# Patient Record
Sex: OTHER/UNKNOWN | Race: WHITE | NOT HISPANIC OR LATINO | Employment: FULL TIME | URBAN - METROPOLITAN AREA
[De-identification: names, ages, dates, MRNs, and addresses within clinical notes are randomized per-mention and may not be internally consistent; named-entity substitution may affect disease eponyms.]

---

## 2022-05-13 RX ORDER — DEXTROAMPHETAMINE SACCHARATE, AMPHETAMINE ASPARTATE, DEXTROAMPHETAMINE SULFATE AND AMPHETAMINE SULFATE 1.25; 1.25; 1.25; 1.25 MG/1; MG/1; MG/1; MG/1
1 TABLET ORAL DAILY
COMMUNITY
Start: 2022-05-05 | End: 2022-07-12

## 2022-05-13 RX ORDER — QUETIAPINE FUMARATE 50 MG/1
50 TABLET, FILM COATED ORAL EVERY EVENING
COMMUNITY
Start: 2022-05-03 | End: 2022-07-12

## 2022-11-15 DIAGNOSIS — F41.1 GENERALIZED ANXIETY DISORDER: ICD-10-CM

## 2022-11-15 DIAGNOSIS — F31.81 BIPOLAR II DISORDER (HCC): ICD-10-CM

## 2022-11-15 RX ORDER — VENLAFAXINE HYDROCHLORIDE 37.5 MG/1
CAPSULE, EXTENDED RELEASE ORAL
Qty: 20 CAPSULE | Refills: 0 | Status: SHIPPED | OUTPATIENT
Start: 2022-11-15 | End: 2022-12-19 | Stop reason: SDUPTHER

## 2023-07-22 DIAGNOSIS — I10 ESSENTIAL HYPERTENSION: ICD-10-CM

## 2023-07-24 RX ORDER — LABETALOL 200 MG/1
200 TABLET, FILM COATED ORAL 2 TIMES DAILY
Qty: 180 TABLET | Refills: 0 | Status: SHIPPED | OUTPATIENT
Start: 2023-07-24

## 2023-07-31 ENCOUNTER — APPOINTMENT (OUTPATIENT)
Dept: LAB | Facility: CLINIC | Age: 33
End: 2023-07-31
Payer: MEDICARE

## 2023-07-31 DIAGNOSIS — E66.01 MORBID OBESITY (HCC): ICD-10-CM

## 2023-07-31 DIAGNOSIS — R79.89 ELEVATED LFTS: ICD-10-CM

## 2023-07-31 DIAGNOSIS — E78.2 MIXED HYPERLIPIDEMIA: ICD-10-CM

## 2023-07-31 LAB
ALBUMIN SERPL BCP-MCNC: 4 G/DL (ref 3.5–5)
ALP SERPL-CCNC: 34 U/L (ref 46–116)
ALT SERPL W P-5'-P-CCNC: 45 U/L (ref 12–78)
AST SERPL W P-5'-P-CCNC: 17 U/L (ref 5–45)
BILIRUB DIRECT SERPL-MCNC: 0.07 MG/DL (ref 0–0.2)
BILIRUB SERPL-MCNC: 0.26 MG/DL (ref 0.2–1)
CHOLEST SERPL-MCNC: 215 MG/DL
HDLC SERPL-MCNC: 36 MG/DL
LDLC SERPL CALC-MCNC: 116 MG/DL (ref 0–100)
NONHDLC SERPL-MCNC: 179 MG/DL
PROT SERPL-MCNC: 7.6 G/DL (ref 6.4–8.4)
TRIGL SERPL-MCNC: 317 MG/DL

## 2023-07-31 PROCEDURE — 80061 LIPID PANEL: CPT

## 2023-07-31 PROCEDURE — 80076 HEPATIC FUNCTION PANEL: CPT

## 2023-07-31 PROCEDURE — 36415 COLL VENOUS BLD VENIPUNCTURE: CPT

## 2023-08-03 ENCOUNTER — OFFICE VISIT (OUTPATIENT)
Dept: FAMILY MEDICINE CLINIC | Facility: CLINIC | Age: 33
End: 2023-08-03
Payer: MEDICARE

## 2023-08-03 VITALS
HEART RATE: 92 BPM | SYSTOLIC BLOOD PRESSURE: 120 MMHG | BODY MASS INDEX: 39.16 KG/M2 | OXYGEN SATURATION: 99 % | DIASTOLIC BLOOD PRESSURE: 80 MMHG | HEIGHT: 63 IN | WEIGHT: 221 LBS

## 2023-08-03 DIAGNOSIS — E78.1 HIGH TRIGLYCERIDES: ICD-10-CM

## 2023-08-03 DIAGNOSIS — E78.2 MIXED HYPERLIPIDEMIA: Primary | ICD-10-CM

## 2023-08-03 DIAGNOSIS — E78.1 HYPERTRIGLYCERIDEMIA: ICD-10-CM

## 2023-08-03 DIAGNOSIS — Z79.899 MEDICATION MANAGEMENT: ICD-10-CM

## 2023-08-03 DIAGNOSIS — R73.01 ELEVATED FASTING BLOOD SUGAR: ICD-10-CM

## 2023-08-03 PROCEDURE — 99214 OFFICE O/P EST MOD 30 MIN: CPT | Performed by: FAMILY MEDICINE

## 2023-08-03 RX ORDER — FENOFIBRATE 160 MG/1
160 TABLET ORAL DAILY
Qty: 90 TABLET | Refills: 1 | Status: SHIPPED | OUTPATIENT
Start: 2023-08-03

## 2023-08-03 NOTE — PROGRESS NOTES
Outpatient Note- Follow up     HPI:     Donald Reyna , 27 y.o. [facility]  presents today for follow-up of chronic conditions. The patient recently obtained labs that I personally reviewed with her today. Her liver function tests have improved after reducing her cholesterol and triglycerides. This is reassuring for her she likely had a history of fatty liver disease. Her cholesterol values have also reduced over time. Her LDL and total cholesterol have come down mildly, her triglycerides after starting Tricor and increasing to 108 mg significantly reduced to the 300s from 500s. She has been tolerating the medication well and takes it at night to prevent issues with flushing. Mental health wise the patient continues to follow with her psychiatric providers. She is currently on Effexor, Seroquel, and Adderall. She is also on Junel, this is to avoid any complications of pregnancy with the medication she is currently on. We reviewed the important symptoms to watch for including lower extremity swelling, redness, pain, or respiratory symptoms of acute chest pain and pleuritic pain. Lastly we reviewed the patient's blood pressure. Stable on presentation today at 120/80. She is remained on labetalol after we discussed possible pregnancy previously. We have not changed it since she will likely need to be on this medication after birth control is discontinued. Past Medical History:   Diagnosis Date   • Anxiety    • Asthma    • Cervical cyst    • Depression    • Hypertension    • Ovarian cyst    • Ovarian cyst     left      ROS:   Review of Systems   See HPI    OBJECTIVE  Vitals:    08/03/23 1326   BP: 120/80   Pulse: 92   SpO2: 99%        Physical Exam  Constitutional:       General: Donald Reyna is not in acute distress. Appearance: Normal appearance. Donald Reyna is obese. Donald Reyna is not ill-appearing, toxic-appearing or diaphoretic. HENT:      Head: Normocephalic and atraumatic. Right Ear: Tympanic membrane, ear canal and external ear normal. There is no impacted cerumen. Left Ear: Tympanic membrane, ear canal and external ear normal. There is no impacted cerumen. Nose: Nose normal. No congestion or rhinorrhea. Mouth/Throat:      Mouth: Mucous membranes are moist.      Pharynx: Oropharynx is clear. No oropharyngeal exudate or posterior oropharyngeal erythema. Eyes:      Pupils: Pupils are equal, round, and reactive to light. Cardiovascular:      Rate and Rhythm: Normal rate and regular rhythm. Heart sounds: Normal heart sounds. No murmur heard. No friction rub. No gallop. Pulmonary:      Effort: Pulmonary effort is normal. No respiratory distress. Breath sounds: Normal breath sounds. No stridor. No wheezing, rhonchi or rales. Abdominal:      General: Bowel sounds are normal. There is no distension. Palpations: Abdomen is soft. Tenderness: There is no abdominal tenderness. Skin:     General: Skin is warm. Capillary Refill: Capillary refill takes less than 2 seconds. Neurological:      Mental Status: Donald Reyna is alert. ASSESSMENT AND PLAN   Rica Daniels was seen today for follow-up. Diagnoses and all orders for this visit:    Mixed hyperlipidemia  Hypertriglyceridemia  Medication management  Patient following up for continued elevations and LDL, total cholesterol, and triglycerides. She has done a great job with losing about 8 pounds over the last several months. She has been changing her diet to include fish and leaner meats such as chicken and turkey. She is looking to start a pescatarian diet. I did review the Mediterranean diet and its benefits with cardiovascular health. After review of her labs, her triglycerides are improved, but not optimal.  Will increase dose of fenofibrate from 108 to 160 mg.   We will follow-up with lipid panel and hepatic function panel in about 3 months to determine if there is been a significant improvement. -     fenofibrate 160 MG tablet; Take 1 tablet (160 mg total) by mouth daily  -     Lipid panel; Future  -     Hepatic function panel; Future    Elevated fasting blood sugar  Patient has history of elevated fasting blood sugar. Will obtain a hemoglobin A1c for follow-up sugar evaluation. Her last fasting sugar was 108 in April. Since she has been dieting, the patient is interested in following up this value after she has been attempting to lose weight.  -     HEMOGLOBIN A1C W/ EAG ESTIMATION; Future  -     Hepatic function panel;  Future      Vilma Mckeon DO  [facility]  8/3/2023 1:59 PM

## 2023-08-05 ENCOUNTER — PATIENT MESSAGE (OUTPATIENT)
Dept: FAMILY MEDICINE CLINIC | Facility: CLINIC | Age: 33
End: 2023-08-05

## 2023-08-05 DIAGNOSIS — Z32.00 POSSIBLE PREGNANCY: Primary | ICD-10-CM

## 2023-08-07 ENCOUNTER — APPOINTMENT (OUTPATIENT)
Dept: LAB | Facility: CLINIC | Age: 33
End: 2023-08-07
Payer: MEDICARE

## 2023-08-07 DIAGNOSIS — Z32.00 POSSIBLE PREGNANCY: Primary | ICD-10-CM

## 2023-08-07 DIAGNOSIS — Z32.00 POSSIBLE PREGNANCY: ICD-10-CM

## 2023-08-07 LAB — B-HCG SERPL-ACNC: <1 MIU/ML (ref 0–0.6)

## 2023-08-07 PROCEDURE — 36415 COLL VENOUS BLD VENIPUNCTURE: CPT

## 2023-08-07 PROCEDURE — 84702 CHORIONIC GONADOTROPIN TEST: CPT

## 2023-08-07 NOTE — PROGRESS NOTES
Order placed to confirm negative pregnancy. I did inform the patient to review with OBGYN, but I believe this to be negative. Confirmatory testing is due to possible medication restart.      Ho Browne DO  [facility]  8/7/2023 6:15 PM

## 2023-08-09 DIAGNOSIS — F41.1 GENERALIZED ANXIETY DISORDER: ICD-10-CM

## 2023-08-09 DIAGNOSIS — F31.81 BIPOLAR II DISORDER (HCC): ICD-10-CM

## 2023-08-09 DIAGNOSIS — K21.9 GASTRO-ESOPHAGEAL REFLUX DISEASE WITHOUT ESOPHAGITIS: ICD-10-CM

## 2023-08-09 RX ORDER — OMEPRAZOLE 20 MG/1
CAPSULE, DELAYED RELEASE ORAL
Qty: 30 CAPSULE | Refills: 0 | Status: SHIPPED | OUTPATIENT
Start: 2023-08-09

## 2023-08-10 RX ORDER — VENLAFAXINE HYDROCHLORIDE 37.5 MG/1
CAPSULE, EXTENDED RELEASE ORAL
Qty: 90 CAPSULE | Refills: 0 | Status: SHIPPED | OUTPATIENT
Start: 2023-08-10 | End: 2023-08-24

## 2023-08-10 NOTE — TELEPHONE ENCOUNTER
Patient requested refill of :  Requested Prescriptions     Pending Prescriptions Disp Refills   • venlafaxine (EFFEXOR-XR) 37.5 mg 24 hr capsule [Pharmacy Med Name: Venlafaxine HCl ER 37.5 MG Oral Capsule Extended Release 24 Hour] 90 capsule 0     Sig: TAKE 3 CAPSULES BY MOUTH ONCE DAILY         Refill request approved and sent to pharmacy on file per patient request. Patient is a transfer of care to a new resident physician starting 8/24/2023.      Willie Bolton MD

## 2023-08-24 ENCOUNTER — OFFICE VISIT (OUTPATIENT)
Dept: PSYCHIATRY | Facility: CLINIC | Age: 33
End: 2023-08-24
Payer: COMMERCIAL

## 2023-08-24 DIAGNOSIS — F41.9 ANXIETY: ICD-10-CM

## 2023-08-24 DIAGNOSIS — F90.2 ATTENTION DEFICIT HYPERACTIVITY DISORDER (ADHD), COMBINED TYPE: ICD-10-CM

## 2023-08-24 DIAGNOSIS — F41.1 GENERALIZED ANXIETY DISORDER: ICD-10-CM

## 2023-08-24 DIAGNOSIS — F31.81 BIPOLAR II DISORDER (HCC): ICD-10-CM

## 2023-08-24 DIAGNOSIS — F32.1 CURRENT MODERATE EPISODE OF MAJOR DEPRESSIVE DISORDER WITHOUT PRIOR EPISODE (HCC): Primary | ICD-10-CM

## 2023-08-24 DIAGNOSIS — G47.00 INSOMNIA, UNSPECIFIED TYPE: ICD-10-CM

## 2023-08-24 PROCEDURE — 90792 PSYCH DIAG EVAL W/MED SRVCS: CPT | Performed by: STUDENT IN AN ORGANIZED HEALTH CARE EDUCATION/TRAINING PROGRAM

## 2023-08-24 RX ORDER — VENLAFAXINE HYDROCHLORIDE 150 MG/1
150 CAPSULE, EXTENDED RELEASE ORAL DAILY
Qty: 30 CAPSULE | Refills: 1 | Status: SHIPPED | OUTPATIENT
Start: 2023-08-24 | End: 2023-09-23

## 2023-08-24 RX ORDER — QUETIAPINE FUMARATE 50 MG/1
50 TABLET, FILM COATED ORAL
Qty: 30 TABLET | Refills: 2 | Status: SHIPPED | OUTPATIENT
Start: 2023-08-24 | End: 2023-09-23

## 2023-08-24 NOTE — PATIENT INSTRUCTIONS
The MIND diet is a variation and combination of a healthy Mediterranean and DASH diet with scientific evidence to support neurological and mental health. Thus it is an excellent healthy eating pattern for many people with neurological disorders such as peripheral neuropathy, nerve injuries such as Bell's Palsy,  cerebrovascular accidents (strokes), cognitive impairment, early dementia. It geovani also be helpful in mental health issues such as depression, anxiety, neurodevelopmental disorders such as ADHD, Autism spectrum. Your doctor and/or nutritionist can individualize the  MIND diet to meet your specific clinical situation. Note that if your are diabetic and you are on blood sugar lowering medications such as insulin or sulfonylureas (like glipizide, glimepiride, etc), this diet is so effective at improving and even reversing diabetes that your need for medication may go way down. To avoid very low blood sugar (hypoglycemia),  be sure to monitor your blood sugar very closely and adjust down your medications if needed under the guidance of your physician. https://imagesvc.Wiser (formerly WisePricer).io/v3/mm/image?url=https%3A%2F%2Fstatic.ones. io%2Fwp-content%2Fuploads%2Fsites%2F12%1R2122%2F02%2Flentil-arugula-avocado-salad-2000.jpg    Here are the key aspects of the MIND diet:    Eat whole, unprocessed food, mainly plants. 'Eat the rainbow' - eat different color veggies to get the full range of their nutrients. Vegetables should be about 1/2 of the plate, 1/4 of plate a (mainly plant based) protein source such as tofu), 1/4 of plate healthy starch or carb such as a whole grain or root vegetable. Eat less animal products, and choose healthy sources, such as healthy fish (e.g. sardines, mackerel, herring, flounder, high quality salmon). No more than the size of a pack of cards of animal products per day. Avoid all industrially produced (feedlot) red meat and poultry.      Eat plenty of these key foods that support brain, nerve and mental health:  Avocados  Beans  Blueberries and other berries  Broccoli, Kale and other leafy greens are key: have a minimum of 1 serving a day in addition to other veggies  Extra-virgin olive oil  Flax seed (grind just prior to eating and toss on breakfast cereal, in salads). Don't heat it because the omega-3 fatty acids are very delicate. Herbal teas: hibiscus, lemon balm, mint, etc.  Fresh herbs & spices like cilantro, dill, rosemary, thyme, oregano, basil, mint, parsley; cinnamon, oregano, marjoram, allspice, saffron and others. Turmeric is especially anti-inflammatory - adding a small amount of black pepper will help its absorption. Many herbs and spices are rich in anti-oxidants  Nuts & seeds, e.g. almonds, walnuts, melinda seeds, pumpkin, sunflower seeds. A handful a day will give you essential fatty acids and minerals like magnesium. Whole grains: oats, buckwheat, millet, teff, sorghum, amaranth. Wenda Cardinal is the only grain that is a complete protein source and therefore is a great staple grain. Whole wheat in moderation is ok. AVOID all white flour products, as they are often largely empty calories deficient in micronutrients and are high in glycemic index that induce inflammation and over-eating. Sweet potatoes (yams) are preferred over white potatoes. AVOID or at least minimize all these:  Processed foods: chips, cookies, frozen dinners, white bread, bagels, pastries, sweets and similar bakery products   Fast food, fried foods. Avoid all fast food restaurants like [employer], [employer], [employer], [employer], [facility] take-out and similar cheap unhealthy food establishments  Processed and industrially produced meats: sutton, salami, pastrami, hot dogs, luncheon meats are filled with saturated fat and sodium that induce nerve and brain inflammation and damage. Occasional free range poultry or grass fed bison or wild game meat like venison (e.g. once or twice a week, 6 oz.  - the size of a pack of cards) is probably ok. Do not eat regular chicken, which is the main source of cholesterol in the standard American diet and a major contributor to obesity and nerve/brain inflammation. Butter or margarine. These are high in saturated or dangerous trans fats  Cheese is high in saturated fat. Use not at all or minimally, such as a little sprinkle of a highly flavored cheese on food. Don't eat any food in which cheese is a main ingredient. Sugary drinks such as soda. Also avoid artificially sweetened sodas, which confuse our energy balancing mechanism and disturb our gut microbiome  Excess alcohol. Don't drink at all if there is a contraindication to drinking such as alcohol use disorder, liver disease, etc., Men should never exceed two drinks per day, women no more than 1 drink per day. A drink = one small glass of wine, one 12 oz. Beer or one shot of hard liquor. Here are some resources to learn more about the MIND diet and improving brain and nerve health: The Mind diet cookbook 2021 by Yasmin Estes. Brief intro then a year's work of nice recipes   The Alzheimer's Solution by Nichole Russo and Pati Carreon MD. Copyright 2017. Reviews healthy lifestyle as relates to brain health. Can use to help prevent/support healing of many neurological and mental health disorders, not just dementia. Has nice recipes in back of book. How not to Die by Aleksey Rosenthal MD Copyright 3945. Evidence based nutrition to help prevent and support healing of many diseases. Has a  Cookbook. Useful web site: NutritionFacts. org      Smoothie for brain & nerve health (could have one a day):  Place in :   About 1 cup or more of water, milk, almond or soy milk, adjust for proper consistency  1 cup fresh kale or spinach or other dark leafy green  1 cup berries such as blueberries, blackberries or raspberries  1 very ripe banana or other fruit like jose alberto, or other fruit of your choice  1 - 2 tablespoons of freshly ground flaxseed  1/4 cup nuts such as walnuts, almonds, hazelnuts, etc.     Can also add per your preference:  Freshly ground Manny or other seeds  Various spices: a dash of nutmeg, 1/2 tsp or so of turmeric, cinnamon. cardamom, coriander, etc.   Flavors such as vanilla, cocoa powder (with a little sugar, like 1 teaspoon for palatability if needed)    If you have diabetes, add 1/2 tsp of cinnamon and sip slowly over 1 hour to avoid a sugar rush. Have fun experimenting and adjusting to your taste! The MIND diet should be part of a comprehensive program to help support healing from your medical condition. Be sure this is under the guidance of your health care provider. Please call the office nursing staff for medication issues including refills, problems obtaining medications, side effects, etc at [phone]. Please return for a follow up appointment as discussed. If you are running late or are unable to attend your appointment, please call our [place] office at [phone]. If you are in psychological crisis including not limited to suicidal/homicidal thoughts or thoughts of self-injury, do not hesitate to call/contact [phone] which is the national mental health emergency line (like 911 but for mental health), your [facility] hotline (see below), or go to the nearest emergency department.   [facility]: [address] Crisis: [phone]  [address] Crisis: [address] Crisis: [address] Crisis: [address] Crisis: [address] [facility] Crisis: [phone]  National Suicide Prevention Hotline: 5-254.444.9137

## 2023-08-24 NOTE — BH TREATMENT PLAN
TREATMENT PLAN (Medication Management Only)        [address] [facility]    Name and Date of Birth:  Caden Wagner 27 y.o. 1990  Date of Treatment Plan: August 24, 2023  Diagnosis/Diagnoses:    1. Current moderate episode of major depressive disorder without prior episode ([address])    2. Anxiety    3. Bipolar II disorder ([address])    4. Generalized anxiety disorder    5. Attention deficit hyperactivity disorder (ADHD), combined type    6. Insomnia, unspecified type      Strengths/Personal Resources for Self-Care: supportive family, taking medications as prescribed, general fund of knowledge, stable employment. Area/Areas of need (in own words): anxiety symptoms, mood swings, attention and concentration problems  1. Long Term Goal: Feel calmer. Target Date:6 months - 2/24/2024  Person/Persons responsible for completion of goal: Korina Llamas  2. Short Term Objective (s) - How will we reach this goal?:   A. Provider new recommended medication/dosage changes and/or continue medication(s): continue current medications as prescribed. Marichuy snow healthy diet . Cholo Riggins all scheduled appointments. Target Date:6 months - 2/24/2024  Person/Persons Responsible for Completion of Goal: Korina Llamas  Progress Towards Goals: continuing treatment  Treatment Modality: medication management with psychotherapy every 1 months  Review due 180 days from date of this plan: 1 month - 9/24/2023  Expected length of service: ongoing treatment  My Physician/PA/NP and I have developed this plan together and I agree to work on the goals and objectives. I understand the treatment goals that were developed for my treatment.
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
## 2023-09-11 ENCOUNTER — TELEPHONE (OUTPATIENT)
Dept: PSYCHIATRY | Facility: CLINIC | Age: 33
End: 2023-09-11

## 2023-09-11 NOTE — TELEPHONE ENCOUNTER
Called patient and LVM to call resident line to r/s appt for 9/26 due to provider having a meeting all day.

## 2023-09-12 DIAGNOSIS — K21.9 GASTRO-ESOPHAGEAL REFLUX DISEASE WITHOUT ESOPHAGITIS: ICD-10-CM

## 2023-09-12 RX ORDER — OMEPRAZOLE 20 MG/1
CAPSULE, DELAYED RELEASE ORAL
Qty: 30 CAPSULE | Refills: 0 | Status: SHIPPED | OUTPATIENT
Start: 2023-09-12

## 2023-10-05 ENCOUNTER — OFFICE VISIT (OUTPATIENT)
Dept: PSYCHIATRY | Facility: CLINIC | Age: 33
End: 2023-10-05

## 2023-10-05 NOTE — PSYCH
No Call. No Show. No Charge    Caden Wagner no showed for Ledy Mak appointment on 10/05/23. Staff will call the patient to reschedule.     Treatment Plan not completed within required time limits due to: Caden Wagner no show appointment on 10/05/23

## 2023-10-07 DIAGNOSIS — I10 ESSENTIAL HYPERTENSION: ICD-10-CM

## 2023-10-07 DIAGNOSIS — K21.9 GASTRO-ESOPHAGEAL REFLUX DISEASE WITHOUT ESOPHAGITIS: ICD-10-CM

## 2023-10-09 RX ORDER — LABETALOL 200 MG/1
200 TABLET, FILM COATED ORAL 2 TIMES DAILY
Qty: 180 TABLET | Refills: 0 | Status: SHIPPED | OUTPATIENT
Start: 2023-10-09

## 2023-10-09 RX ORDER — OMEPRAZOLE 20 MG/1
CAPSULE, DELAYED RELEASE ORAL
Qty: 30 CAPSULE | Refills: 0 | Status: SHIPPED | OUTPATIENT
Start: 2023-10-09

## 2023-10-23 ENCOUNTER — TELEPHONE (OUTPATIENT)
Dept: PSYCHIATRY | Facility: CLINIC | Age: 33
End: 2023-10-23

## 2023-10-23 NOTE — TELEPHONE ENCOUNTER
Patient left  requesting a call back to schedule a follow up appointment with her med management provider. Please reach out at the earliest convenience to assist with scheduling. Thank you!

## 2023-11-01 ENCOUNTER — APPOINTMENT (OUTPATIENT)
Age: 33
End: 2023-11-01
Payer: MEDICARE

## 2023-11-01 ENCOUNTER — OFFICE VISIT (OUTPATIENT)
Dept: PSYCHIATRY | Facility: CLINIC | Age: 33
End: 2023-11-01
Payer: COMMERCIAL

## 2023-11-01 ENCOUNTER — OFFICE VISIT (OUTPATIENT)
Age: 33
End: 2023-11-01
Payer: MEDICARE

## 2023-11-01 VITALS
BODY MASS INDEX: 39.87 KG/M2 | WEIGHT: 225 LBS | DIASTOLIC BLOOD PRESSURE: 113 MMHG | OXYGEN SATURATION: 98 % | HEART RATE: 99 BPM | HEIGHT: 63 IN | SYSTOLIC BLOOD PRESSURE: 186 MMHG | RESPIRATION RATE: 18 BRPM | TEMPERATURE: 97.7 F

## 2023-11-01 DIAGNOSIS — F90.2 ATTENTION DEFICIT HYPERACTIVITY DISORDER (ADHD), COMBINED TYPE: ICD-10-CM

## 2023-11-01 DIAGNOSIS — F39 UNSPECIFIED MOOD (AFFECTIVE) DISORDER (HCC): ICD-10-CM

## 2023-11-01 DIAGNOSIS — F41.1 GENERALIZED ANXIETY DISORDER: ICD-10-CM

## 2023-11-01 DIAGNOSIS — S93.601A RIGHT FOOT SPRAIN, INITIAL ENCOUNTER: Primary | ICD-10-CM

## 2023-11-01 DIAGNOSIS — F32.1 CURRENT MODERATE EPISODE OF MAJOR DEPRESSIVE DISORDER WITHOUT PRIOR EPISODE (HCC): Primary | ICD-10-CM

## 2023-11-01 DIAGNOSIS — S93.601A RIGHT FOOT SPRAIN, INITIAL ENCOUNTER: ICD-10-CM

## 2023-11-01 DIAGNOSIS — G47.00 INSOMNIA, UNSPECIFIED TYPE: ICD-10-CM

## 2023-11-01 PROCEDURE — 99214 OFFICE O/P EST MOD 30 MIN: CPT

## 2023-11-01 PROCEDURE — 99213 OFFICE O/P EST LOW 20 MIN: CPT

## 2023-11-01 PROCEDURE — 73630 X-RAY EXAM OF FOOT: CPT

## 2023-11-01 RX ORDER — QUETIAPINE FUMARATE 50 MG/1
50 TABLET, FILM COATED ORAL
Qty: 30 TABLET | Refills: 0 | Status: SHIPPED | OUTPATIENT
Start: 2023-11-01 | End: 2023-12-01

## 2023-11-01 RX ORDER — VENLAFAXINE HYDROCHLORIDE 150 MG/1
150 CAPSULE, EXTENDED RELEASE ORAL DAILY
Qty: 30 CAPSULE | Refills: 0 | Status: SHIPPED | OUTPATIENT
Start: 2023-11-01 | End: 2023-12-01

## 2023-11-01 NOTE — PATIENT INSTRUCTIONS
Please call the office nursing staff for medication issues including refills, problems obtaining medications, side effects, etc at [phone]. Please return for a follow up appointment as discussed. If you are running late or are unable to attend your appointment, please call our [place] office at [phone]. If you are in psychological crisis including not limited to suicidal/homicidal thoughts or thoughts of self-injury, do not hesitate to call/contact 988 which is the national mental health emergency line (like 911 but for mental health), your [facility] hotline (see below), or go to the nearest emergency department.   [facility] Crisis: [address] Crisis: [phone]  [address] Crisis: [address] Crisis: [address] Crisis: [address] Crisis: [address] Crisis: [phone]  National Suicide Prevention Hotline: 4-451.480.8568

## 2023-11-01 NOTE — PATIENT INSTRUCTIONS
Preliminary Xray-reading of right foot- No acute fracture. Radiologist will have final reading- if that is different I will call you. 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 Use post op shoe to ambulate for support and comfort of your foot. Ice to affected area first 48 hours, heat afterwards. 15 minutes every 3 hours as needed throughout the day  Topical pain medication such as icy/hot, Biofreeze, Salon pas, etc.  Ibuprofen - 400-800 mg orally every 6-8 hours when required, maximum 2400 mg/day OR Naproxen - 250-500 mg orally twice daily when required, maximum 1250 mg/day      Follow up with orthopedics if pain not improving on its own in 5 days.   Call [phone] to make an appointment
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
## 2023-11-01 NOTE — PROGRESS NOTES
[facility]        NAME: Mag Zamarripa is a 26 y.o. adult  : 1990    MRN: [id number]  DATE: 2023  TIME: 4:27 PM    Assessment and Plan   Right foot sprain, initial encounter [S93.601A]  1. Right foot sprain, initial encounter  XR foot 3+ vw right        Discussed elevated BP - follow up with PCP  Post op shoe. Patient Instructions   Preliminary Xray-reading of right foot- No acute fracture. Radiologist will have final reading- if that is different I will call you. 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 Use post op shoe to ambulate for support and comfort of your foot. Ice to affected area first 48 hours, heat afterwards. 15 minutes every 3 hours as needed throughout the day  Topical pain medication such as icy/hot, Biofreeze, Salon pas, etc.  Ibuprofen - 400-800 mg orally every 6-8 hours when required, maximum 2400 mg/day OR Naproxen - 250-500 mg orally twice daily when required, maximum 1250 mg/day      Follow up with orthopedics if pain not improving on its own in 5 days. Call [phone] to make an appointment    Chief Complaint     Chief Complaint   Patient presents with   • Foot Pain     Started 3 days ago. Patient states that her right foot started as a muscle pain, then she noticed bruise. History of Present Illness       Right dorsal and medial foot pain starting about 3 days ago. No specific injury, no new footwear. 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 Due to the pain she has been massaging the affected area and unsure if that could have contributed to the discoloration of her foot. Review of Systems   Review of Systems   Musculoskeletal:  Positive for arthralgias and myalgias. Right foot   Skin:  Positive for color change. Neurological:  Negative for dizziness, weakness and light-headedness.          Current Medications       Current Outpatient Medications:   • albuterol (PROVENTIL HFA,VENTOLIN HFA) 90 mcg/act inhaler, Inhale 2 puffs every 6 (six) hours as needed for wheezing, Disp: , Rfl:   •  fenofibrate 160 MG tablet, Take 1 tablet (160 mg total) by mouth daily, Disp: 90 tablet, Rfl: 1  •  labetalol (NORMODYNE) 200 mg tablet, Take 1 tablet by mouth twice daily, Disp: 180 tablet, Rfl: 0  •  omeprazole (PriLOSEC) 20 mg delayed release capsule, Take 1 capsule by mouth once daily, Disp: 30 capsule, Rfl: 0  •  QUEtiapine (SEROquel) 50 mg tablet, Take 1 tablet (50 mg total) by mouth daily at bedtime, Disp: 30 tablet, Rfl: 0  •  venlafaxine (EFFEXOR-XR) 150 mg 24 hr capsule, Take 1 capsule (150 mg total) by mouth daily, Disp: 30 capsule, Rfl: 0  •  amphetamine-dextroamphetamine (ADDERALL XR, 5MG,) 5 MG 24 hr capsule, Take 1 capsule (5 mg total) by mouth every morning Max Daily Amount: 5 mg, Disp: 30 capsule, Rfl: 0  •  norethindrone-ethinyl estradiol (Junel FE 1/20) 1-20 MG-MCG per tablet, Take 1 tablet by mouth daily, Disp: 84 tablet, Rfl: 3    Current Allergies     Allergies as of 11/01/2023 - Reviewed 11/01/2023   Allergen Reaction Noted   • Tylenol [acetaminophen] Anaphylaxis 08/19/2018   • Kiwi extract - food allergy Itching 08/24/2023   • Pineapple extract - food allergy Itching 08/24/2023   • Latex Rash 08/19/2018            The following portions of the patient's history were reviewed and updated as appropriate: allergies, current medications, past family history, past medical history, past social history, past surgical history and problem list.     Past Medical History:   Diagnosis Date   • Anxiety    • Asthma    • Cervical cyst    • Depression    • Hypertension    • Ovarian cyst    • Ovarian cyst     left       Past Surgical History:   Procedure Laterality Date   • DE ESOPHAGOGASTRODUODENOSCOPY TRANSORAL DIAGNOSTIC N/A 4/16/2019    Procedure: ESOPHAGOGASTRODUODENOSCOPY (EGD); Surgeon: Kim Cali MD;  Location: [facility];   Service: Gastroenterology   • TOOTH EXTRACTION     • WISDOM TOOTH EXTRACTION         Family History   Problem Relation Age of Onset   • Hypertension Mother    • Arthritis Father    • Bipolar disorder Father    • Heart disease Father         cardiac disorder   • Hypertension Father    • Hypertension Sister    • No Known Problems Sister    • Arthritis Family    • Hypertension Family    • Hypertension Family    • Colon cancer Neg Hx    • Ovarian cancer Neg Hx    • Breast cancer Neg Hx    • Uterine cancer Neg Hx          Medications have been verified. Objective   BP (!) 186/113   Pulse 99   Temp 97.7 °F (36.5 °C)   Resp 18   Ht 5' 3" (1.6 m)   Wt 102 kg (225 lb)   SpO2 98%   BMI 39.86 kg/m²   No LMP recorded. Physical Exam     Physical Exam  Vitals and nursing note reviewed. Constitutional:       Appearance: Normal appearance. Yuko Rosa is normal weight. HENT:      Head: Normocephalic and atraumatic. Pulmonary:      Effort: Pulmonary effort is normal.   Musculoskeletal:        Feet:    Skin:     General: Skin is warm and dry. Capillary Refill: Capillary refill takes less than 2 seconds. Neurological:      General: No focal deficit present. Mental Status: Yuko Rosa is alert and oriented to person, place, and time. Mental status is at baseline. Sensory: No sensory deficit. Motor: No weakness. Psychiatric:         Mood and Affect: Mood normal.         Behavior: Behavior normal.         Thought Content:  Thought content normal.

## 2023-11-02 ENCOUNTER — OFFICE VISIT (OUTPATIENT)
Dept: OBGYN CLINIC | Facility: CLINIC | Age: 33
End: 2023-11-02

## 2023-11-02 VITALS
RESPIRATION RATE: 18 BRPM | DIASTOLIC BLOOD PRESSURE: 91 MMHG | HEART RATE: 98 BPM | HEIGHT: 63 IN | BODY MASS INDEX: 39.87 KG/M2 | WEIGHT: 225 LBS | SYSTOLIC BLOOD PRESSURE: 140 MMHG

## 2023-11-02 DIAGNOSIS — Z59.9 FINANCIAL DIFFICULTIES: Primary | ICD-10-CM

## 2023-11-02 DIAGNOSIS — Z30.09 ENCOUNTER FOR OTHER GENERAL COUNSELING OR ADVICE ON CONTRACEPTION: ICD-10-CM

## 2023-11-02 DIAGNOSIS — I10 ESSENTIAL HYPERTENSION: ICD-10-CM

## 2023-11-02 DIAGNOSIS — N96 RECURRENT PREGNANCY LOSS: ICD-10-CM

## 2023-11-02 DIAGNOSIS — N87.0 CIN I (CERVICAL INTRAEPITHELIAL NEOPLASIA I): ICD-10-CM

## 2023-11-02 PROBLEM — N83.209 OVARIAN CYST: Status: ACTIVE | Noted: 2023-11-02

## 2023-11-02 PROBLEM — E78.2 MIXED HYPERLIPIDEMIA: Status: ACTIVE | Noted: 2023-11-02

## 2023-11-02 PROBLEM — E78.1 HYPERTRIGLYCERIDEMIA: Status: ACTIVE | Noted: 2023-11-02

## 2023-11-02 PROBLEM — F32.A DEPRESSION: Status: ACTIVE | Noted: 2023-11-02

## 2023-11-02 PROBLEM — J45.909 ASTHMA: Status: ACTIVE | Noted: 2023-11-02

## 2023-11-02 RX ORDER — ACETAMINOPHEN AND CODEINE PHOSPHATE 120; 12 MG/5ML; MG/5ML
1 SOLUTION ORAL DAILY
Qty: 28 TABLET | Refills: 3 | Status: SHIPPED | OUTPATIENT
Start: 2023-11-02 | End: 2024-01-31

## 2023-11-02 SDOH — ECONOMIC STABILITY - INCOME SECURITY: PROBLEM RELATED TO HOUSING AND ECONOMIC CIRCUMSTANCES, UNSPECIFIED: Z59.9

## 2023-11-02 NOTE — PROGRESS NOTES
OB/GYN VISIT  Donald Dowd  11/2/2023  8:31 AM      Assessment/Plan:    Donald Dowd is a 26 y.o. [id number] nonbinary person with recurrent pregnancy loss    Problem List Items Addressed This Visit       Recurrent pregnancy loss     Discussed etiologies of RPL can be age, genetics, endocrine, immunologic, structural, thrombophilic, environmental/lifestyle, however, majority of RPL is unexplained. Patient desires pregnancy in the next year, but would like to start the workup for recurrent pregnancy loss as well as prevent current pregnancy while working with PCP to improve hyperlipidemia/triglyceridemia and hypertension. Patient is aware of limitations of this office in regards to reproductive technologies, and ultimately, if unexplained RPL, likely would need MINI consultation. Will place referral at this time so is available if needed. Will proceed with following workup  - Pelvic ultrasound - polyp noted on 2022 ultrasound  - Repeat TSH, A1C - last TSH >2.5  - Repeat LURDES, Lupus AC, B2 glycoprotein  - AMH  - Pending results, will also consider semen analysis for partner    As she does not have any personal or family hx of VTE or clotting disorders, thrombophilia workup deferred at this time. Relevant Medications    norethindrone (Ortho Micronor) 0.35 MG tablet    Other Relevant Orders    TSH, 3rd generation with Free T4 reflex    US pelvis complete non OB    Cardiolipin antibody    Lupus anticoagulant    B2 Glycoprotein I (IgG)Ab    Antimullerian hormone [facility])    Ambulatory Referral to Infertility    Essential hypertension    Contraception management     Patient currently taking Junel, combined OCP. Recommend discontinuation and transition to progesterone-based contraception in setting of HTN/HLD/obesity due to increased VTE risk. Will discontinue Junel and start Micronor. Counseled on perfect vs typical Micronor use.  As she would prefer to not take a daily pill and have more reliable contraception, interested in [address] IUD. Papers completed and pamphlet provided. If patient is happy with Micronor can continue, otherwise will plan to schedule IUD insertion once available in office          ARIAS I (cervical intraepithelial neoplasia I)     ARIAS 1, HPV 18 on colposcopy April 2022  Due for repeat pap, will plan to complete at next visit           Other Visit Diagnoses       Financial difficulties    -  Primary    Relevant Orders    Ambulatory referral to social work care management program          Hieu Palacios will return in one month for results review, pap and IUD insertion    Subjective:     Paty Albert is a 26 y.o. [id number] nonbinary person who presents for evaluation of recurrent pregnancy loss. Patient reports history of 12 miscarriages since 2010 and 6 within this calendar year alone. She states she has irregular periods. When she skips a period she will take a pregnancy test which typically shows a "faint line". Missed period is typically recognized around six weeks, then, she will get her period and have a repeat negative urine pregnancy test. Patient reports 10 weeks was the latest weeks gestation she has reached. Pregnancy tests have been home tests. She has never had a documented IUP. Patient is currently sexually active with male partner, but using OCPs for contraception in the setting of health optimization as she is taking fenofibrate for mixed HLD and hypertriglyceridemia. She has been using Junel for the last 6 months. Will still have irregular bleeding with the Junel. Patient desires future pregnancy, likely in the next year. She is currently following with PCP. Here at this visit with partner. He does not have any living children. Denies tobacco, alcohol use. No known personal or family history of clotting disorders. Has had lupus AC, LURDES and b2 glycoprotein testing in April which was negative. Caffeine intake is 3 cups of tea per day.      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       Current Outpatient Medications:     albuterol (PROVENTIL HFA,VENTOLIN HFA) 90 mcg/act inhaler, Inhale 2 puffs every 6 (six) hours as needed for wheezing, Disp: , Rfl:     amphetamine-dextroamphetamine (ADDERALL XR, 5MG,) 5 MG 24 hr capsule, Take 1 capsule (5 mg total) by mouth every morning Max Daily Amount: 5 mg, Disp: 30 capsule, Rfl: 0    fenofibrate 160 MG tablet, Take 1 tablet (160 mg total) by mouth daily, Disp: 90 tablet, Rfl: 1    labetalol (NORMODYNE) 200 mg tablet, Take 1 tablet by mouth twice daily, Disp: 180 tablet, Rfl: 0    norethindrone-ethinyl estradiol (Junel FE 1/20) 1-20 MG-MCG per tablet, Take 1 tablet by mouth daily, Disp: 84 tablet, Rfl: 3    omeprazole (PriLOSEC) 20 mg delayed release capsule, Take 1 capsule by mouth once daily, Disp: 30 capsule, Rfl: 0    QUEtiapine (SEROquel) 50 mg tablet, Take 1 tablet (50 mg total) by mouth daily at bedtime, Disp: 30 tablet, Rfl: 0    venlafaxine (EFFEXOR-XR) 150 mg 24 hr capsule, Take 1 capsule (150 mg total) by mouth daily, Disp: 30 capsule, Rfl: 0    Objective:    Vitals: Blood pressure 140/91, pulse 98, resp. rate 18, height 5' 3" (1.6 m), weight 102 kg (225 lb), last menstrual period 10/26/2023. Body mass index is 39.86 kg/m². Past Medical History:   Diagnosis Date    Anxiety     Asthma     Cervical cyst     Depression     Fatty liver     Hypertension     Hypertriglyceridemia     Mixed hyperlipidemia     Ovarian cyst     Ovarian cyst     left     Past Surgical History:   Procedure Laterality Date    [place] ESOPHAGOGASTRODUODENOSCOPY TRANSORAL DIAGNOSTIC N/A 4/16/2019    Procedure: ESOPHAGOGASTRODUODENOSCOPY (EGD); Surgeon: Donn Plata MD;  Location: [facility]; Service: Gastroenterology    TOOTH EXTRACTION      WISDOM TOOTH EXTRACTION         Physical Exam  Vitals reviewed. Constitutional:       General: Suha Mcleod is not in acute distress. Appearance: Normal appearance. Suha Mcleod is well-developed. Suha Mcleod is obese.  Suha Mcleod is not ill-appearing, toxic-appearing or diaphoretic. HENT:      Head: Normocephalic and atraumatic. Eyes:      General: No scleral icterus. Conjunctiva/sclera: Conjunctivae normal.   Cardiovascular:      Rate and Rhythm: Normal rate. Pulmonary:      Effort: Pulmonary effort is normal. No respiratory distress. Skin:     General: Skin is warm and dry. Neurological:      General: No focal deficit present. Mental Status: Sapna Tolentino is alert and oriented to person, place, and time.    Psychiatric:         Mood and Affect: Mood normal.         Behavior: Behavior normal.     Lab Results   Component Value Date    YEW2MNSSWBPH 2.710 04/21/2023     Lab Results   Component Value Date    HGBA1C 5.4 04/21/2023         Ronan Moreland MD  11/2/2023  8:31 AM

## 2023-11-03 PROBLEM — N83.209 OVARIAN CYST: Status: RESOLVED | Noted: 2023-11-02 | Resolved: 2023-11-03

## 2023-11-03 PROBLEM — Z30.9 CONTRACEPTION MANAGEMENT: Status: ACTIVE | Noted: 2023-11-03

## 2023-11-03 PROBLEM — K21.9 GASTROESOPHAGEAL REFLUX DISEASE: Status: RESOLVED | Noted: 2018-11-05 | Resolved: 2023-11-03

## 2023-11-03 PROBLEM — N87.0 CIN I (CERVICAL INTRAEPITHELIAL NEOPLASIA I): Status: ACTIVE | Noted: 2023-11-03

## 2023-11-03 PROBLEM — I10 HYPERTENSION: Status: RESOLVED | Noted: 2023-11-02 | Resolved: 2023-11-03

## 2023-11-03 PROBLEM — N96 RECURRENT PREGNANCY LOSS: Status: ACTIVE | Noted: 2023-11-03

## 2023-11-03 NOTE — ASSESSMENT & PLAN NOTE
Patient currently taking Junel, combined OCP. Recommend discontinuation and transition to progesterone-based contraception in setting of HTN/HLD/obesity due to increased VTE risk. Will discontinue Junel and start Micronor. Counseled on perfect vs typical Micronor use. As she would prefer to not take a daily pill and have more reliable contraception, interested in [address] IUD. Papers completed and pamphlet provided.  If patient is happy with Micronor can continue, otherwise will plan to schedule IUD insertion once available in office

## 2023-11-03 NOTE — ASSESSMENT & PLAN NOTE
Discussed etiologies of RPL can be age, genetics, endocrine, immunologic, structural, thrombophilic, environmental/lifestyle, however, majority of RPL is unexplained. Patient desires pregnancy in the next year, but would like to start the workup for recurrent pregnancy loss as well as prevent current pregnancy while working with PCP to improve hyperlipidemia/triglyceridemia and hypertension. Patient is aware of limitations of this office in regards to reproductive technologies, and ultimately, if unexplained RPL, likely would need MINI consultation. Will place referral at this time so is available if needed. Will proceed with following workup  - Pelvic ultrasound - polyp noted on 2022 ultrasound  - Repeat TSH, A1C - last TSH >2.5  - Repeat LURDES, Lupus AC, B2 glycoprotein  - AMH  - Pending results, will also consider semen analysis for partner    As she does not have any personal or family hx of VTE or clotting disorders, thrombophilia workup deferred at this time.

## 2023-11-06 ENCOUNTER — PATIENT OUTREACH (OUTPATIENT)
Dept: OBGYN CLINIC | Facility: CLINIC | Age: 33
End: 2023-11-06

## 2023-11-06 NOTE — PROGRESS NOTES
ETHAN TORIBIO attempted to contact pt to address needs. Pt did not answer. ETHAN TORIBIO left a voicemail for a return call.

## 2023-11-13 DIAGNOSIS — K21.9 GASTRO-ESOPHAGEAL REFLUX DISEASE WITHOUT ESOPHAGITIS: ICD-10-CM

## 2023-11-14 RX ORDER — OMEPRAZOLE 20 MG/1
CAPSULE, DELAYED RELEASE ORAL
Qty: 30 CAPSULE | Refills: 0 | Status: SHIPPED | OUTPATIENT
Start: 2023-11-14

## 2023-11-20 ENCOUNTER — TELEPHONE (OUTPATIENT)
Age: 33
End: 2023-11-20

## 2023-11-20 NOTE — TELEPHONE ENCOUNTER
Left message for pt to call office to schedule appt for ss consult with dr Kaykay Vazquez from referral list for insomnia

## 2023-11-27 ENCOUNTER — PATIENT OUTREACH (OUTPATIENT)
Dept: OBGYN CLINIC | Facility: CLINIC | Age: 33
End: 2023-11-27

## 2023-11-27 NOTE — PROGRESS NOTES
ETHAN TORIBIO attempted to call pt to address their needs. Pt did not answer the call. ETHAN TORIBIO left a voicemail for a return call.

## 2023-11-30 ENCOUNTER — APPOINTMENT (OUTPATIENT)
Age: 33
End: 2023-11-30
Payer: MEDICARE

## 2023-11-30 DIAGNOSIS — R73.01 ELEVATED FASTING BLOOD SUGAR: ICD-10-CM

## 2023-11-30 DIAGNOSIS — Z79.899 MEDICATION MANAGEMENT: ICD-10-CM

## 2023-11-30 DIAGNOSIS — Z32.00 POSSIBLE PREGNANCY: ICD-10-CM

## 2023-11-30 DIAGNOSIS — E78.2 MIXED HYPERLIPIDEMIA: ICD-10-CM

## 2023-11-30 DIAGNOSIS — E78.1 HIGH TRIGLYCERIDES: ICD-10-CM

## 2023-11-30 DIAGNOSIS — N96 RECURRENT PREGNANCY LOSS: ICD-10-CM

## 2023-11-30 DIAGNOSIS — E78.1 HYPERTRIGLYCERIDEMIA: ICD-10-CM

## 2023-11-30 LAB
ALBUMIN SERPL BCP-MCNC: 4.5 G/DL (ref 3.5–5)
ALP SERPL-CCNC: 32 U/L (ref 34–104)
ALT SERPL W P-5'-P-CCNC: 29 U/L (ref 7–52)
AST SERPL W P-5'-P-CCNC: 23 U/L (ref 5–45)
B-HCG SERPL-ACNC: <1 MIU/ML (ref 0–0.6)
BILIRUB DIRECT SERPL-MCNC: 0.03 MG/DL (ref 0–0.2)
BILIRUB SERPL-MCNC: 0.26 MG/DL (ref 0.2–1)
CHOLEST SERPL-MCNC: 204 MG/DL
EST. AVERAGE GLUCOSE BLD GHB EST-MCNC: 120 MG/DL
HBA1C MFR BLD: 5.8 %
HDLC SERPL-MCNC: 35 MG/DL
LDLC SERPL CALC-MCNC: 116 MG/DL (ref 0–100)
NONHDLC SERPL-MCNC: 169 MG/DL
PROT SERPL-MCNC: 7.3 G/DL (ref 6.4–8.4)
TRIGL SERPL-MCNC: 265 MG/DL
TSH SERPL DL<=0.05 MIU/L-ACNC: 2.35 UIU/ML (ref 0.45–4.5)

## 2023-11-30 PROCEDURE — 36415 COLL VENOUS BLD VENIPUNCTURE: CPT

## 2023-11-30 PROCEDURE — 84702 CHORIONIC GONADOTROPIN TEST: CPT

## 2023-11-30 PROCEDURE — 86146 BETA-2 GLYCOPROTEIN ANTIBODY: CPT

## 2023-11-30 PROCEDURE — 84443 ASSAY THYROID STIM HORMONE: CPT

## 2023-11-30 PROCEDURE — 85732 THROMBOPLASTIN TIME PARTIAL: CPT

## 2023-11-30 PROCEDURE — 82397 CHEMILUMINESCENT ASSAY: CPT

## 2023-11-30 PROCEDURE — 80061 LIPID PANEL: CPT

## 2023-11-30 PROCEDURE — 83036 HEMOGLOBIN GLYCOSYLATED A1C: CPT

## 2023-11-30 PROCEDURE — 86147 CARDIOLIPIN ANTIBODY EA IG: CPT

## 2023-11-30 PROCEDURE — 85613 RUSSELL VIPER VENOM DILUTED: CPT

## 2023-11-30 PROCEDURE — 85705 THROMBOPLASTIN INHIBITION: CPT

## 2023-11-30 PROCEDURE — 80076 HEPATIC FUNCTION PANEL: CPT

## 2023-11-30 PROCEDURE — 85670 THROMBIN TIME PLASMA: CPT

## 2023-12-01 ENCOUNTER — OFFICE VISIT (OUTPATIENT)
Dept: FAMILY MEDICINE CLINIC | Facility: CLINIC | Age: 33
End: 2023-12-01
Payer: MEDICARE

## 2023-12-01 VITALS
DIASTOLIC BLOOD PRESSURE: 100 MMHG | WEIGHT: 219 LBS | HEART RATE: 91 BPM | OXYGEN SATURATION: 99 % | SYSTOLIC BLOOD PRESSURE: 140 MMHG | RESPIRATION RATE: 16 BRPM | BODY MASS INDEX: 38.8 KG/M2 | HEIGHT: 63 IN

## 2023-12-01 DIAGNOSIS — E78.2 MIXED HYPERLIPIDEMIA: ICD-10-CM

## 2023-12-01 DIAGNOSIS — I10 ESSENTIAL HYPERTENSION: Primary | ICD-10-CM

## 2023-12-01 DIAGNOSIS — F32.1 CURRENT MODERATE EPISODE OF MAJOR DEPRESSIVE DISORDER WITHOUT PRIOR EPISODE (HCC): ICD-10-CM

## 2023-12-01 DIAGNOSIS — E78.1 HYPERTRIGLYCERIDEMIA: ICD-10-CM

## 2023-12-01 DIAGNOSIS — R73.03 PREDIABETES: ICD-10-CM

## 2023-12-01 PROCEDURE — 99214 OFFICE O/P EST MOD 30 MIN: CPT | Performed by: FAMILY MEDICINE

## 2023-12-01 RX ORDER — LOSARTAN POTASSIUM 25 MG/1
25 TABLET ORAL DAILY
Qty: 30 TABLET | Refills: 0 | Status: SHIPPED | OUTPATIENT
Start: 2023-12-01

## 2023-12-01 RX ORDER — ROSUVASTATIN CALCIUM 5 MG/1
5 TABLET, COATED ORAL DAILY
Qty: 30 TABLET | Refills: 0 | Status: SHIPPED | OUTPATIENT
Start: 2023-12-01

## 2023-12-03 NOTE — PROGRESS NOTES
Outpatient Note- Follow up     HPI:     Tod Cantu , 26 y.o. genderqueer, neither exclusively male or female  presents today for follow up of chronic conditions. The patient has seen the OBGYN/ fertility and there has been continued difficulty with becoming pregnant. We had discussed in the past medications that may have harmed baby, and therefore we avoided them. She will be soon put on new birth control and she is interested in obtaining new medication that may be more potent since there is little chance for pregnancy without IVF and she requires better control of her blood pressure and lipids. Currently she is only on fenofibrate and labetalol. The patient and her  are looking into adoption and her focus has switched from pregnancy to helping to control her chronic conditions. On presentation her blood pressure is 140/100. She has been compliant on labetalol 200mg BID. I personally reviewed her recent labs which did demonstrate continued elevated cholesterol in multiple categories including total cholesterol, triglycerides, and LDL. The fenofibrate has been helping to reduce her triglycerides down from 500 in April 2023 through to mid 200s currently. She does not have any evidence of liver function issues that she had previously most likely secondary to fatty liver disease. She also has increased A1c to 5.8. We reviewed that it is important to continue watching her diet, and focusing on reducing high calorie low nutritional foods and high carb/cholesterol foods. She denies any new fever, chills, nausea, vomiting, lightheadedness, dizziness, chest pain, shortness of breath. She has been following with her psychiatric provider to continue her Effexor, Adderall, Seroquel.         Past Medical History:   Diagnosis Date    Anxiety     Asthma     Cervical cyst     Depression     Fatty liver     Hypertension     Hypertriglyceridemia     Mixed hyperlipidemia     Ovarian cyst     Ovarian cyst     left      ROS:   Review of Systems   See HPI    OBJECTIVE  Vitals:    12/01/23 1100   BP: 140/100   Pulse: 91   Resp: 16   SpO2: 99%        Physical Exam  Constitutional:       General: Avila Esparza is not in acute distress. Appearance: Normal appearance. Avila Esparza is obese. Avila Esparza is not ill-appearing, toxic-appearing or diaphoretic. HENT:      Head: Normocephalic and atraumatic. Cardiovascular:      Rate and Rhythm: Normal rate and regular rhythm. Heart sounds: Normal heart sounds. No murmur heard. No friction rub. No gallop. Pulmonary:      Effort: Pulmonary effort is normal. No respiratory distress. Breath sounds: Normal breath sounds. No stridor. No wheezing, rhonchi or rales. Abdominal:      General: Bowel sounds are normal. There is no distension. Palpations: Abdomen is soft. Tenderness: There is no abdominal tenderness. Skin:     General: Skin is warm. Neurological:      Mental Status: Avila Esparza is alert. ASSESSMENT AND PLAN   Diagnoses and all orders for this visit:    Essential hypertension  Due to patient's elevated blood pressure on labetalol 200 mg twice daily, and with her decision to prioritize adoption will switch from labetalol over to losartan. She is to have the dose of her labetalol to 100 mg twice a day and start Cozaar. We will follow-up with her in about 1 week and see if we can continue off of the  labetalol.  -     losartan (COZAAR) 25 mg tablet; Take 1 tablet (25 mg total) by mouth daily    Current moderate episode of major depressive disorder without prior episode [facility])  Patient continues to have underlying psychiatric issues. She is currently being followed by psychiatry and is on multiple medications. She is currently stable. Appreciate recommendations from specialists    Prediabetes  Reviewed most recent A1c. Elevated.   Requested that patient decrease carbohydrate intake and high cholesterol low calorie foods.  Will follow-up with hemoglobin A1c in 3 months  -     HEMOGLOBIN A1C W/ EAG ESTIMATION; Future    Hypertriglyceridemia  Mixed hyperlipidemia  Patient has continued elevated triglycerides, total cholesterol, and LDL. Since we are able to now use a statin, since pregnancy is going to be low risk with the start of birth control and process of adoption, will start Crestor 5 mg daily. Depending on follow-up labs may require increased dosing of Crestor to 10 or 20 mg. She is to continue the fenofibrate right now, may be able to taper off medication once Crestor effectiveness has been established. -     rosuvastatin (CRESTOR) 5 mg tablet; Take 1 tablet (5 mg total) by mouth daily  -     Lipid panel;  Future         Jaqueline Randolph DO  [facility]  12/3/2023 10:37 AM

## 2023-12-04 LAB — MIS SERPL-MCNC: 2.09 NG/ML

## 2023-12-05 ENCOUNTER — TELEPHONE (OUTPATIENT)
Dept: OBGYN CLINIC | Facility: CLINIC | Age: 33
End: 2023-12-05

## 2023-12-05 LAB
APTT SCREEN TO CONFIRM RATIO: 1.1 RATIO (ref 0–1.34)
B2 GLYCOPROT1 IGA SERPL IA-ACNC: 1.2
B2 GLYCOPROT1 IGG SERPL IA-ACNC: 1.1
B2 GLYCOPROT1 IGM SERPL IA-ACNC: <2.4
CARDIOLIPIN IGA SER IA-ACNC: 3.2
CARDIOLIPIN IGG SER IA-ACNC: 1.1
CARDIOLIPIN IGM SER IA-ACNC: 1.5
CONFIRM APTT/NORMAL: 37.1 SEC (ref 0–47.6)
LA PPP-IMP: NORMAL
SCREEN APTT: 40.1 SEC (ref 0–43.5)
SCREEN DRVVT: 43.7 SEC (ref 0–47)
THROMBIN TIME: 17.9 SEC (ref 0–23)

## 2023-12-05 NOTE — TELEPHONE ENCOUNTER
Attempted to call, referred patient to my chart to review recent test results. Office number provided in message in case of any questions or concerns.

## 2023-12-05 NOTE — TELEPHONE ENCOUNTER
----- Message from Chelsi Oshea MD sent at 12/5/2023  9:52 AM EST -----  Please let patient know that her TSH and AMH were within the normal ranges.

## 2023-12-07 ENCOUNTER — TELEPHONE (OUTPATIENT)
Dept: FAMILY MEDICINE CLINIC | Facility: CLINIC | Age: 33
End: 2023-12-07

## 2023-12-07 NOTE — TELEPHONE ENCOUNTER
Called to review blood pressure management with patient. Previously stopped labetalol and started losartan. Will send a [place] message to follow-up.       Janette Fernandez DO  [facility]  12/7/2023 10:13 AM

## 2023-12-07 NOTE — TELEPHONE ENCOUNTER
----- Message from Earl Terry DO sent at 12/1/2023 11:40 AM EST -----  Hows blood pressure, any side effects from the statin medication.

## 2023-12-09 DIAGNOSIS — F32.1 CURRENT MODERATE EPISODE OF MAJOR DEPRESSIVE DISORDER WITHOUT PRIOR EPISODE (HCC): ICD-10-CM

## 2023-12-11 RX ORDER — VENLAFAXINE HYDROCHLORIDE 150 MG/1
150 CAPSULE, EXTENDED RELEASE ORAL DAILY
Qty: 30 CAPSULE | Refills: 0 | Status: SHIPPED | OUTPATIENT
Start: 2023-12-11

## 2023-12-14 ENCOUNTER — TELEPHONE (OUTPATIENT)
Dept: NEUROLOGY | Facility: CLINIC | Age: 33
End: 2023-12-14

## 2023-12-14 NOTE — TELEPHONE ENCOUNTER
Called and LVM for patient regarding confirmation for upcoming appt w/ Dr. Indira Guzman. Provided patient w/ appt time, date, and location.

## 2023-12-20 DIAGNOSIS — I10 ESSENTIAL HYPERTENSION: ICD-10-CM

## 2023-12-20 RX ORDER — LOSARTAN POTASSIUM 50 MG/1
50 TABLET ORAL DAILY
Qty: 90 TABLET | Refills: 0 | Status: SHIPPED | OUTPATIENT
Start: 2023-12-20

## 2023-12-21 ENCOUNTER — OFFICE VISIT (OUTPATIENT)
Dept: NEUROLOGY | Facility: CLINIC | Age: 33
End: 2023-12-21
Payer: MEDICARE

## 2023-12-21 ENCOUNTER — CLINICAL SUPPORT (OUTPATIENT)
Dept: NUTRITION | Facility: HOSPITAL | Age: 33
End: 2023-12-21
Payer: MEDICARE

## 2023-12-21 VITALS
HEIGHT: 63 IN | BODY MASS INDEX: 39.23 KG/M2 | WEIGHT: 221.4 LBS | DIASTOLIC BLOOD PRESSURE: 80 MMHG | HEART RATE: 115 BPM | SYSTOLIC BLOOD PRESSURE: 140 MMHG

## 2023-12-21 VITALS — BODY MASS INDEX: 39.74 KG/M2 | WEIGHT: 224.32 LBS

## 2023-12-21 DIAGNOSIS — E78.2 MIXED HYPERLIPIDEMIA: ICD-10-CM

## 2023-12-21 DIAGNOSIS — G47.19 EXCESSIVE DAYTIME SLEEPINESS: ICD-10-CM

## 2023-12-21 DIAGNOSIS — R06.83 SNORING: ICD-10-CM

## 2023-12-21 DIAGNOSIS — E66.09 CLASS 2 OBESITY DUE TO EXCESS CALORIES WITHOUT SERIOUS COMORBIDITY WITH BODY MASS INDEX (BMI) OF 39.0 TO 39.9 IN ADULT: Primary | ICD-10-CM

## 2023-12-21 DIAGNOSIS — G47.00 INSOMNIA, UNSPECIFIED TYPE: ICD-10-CM

## 2023-12-21 DIAGNOSIS — G47.33 OSA (OBSTRUCTIVE SLEEP APNEA): ICD-10-CM

## 2023-12-21 DIAGNOSIS — I10 HYPERTENSION, UNSPECIFIED TYPE: ICD-10-CM

## 2023-12-21 DIAGNOSIS — E66.01 MORBID OBESITY (HCC): ICD-10-CM

## 2023-12-21 PROCEDURE — 99204 OFFICE O/P NEW MOD 45 MIN: CPT | Performed by: INTERNAL MEDICINE

## 2023-12-21 PROCEDURE — 97802 MEDICAL NUTRITION INDIV IN: CPT

## 2023-12-21 NOTE — PROGRESS NOTES
" Nutrition Assessment Form    Patient Name: Stella Sorenson    YOB: 1990    Sex: Adult     Assessment Date: 12/21/2023  Start Time: 1:40 PM Stop Time: 2:40 PM Total Minutes: 60     Data:  Present at session: self and spouse   Parent/Patient Concerns/reason for visit: \"I would like to know how to eat better.\"   Medical Dx/Reason for Referral: E78.2 (ICD-10-CM) - Mixed hyperlipidemia  E66.01 (ICD-10-CM) - Morbid obesity (HCC)   Past Medical History:   Diagnosis Date    Anxiety     Asthma     Cervical cyst     Depression     Fatty liver     Hypertension     Hypertriglyceridemia     Mixed hyperlipidemia     Ovarian cyst     Ovarian cyst     left       Current Outpatient Medications   Medication Sig Dispense Refill    albuterol (PROVENTIL HFA,VENTOLIN HFA) 90 mcg/act inhaler Inhale 2 puffs every 6 (six) hours as needed for wheezing      amphetamine-dextroamphetamine (ADDERALL XR, 5MG,) 5 MG 24 hr capsule Take 1 capsule (5 mg total) by mouth every morning Max Daily Amount: 5 mg 30 capsule 0    losartan (COZAAR) 50 mg tablet Take 1 tablet (50 mg total) by mouth daily 90 tablet 0    omeprazole (PriLOSEC) 20 mg delayed release capsule Take 1 capsule by mouth once daily 30 capsule 0    QUEtiapine (SEROquel) 50 mg tablet Take 1 tablet (50 mg total) by mouth daily at bedtime 30 tablet 0    rosuvastatin (CRESTOR) 5 mg tablet Take 1 tablet (5 mg total) by mouth daily 30 tablet 0    venlafaxine (EFFEXOR-XR) 150 mg 24 hr capsule Take 1 capsule by mouth once daily 30 capsule 0     No current facility-administered medications for this visit.        Additional Meds/Supplements: Allergy med 10 mg    Special Learning Needs/barriers to learning/any new barriers None   Height: HC Readings from Last 5 Encounters:   No data found for HC      Weight: Wt Readings from Last 10 Encounters:   12/21/23 100 kg (221 lb 6.4 oz)   12/01/23 99.3 kg (219 lb)   11/02/23 102 kg (225 lb)   11/01/23 102 kg (225 lb)   08/03/23 100 kg (221 lb) " "  04/28/23 104 kg (229 lb)   04/11/23 103 kg (228 lb)   01/12/23 103 kg (228 lb)   10/11/22 103 kg (228 lb)   06/30/22 103 kg (226 lb)     Estimated body mass index is 39.22 kg/m² as calculated from the following:    Height as of an earlier encounter on 12/21/23: 5' 3\" (1.6 m).    Weight as of an earlier encounter on 12/21/23: 100 kg (221 lb 6.4 oz).   Recent Weight Change: [x]Yes     []No  Amount: + 3 lbs      Energy Needs: [facility] Equation:  6480-9523 kcal (1.2-1.4 stress factor), 52-62 g protein (1.0-1.2 g/kg IBW)   Allergies   Allergen Reactions    Tylenol [Acetaminophen] Anaphylaxis    Kiwi Extract - Food Allergy Itching    Pineapple Extract - Food Allergy Itching    Latex Rash    or intolerances    Social History     Substance and Sexual Activity   Alcohol Use Yes     0 x/wk or month  1 or 2 or 3 or 4 or____ drinks/session   Mixed drinks/ wine/ beer   Social History     Tobacco Use   Smoking Status Never   Smokeless Tobacco Never       Who shops? patient and spouse   Who cooks/cooking methods/Eating out/take out habits   spouse  1-2 x per week - [employer]   Exercise: None - Walking   Other: ie: Sleep habits/ stress level/ work habits household-lives with ?/ food security Stress: 5  Sleep: 5-6 hours per night, sleep study today  Work: [employer]  Lives with kids and   Lives with   Food security: Good   Prior Nutritional Counseling? []Yes     [x]No  When:      Why:         Diet Hx:  Breakfast:    Sometimes SKIP  1 big bowl of Captain crunch or Cheerios with whole milk  Tea with 5 tsp sugar SKIP at work or takeout   Without work - 12 PM   Lunch:    Snacks - Applesauce pouches, granola bars or candy   [place]  Water with powder mix or Soda - Pepsi or Diet sodas 11-1 PM   Dinner:    Chicken, sausage, beef  Potatoes, noodles, or rice  Vegetables at times - Steamables (green beans, peas, brussel sprouts, broccoli, etc.) 5-8 PM   Snacks:    Oreos - 12 cookies with whole milk  Popcorn   Kettle cooked " chips  Lunch meat - roll ups  Ice cream - Quarts  Cereal or breakfast bars  Water intake - Link tea, Crystal lite Varies   Other Notes/ Initial Assessment: Patient presents to visit with , Jhoan. Stella would like information on what to eat related to pre-DM, elevated lipid labs, and weight concerns. They both complete the food shopping with Jhoan mainly completing the cooking at home. Dining out/fast food includes McDonalds multiple times per week. Due to pt's job, eating on-the-go is typical. 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 Breakfast is also limited and is typically cereal with milk or skipped. Discussed the importance of eating 3 meals daily and not skipping, and how metabolism is affected.  Also discussed adding in small snacks or 5-6 small meals daily if desired vs 3 larger ones, and appropriate options and portions.  Appropriate timing of meals, including eating within 1 hr of waking and eating meals slowly 20mins/meals, minimizing mindless/boredom or habitual eating, etc was also mentioned. Currently the couple lives with Jhoan's mother which has been a barrier in making dietary changes. Does not currently participate in regular physical activity. We discussed purchasing ready to eat options such as shakes, low fat cheese sticks, fruit, hard-boiled eggs and other options. Reviewed high Triglyceride and elevated LDL nutrition therapy with cooking methods and food options dicussed. Reinforced the importance of hydration and consistent CHO paired with protein and fiber. Handouts given included CHO counting, DM plate method, Healthy Portions DM booklet, High and Mediterranean diet information per request. All questions answered to the bets of my ability with follow up in March.       Updated assessment (Follow up note only):           Nutrition Diagnosis:   Food and nutrition related knowledge deficit  related to Lack of prior exposure  to accurate nutrition related information as evidenced by New medical diagnosis or change in existing diagnosis or condition       Any change or new dx since previous visit:     Nutrition Diagnosis:         Medical Nutrition Therapy Intervention:  []Individualized Meal Plan []Understanding Lab Values   [x]Basic Pathophysiology of Disease: Pre-DM; Hyperlipidemia reviewed.  []Food/Medication Interactions   []Food Diary [x]Exercise: Reviewed importance of 150 minutes per week as able.    [x]Lifestyle/Behavior Modification Techniques: Meal preparation ahead of time (1-2 days per week). []Medication, Mechanism of Action   [x]Label Reading: CHO/ Na/ Fat: Consistent CHO reinforced with goals given 30-45 g per meal; Fats discussed as well.  []Self Blood Glucose Monitoring   [x]Weight/BMI Goals: gain/lose/maintain: 1-2# per week as goal. []Other -           Comprehension: []Excellent  []Very Good  [x]Good  []Fair   []Poor    Receptivity: []Excellent  []Very Good  [x]Good  []Fair   []Poor    Expected Compliance: []Excellent  []Very Good  [x]Good  []Fair   []Poor        Goals (initial)/ Progress made on previous goals/new goals:  1. Stella will consume 30-45 g carbs per meal 4-5 days per week by our next visit.    2. Stella will decrease Pepsi intake from daily to 5 days per week by our next visit.    3. Stella will choose one meal preparation day to prepare breakfast and lunch for work weekly by our next visit.        No follow-ups on file.  Labs:  CMP  Lab Results   Component Value Date    K 4.1 11/30/2022     11/30/2022    CO2 21 11/30/2022    BUN 15 11/30/2022    CREATININE 0.80 11/30/2022    GLUF 108 (H) 11/30/2022    CALCIUM 9.5 11/30/2022    AST 23 11/30/2023    ALT 29 11/30/2023    ALKPHOS 32 (L) 11/30/2023    EGFR 100 01/18/2022       BMP  Lab Results   Component Value Date    CALCIUM 9.5 11/30/2022    K 4.1 11/30/2022    CO2 21 11/30/2022     11/30/2022    BUN 15 11/30/2022    CREATININE 0.80 11/30/2022      "  Lipids  No results found for: \"CHOL\"  Lab Results   Component Value Date    HDL 35 (L) 11/30/2023    HDL 36 07/31/2023    HDL 42 (L) 04/21/2023     Lab Results   Component Value Date    LDLCALC 116 (H) 11/30/2023    LDLCALC 116 (H) 07/31/2023    LDLCALC  04/21/2023      Comment:      Calculated LDL invalid, triglycerides >400 mg/dl  This screening LDL is a calculated result.   It does not have the accuracy of the Direct Measured LDL in the monitoring of patients with hyperlipidemia and/or statin therapy.   Direct Measure LDL ([id number]) must be ordered separately in these patients.     Lab Results   Component Value Date    TRIG 265 (H) 11/30/2023    TRIG 317 (H) 07/31/2023    TRIG 511 (H) 04/21/2023     No results found for: \"CHOLHDL\"    Hemoglobin A1C  Lab Results   Component Value Date    HGBA1C 5.8 (H) 11/30/2023       Fasting Glucose  Lab Results   Component Value Date    GLUF 108 (H) 11/30/2022       Insulin     Thyroid  No results found for: \"TSH\", \"E6VBPIR\", \"Y1RUPBF\", \"THYROIDAB\"    Hepatic Function Panel  Lab Results   Component Value Date    ALT 29 11/30/2023    AST 23 11/30/2023    GGT 35 12/13/2017    ALKPHOS 32 (L) 11/30/2023       Celiac Disease Antibody Panel  No results found for: \"ENDOMYSIAL IGA\", \"GLIADIN IGA\", \"GLIADIN IGG\", \"IGA\", \"TISSUE TRANSGLUT AB\", \"TTG IGA\"   Iron  No results found for: \"IRON\", \"TIBC\", \"FERRITIN\"         Elizabeth Mitchell RD  [facility]  [address] [place] [address] [place] [id number]    "

## 2023-12-21 NOTE — PATIENT INSTRUCTIONS
Sleep hygiene tips:    Keep a consistent bedtime and wake up time.  This will lead to a more regular sleep schedule and avoid periods of sleep deprivation or periods of extended wakefulness during the night.    Avoid napping, especially naps lasting longer than 1 hour or naps late in the day, which will likely affect your ability to fall asleep that night.    Limit caffeine, avoiding caffeine after lunch to allow it to get out of your system and not affect your ability to fall asleep or the quality of your sleep    Limit alcohol, alcohol can be sedating but also activating as it metabolizes, causing you to awaken from sleep.  It can affect the quality of your sleep by not letting you get into the more refreshing stages of sleep.    Avoid nicotine, of course not smoking or vaping at all is best, but nicotine is a stimulant and should be avoided near bedtime and during the night    Exercise, Daytime physical activity is encouraged, in particular 4-6 hours before bedtime, as this may help you to fall asleep more easily and quality of sleep is improved.  Rigorous exercise within 3 hours of bedtime is discouraged.    Keep the sleep environment quiet and dark - Noise and light exposure during the night can disrupt sleep.  White noise or ear plugs are often recommended to reduce noise.  Using black out shades or an eye mask is commonly recommended to reduce light.  This also includes avoiding exposure to television or technology near bedtime, as this can have an impact on circadian rhythms by shifting sleep time later.    Bedroom clock - Avoid checking the time at night  This includes alarm clocks and other time pieces such as watches and phones.  Checking the time increases cognitive arousal and prolongs wakefulness.    Evening eating - Avoid a large meal near bedtime, but don't go to bed hungry.  Eat a healthy and filling meal in the evening without over-eating and avoid late night snacks.    There are some on-line  resources that do require a fee that can be of help.  Two credible websites are as follows:    Http://Jipio/cbt-online-insomnia-treatment.html    Http://www.Strike[employer]/    An cornell used by the [facility] is as follows:    CBT-I     Go[facility] To Sleep by the [facility]

## 2023-12-21 NOTE — PROGRESS NOTES
Sleep Consultation   Stella Sorenson 33 y.o. adult MRN: [id number]      Reason for consultation: ALEJANDRA    Requesting physician: Kevyn Duran, DO Psych    Assessment/Plan    Suspected sleep apnea  Mallampati class 4, Body mass index is 39.22 kg/m².,  .    Previously diagnosed ALEJANDRA 11/12/15 AHI 10.2, supine AHI 11.4, nonsupine 2.6, and oxygen layo 81%  S/s: Snoring, gasping, witnessed apneas, excessive daytime sleepiness  [place] score: 10  I discussed in depth the diagnostic studies and treatment options involved with obstructive sleep apnea  I also discussed in depth the risk of leaving sleep apnea untreated including hypertension, heart failure, arrhythmia, MI and stroke.  The patient is agreeable to undergo testing and treatment of obstructive sleep apnea.  He/she understands the pitfalls he/she may encounter along the way and is willing to attempt CPAP treatment.     Plan  Ordered in lab sleep study  Patient is amenable to CPAP use    2.  Snoring  As above    3.  Excessive daytime sleepiness  As above    4.  Obesity  Counseled patient on lifestyle modifications including diet and exercise  I explained that the best way to decrease severity of sleep apnea is to lose weight    5.  Chronic insomnia  Patient reports sleeping 5 to 6 hours per night  On the weekends she sleeps 12 hours  Counseled patient on sleep hygiene as she is drinking caffeine late in the day  Also she uses her phone in bed    Plan  Provided sleep hygiene instructions  I will monitor for improvement with treatment of sleep apnea  If there is continued difficulty we will reevaluate at next visit  Concern for delayed sleep-wake phase disorder as patient reports she has always been a night owl  Consider sleep diary at next visit    6.  Hypertension  Blood pressure 140/80 today  Currently on losartan 50 mg daily  I explained that treatment of obstructive sleep apnea can improve blood pressure control    History of Present Illness   HPI:  Stella MARY Sorenson  is a 33 y.o. adult with PMHx hypertension, ADHD, asthma, depression/anxiety, bipolar 2, obesity, hyperlipidemia who presents for evaluation of obstructive sleep apnea and insomnia.  She was diagnosed with mild obstructive sleep apnea in 2015.  She was told at that time that treatment was not necessary.  She is tired and sleepy throughout the day.  She has an [place] score of 10.  She is snoring loudly, and gasping in her sleep.  She has been told she stops breathing.  She presents today with her partner.    She goes to bed at 10 PM and falls asleep by midnight during the week.  She wakes up every hour for up to 30 minutes of time.  She wakes up at 6:30 AM for work.  On the weekends she will sleep for up to 12 hours.  She goes to bed at 1 AM and falls asleep by 2 AM.  She wakes up at 12 noon.  She sometimes will take naps at 2 PM for up to 2 to 3 hours.  She takes Seroquel 50 mg at 9 PM.    She reports frequent morning headaches, decreased memory concentration.  She feels her mood has worsened.    She drinks 16 ounces of soda throughout the day including at dinner.  She frequently drinks tea that has caffeine in it.  She drinks alcohol occasionally.          Review of Systems      Genitourinary none   Cardiology none   Gastrointestinal none   Neurology none   Constitutional none   Integumentary none   Psychiatry none   Musculoskeletal none   Pulmonary none   ENT none   Endocrine none   Hematological none         Historical Information   Past Medical History:   Diagnosis Date    Anxiety     Asthma     Cervical cyst     Depression     Fatty liver     Hypertension     Hypertriglyceridemia     Mixed hyperlipidemia     Ovarian cyst     Ovarian cyst     left     Past Surgical History:   Procedure Laterality Date    OH ESOPHAGOGASTRODUODENOSCOPY TRANSORAL DIAGNOSTIC N/A 4/16/2019    Procedure: ESOPHAGOGASTRODUODENOSCOPY (EGD);  Surgeon: Blake Irby MD;  Location: [facility];  Service: Gastroenterology    TOOTH  EXTRACTION      WISDOM TOOTH EXTRACTION       Family History   Problem Relation Age of Onset    Hypertension Mother     Arthritis Father     Bipolar disorder Father     Heart disease Father         cardiac disorder    Hypertension Father     Hypertension Sister     No Known Problems Sister     Arthritis Family     Hypertension Family     Hypertension Family     Colon cancer Neg Hx     Ovarian cancer Neg Hx     Breast cancer Neg Hx     Uterine cancer Neg Hx      Social History     Socioeconomic History    Marital status: Single     Spouse name: Not on file    Number of children: Not on file    Years of education: Not on file    Highest education level: Not on file   Occupational History    Not on file   Tobacco Use    Smoking status: Never    Smokeless tobacco: Never   Vaping Use    Vaping status: Never Used   Substance and Sexual Activity    Alcohol use: Yes    Drug use: No    Sexual activity: Yes     Partners: Male     Birth control/protection: None   Other Topics Concern    Not on file   Social History Narrative    Caffeine use     Social Determinants of Health     Financial Resource Strain: High Risk (11/2/2023)    Overall Financial Resource Strain (CARDIA)     Difficulty of Paying Living Expenses: Very hard   Food Insecurity: Food Insecurity Present (11/2/2023)    Hunger Vital Sign     Worried About Running Out of Food in the Last Year: Often true     Ran Out of Food in the Last Year: Often true   Transportation Needs: Unmet Transportation Needs (11/2/2023)    PRAPARE - Transportation     Lack of Transportation (Medical): Yes     Lack of Transportation (Non-Medical): Yes   Physical Activity: Not on file   Stress: Not on file   Social Connections: Not on file   Intimate Partner Violence: Not on file   Housing Stability: Low Risk  (11/2/2023)    Housing Stability Vital Sign     Unable to Pay for Housing in the Last Year: No     Number of Places Lived in the Last Year: 1     Unstable Housing in the Last Year: No  "      Occupational History: Employed full-time    Meds/Allergies   Allergies   Allergen Reactions    Tylenol [Acetaminophen] Anaphylaxis    Kiwi Extract - Food Allergy Itching    Pineapple Extract - Food Allergy Itching    Latex Rash       Home medications:  Prior to Admission medications    Medication Sig Start Date End Date Taking? Authorizing Provider   albuterol (PROVENTIL HFA,VENTOLIN HFA) 90 mcg/act inhaler Inhale 2 puffs every 6 (six) hours as needed for wheezing   Yes Historical Provider, MD   amphetamine-dextroamphetamine (ADDERALL XR, 5MG,) 5 MG 24 hr capsule Take 1 capsule (5 mg total) by mouth every morning Max Daily Amount: 5 mg 6/7/23 12/21/23 Yes Ken Zhu MD   losartan (COZAAR) 50 mg tablet Take 1 tablet (50 mg total) by mouth daily 12/20/23  Yes Semaj Najera,    omeprazole (PriLOSEC) 20 mg delayed release capsule Take 1 capsule by mouth once daily 11/14/23  Yes Semaj Najera, DO   QUEtiapine (SEROquel) 50 mg tablet Take 1 tablet (50 mg total) by mouth daily at bedtime 11/1/23 12/21/23 Yes Kevyn uDran,    rosuvastatin (CRESTOR) 5 mg tablet Take 1 tablet (5 mg total) by mouth daily 12/1/23  Yes Semaj Najera, DO   venlafaxine (EFFEXOR-XR) 150 mg 24 hr capsule Take 1 capsule by mouth once daily 12/11/23  Yes Mariya Garcia,        Vitals:   Blood pressure 140/80, pulse (!) 115, height 5' 3\" (1.6 m), weight 100 kg (221 lb 6.4 oz), last menstrual period 10/26/2023., Neck circumference 16 inches, Body mass index is 39.22 kg/m².       Physical Exam  General: Awake alert and oriented x 3, conversant without conversational dyspnea, NAD, normal affect  HEENT:  PERRL, Sclera noninjected, nonicteric OU, Nares patent,  no craniofacial abnormalities, Mucous membranes, moist, no oral lesions, normal dentition, Mallampati class 4  NECK:  Trachea midline, no accessory muscle use, no stridor, no cervical or supraclavicular adenopathy, JVP not elevated  CARDIAC: Reg, single " "s1/S2, no m/r/g  PULM: CTA bilaterally no wheezing, rhonchi or rales  EXT: No cyanosis, no clubbing, no edema, normal capillary refill  NEURO: no focal neurologic deficits, AAOx3, moving all extremities appropriately    Labs: I have personally reviewed pertinent lab results.  Lab Results   Component Value Date    WBC 7.39 04/21/2023    HGB 12.1 04/21/2023    HCT 36.6 04/21/2023    MCV 89 04/21/2023     04/21/2023      Lab Results   Component Value Date    CALCIUM 9.5 11/30/2022    K 4.1 11/30/2022    CO2 21 11/30/2022     11/30/2022    BUN 15 11/30/2022    CREATININE 0.80 11/30/2022     No results found for: \"IRON\", \"TIBC\", \"FERRITIN\"  No results found for: \"FEMQUURY82\"  No results found for: \"FOLATE\"      Arterial Blood Gas result:  NA    Sleep studies:  Ordered    Compliance Data:                                     TAYLOR Farias MD  [facility]   "

## 2023-12-25 ENCOUNTER — HOSPITAL ENCOUNTER (EMERGENCY)
Facility: HOSPITAL | Age: 33
Discharge: HOME | End: 2023-12-25
Attending: EMERGENCY MEDICINE
Payer: MEDICARE

## 2023-12-25 ENCOUNTER — APPOINTMENT (EMERGENCY)
Dept: CT IMAGING | Facility: HOSPITAL | Age: 33
End: 2023-12-25
Payer: MEDICARE

## 2023-12-25 VITALS
OXYGEN SATURATION: 97 % | DIASTOLIC BLOOD PRESSURE: 102 MMHG | TEMPERATURE: 98.9 F | RESPIRATION RATE: 18 BRPM | SYSTOLIC BLOOD PRESSURE: 178 MMHG | HEART RATE: 132 BPM

## 2023-12-25 DIAGNOSIS — K52.9 GASTROENTERITIS: Primary | ICD-10-CM

## 2023-12-25 DIAGNOSIS — R10.9 ABDOMINAL PAIN: ICD-10-CM

## 2023-12-25 LAB
ALBUMIN SERPL BCP-MCNC: 4.6 G/DL (ref 3.5–5)
ALP SERPL-CCNC: 35 U/L (ref 34–104)
ALT SERPL W P-5'-P-CCNC: 37 U/L (ref 7–52)
ANION GAP SERPL CALCULATED.3IONS-SCNC: 12 MMOL/L
APTT PPP: 30 SECONDS (ref 23–37)
AST SERPL W P-5'-P-CCNC: 17 U/L (ref 5–45)
BACTERIA UR QL AUTO: ABNORMAL /HPF
BASOPHILS # BLD AUTO: 0.04 THOUSANDS/ÂΜL (ref 0–0.1)
BASOPHILS NFR BLD AUTO: 0 % (ref 0–1)
BILIRUB SERPL-MCNC: 0.49 MG/DL (ref 0.2–1)
BILIRUB UR QL STRIP: NEGATIVE
BUN SERPL-MCNC: 18 MG/DL (ref 5–25)
CALCIUM SERPL-MCNC: 8.8 MG/DL (ref 8.4–10.2)
CHLORIDE SERPL-SCNC: 103 MMOL/L (ref 96–108)
CLARITY UR: ABNORMAL
CO2 SERPL-SCNC: 23 MMOL/L (ref 21–32)
COLOR UR: YELLOW
CREAT SERPL-MCNC: 0.82 MG/DL (ref 0.6–1.3)
EOSINOPHIL # BLD AUTO: 0.12 THOUSAND/ÂΜL (ref 0–0.61)
EOSINOPHIL NFR BLD AUTO: 1 % (ref 0–6)
ERYTHROCYTE [DISTWIDTH] IN BLOOD BY AUTOMATED COUNT: 13.3 % (ref 11.6–15.1)
EXT PREGNANCY TEST URINE: NEGATIVE
EXT. CONTROL: NORMAL
GLUCOSE SERPL-MCNC: 156 MG/DL (ref 65–140)
GLUCOSE UR STRIP-MCNC: ABNORMAL MG/DL
HCG SERPL QL: NEGATIVE
HCT VFR BLD AUTO: 37.7 % (ref 36.5–46.1)
HGB BLD-MCNC: 12.8 G/DL (ref 12–15.4)
HGB UR QL STRIP.AUTO: NEGATIVE
IMM GRANULOCYTES # BLD AUTO: 0.07 THOUSAND/UL (ref 0–0.2)
IMM GRANULOCYTES NFR BLD AUTO: 1 % (ref 0–2)
INR PPP: 1.02 (ref 0.84–1.19)
KETONES UR STRIP-MCNC: ABNORMAL MG/DL
LACTATE SERPL-SCNC: 1.8 MMOL/L (ref 0.5–2)
LEUKOCYTE ESTERASE UR QL STRIP: ABNORMAL
LIPASE SERPL-CCNC: 15 U/L (ref 11–82)
LYMPHOCYTES # BLD AUTO: 0.58 THOUSANDS/ÂΜL (ref 0.6–4.47)
LYMPHOCYTES NFR BLD AUTO: 4 % (ref 14–44)
MAGNESIUM SERPL-MCNC: 1.6 MG/DL (ref 1.9–2.7)
MCH RBC QN AUTO: 30 PG (ref 26.8–34.3)
MCHC RBC AUTO-ENTMCNC: 34 G/DL (ref 31.4–37.4)
MCV RBC AUTO: 88 FL (ref 82–98)
MONOCYTES # BLD AUTO: 0.38 THOUSAND/ÂΜL (ref 0.17–1.22)
MONOCYTES NFR BLD AUTO: 3 % (ref 4–12)
MUCOUS THREADS UR QL AUTO: ABNORMAL
NEUTROPHILS # BLD AUTO: 12.25 THOUSANDS/ÂΜL (ref 1.85–7.62)
NEUTS SEG NFR BLD AUTO: 91 % (ref 43–75)
NITRITE UR QL STRIP: NEGATIVE
NON-SQ EPI CELLS URNS QL MICRO: ABNORMAL /HPF
NRBC BLD AUTO-RTO: 0 /100 WBCS
PH UR STRIP.AUTO: 6 [PH]
PLATELET # BLD AUTO: 338 THOUSANDS/UL (ref 149–390)
PMV BLD AUTO: 10.3 FL (ref 8.9–12.7)
POTASSIUM SERPL-SCNC: 3.8 MMOL/L (ref 3.5–5.3)
PROT SERPL-MCNC: 7.5 G/DL (ref 6.4–8.4)
PROT UR STRIP-MCNC: ABNORMAL MG/DL
PROTHROMBIN TIME: 14 SECONDS (ref 11.6–14.5)
RBC # BLD AUTO: 4.27 MILLION/UL (ref 3.88–5.12)
RBC #/AREA URNS AUTO: ABNORMAL /HPF
SODIUM SERPL-SCNC: 138 MMOL/L (ref 135–147)
SP GR UR STRIP.AUTO: 1.03 (ref 1–1.03)
UROBILINOGEN UR STRIP-ACNC: <2 MG/DL
WBC # BLD AUTO: 13.44 THOUSAND/UL (ref 4.31–10.16)
WBC #/AREA URNS AUTO: ABNORMAL /HPF

## 2023-12-25 PROCEDURE — 99284 EMERGENCY DEPT VISIT MOD MDM: CPT

## 2023-12-25 PROCEDURE — 80053 COMPREHEN METABOLIC PANEL: CPT | Performed by: EMERGENCY MEDICINE

## 2023-12-25 PROCEDURE — 99285 EMERGENCY DEPT VISIT HI MDM: CPT | Performed by: EMERGENCY MEDICINE

## 2023-12-25 PROCEDURE — 81025 URINE PREGNANCY TEST: CPT | Performed by: EMERGENCY MEDICINE

## 2023-12-25 PROCEDURE — 36415 COLL VENOUS BLD VENIPUNCTURE: CPT | Performed by: EMERGENCY MEDICINE

## 2023-12-25 PROCEDURE — 87086 URINE CULTURE/COLONY COUNT: CPT | Performed by: EMERGENCY MEDICINE

## 2023-12-25 PROCEDURE — 83735 ASSAY OF MAGNESIUM: CPT | Performed by: EMERGENCY MEDICINE

## 2023-12-25 PROCEDURE — 81001 URINALYSIS AUTO W/SCOPE: CPT | Performed by: EMERGENCY MEDICINE

## 2023-12-25 PROCEDURE — 84703 CHORIONIC GONADOTROPIN ASSAY: CPT | Performed by: EMERGENCY MEDICINE

## 2023-12-25 PROCEDURE — 96374 THER/PROPH/DIAG INJ IV PUSH: CPT

## 2023-12-25 PROCEDURE — 87493 C DIFF AMPLIFIED PROBE: CPT | Performed by: EMERGENCY MEDICINE

## 2023-12-25 PROCEDURE — 83690 ASSAY OF LIPASE: CPT | Performed by: EMERGENCY MEDICINE

## 2023-12-25 PROCEDURE — 85730 THROMBOPLASTIN TIME PARTIAL: CPT | Performed by: EMERGENCY MEDICINE

## 2023-12-25 PROCEDURE — 87040 BLOOD CULTURE FOR BACTERIA: CPT | Performed by: EMERGENCY MEDICINE

## 2023-12-25 PROCEDURE — 96375 TX/PRO/DX INJ NEW DRUG ADDON: CPT

## 2023-12-25 PROCEDURE — 83605 ASSAY OF LACTIC ACID: CPT | Performed by: EMERGENCY MEDICINE

## 2023-12-25 PROCEDURE — G1004 CDSM NDSC: HCPCS

## 2023-12-25 PROCEDURE — 96361 HYDRATE IV INFUSION ADD-ON: CPT

## 2023-12-25 PROCEDURE — 87505 NFCT AGENT DETECTION GI: CPT | Performed by: EMERGENCY MEDICINE

## 2023-12-25 PROCEDURE — 74177 CT ABD & PELVIS W/CONTRAST: CPT

## 2023-12-25 PROCEDURE — 85025 COMPLETE CBC W/AUTO DIFF WBC: CPT | Performed by: EMERGENCY MEDICINE

## 2023-12-25 PROCEDURE — 85610 PROTHROMBIN TIME: CPT | Performed by: EMERGENCY MEDICINE

## 2023-12-25 RX ORDER — METRONIDAZOLE 500 MG/1
500 TABLET ORAL 3 TIMES DAILY
Qty: 21 TABLET | Refills: 0 | Status: SHIPPED | OUTPATIENT
Start: 2023-12-25 | End: 2024-01-01

## 2023-12-25 RX ORDER — CIPROFLOXACIN 500 MG/1
500 TABLET, FILM COATED ORAL ONCE
Status: COMPLETED | OUTPATIENT
Start: 2023-12-25 | End: 2023-12-25

## 2023-12-25 RX ORDER — METRONIDAZOLE 500 MG/1
500 TABLET ORAL ONCE
Status: COMPLETED | OUTPATIENT
Start: 2023-12-25 | End: 2023-12-25

## 2023-12-25 RX ORDER — FAMOTIDINE 10 MG/ML
20 INJECTION, SOLUTION INTRAVENOUS ONCE
Status: COMPLETED | OUTPATIENT
Start: 2023-12-25 | End: 2023-12-25

## 2023-12-25 RX ORDER — ONDANSETRON 4 MG/1
4 TABLET, ORALLY DISINTEGRATING ORAL EVERY 6 HOURS PRN
Qty: 20 TABLET | Refills: 0 | Status: SHIPPED | OUTPATIENT
Start: 2023-12-25

## 2023-12-25 RX ORDER — CIPROFLOXACIN 500 MG/1
500 TABLET, FILM COATED ORAL 2 TIMES DAILY
Qty: 14 TABLET | Refills: 0 | Status: SHIPPED | OUTPATIENT
Start: 2023-12-25 | End: 2024-01-01

## 2023-12-25 RX ORDER — SODIUM CHLORIDE 9 MG/ML
150 INJECTION, SOLUTION INTRAVENOUS CONTINUOUS
Status: DISCONTINUED | OUTPATIENT
Start: 2023-12-25 | End: 2023-12-25 | Stop reason: HOSPADM

## 2023-12-25 RX ORDER — ONDANSETRON 2 MG/ML
4 INJECTION INTRAMUSCULAR; INTRAVENOUS ONCE
Status: COMPLETED | OUTPATIENT
Start: 2023-12-25 | End: 2023-12-25

## 2023-12-25 RX ADMIN — IOHEXOL 100 ML: 350 INJECTION, SOLUTION INTRAVENOUS at 05:04

## 2023-12-25 RX ADMIN — METRONIDAZOLE 500 MG: 500 TABLET ORAL at 06:00

## 2023-12-25 RX ADMIN — ONDANSETRON 4 MG: 2 INJECTION INTRAMUSCULAR; INTRAVENOUS at 03:56

## 2023-12-25 RX ADMIN — SODIUM CHLORIDE 1000 ML: 0.9 INJECTION, SOLUTION INTRAVENOUS at 03:56

## 2023-12-25 RX ADMIN — FAMOTIDINE 20 MG: 10 INJECTION INTRAVENOUS at 03:56

## 2023-12-25 RX ADMIN — CIPROFLOXACIN HYDROCHLORIDE 500 MG: 500 TABLET, FILM COATED ORAL at 06:00

## 2023-12-25 NOTE — SEPSIS NOTE
Sepsis Note   Stella Sorenson 33 y.o. adult MRN: [id number]  Unit/Bed#: ED-41 Encounter: [id number]       Initial Sepsis Screening       Row Name 12/25/23 0548                Is the patient's history suggestive of a new or worsening infection? Yes (Proceed)  -AO        Suspected source of infection acute abdominal infection  -AO        Indicate SIRS criteria Leukocytosis (WBC > 36930 IJL) OR Leukopenia (WBC <4000 IJL) OR Bandemia (WBC >10% bands);Tachycardia > 90 bpm  -AO        Are two or more of the above signs & symptoms of infection both present and new to the patient? No  -AO        Assess for evidence of organ dysfunction: Are any of the below criteria present within 6 hours of suspected infection and SIRS criteria that are NOT considered to be chronic conditions? --                  User Key  (r) = Recorded By, (t) = Taken By, (c) = Cosigned By      Initials Name Provider Type    AO Jose Alfredo Lees MD Physician                        There is no height or weight on file to calculate BMI.  Wt Readings from Last 1 Encounters:   12/21/23 102 kg (224 lb 5.1 oz)        Ideal body weight: 52.4 kg (115 lb 8.3 oz)  Adjusted ideal body weight: 72.1 kg (159 lb 0.6 oz)

## 2023-12-25 NOTE — ED PROVIDER NOTES
History  Chief Complaint   Patient presents with    Vomiting     Pt states she has been having N/V/D since 2000 yesterday evening. Pt also states she is now having dizziness and fatigue. Denies other sx at this time.     Patient is a 33 year old biological female with worsening N/V/D since last night with abdominal pain. LMP - 11/3/23 and usually irregular. No urinary sx. No fever. No travel. (+) ill contact. No raw meat, eggs, fish or recent abx use. Was last seen at [facility] on 12/1/23 for essential HTN. [facility] website checked on this patient and last Rx filled was on 2/13/23 for adderall for 30 day supply. (+) dizziness. No GI bleeding. No abdominal surgery as per Epic. Declines pain medication.       History provided by:  Patient   used: No    Vomiting  Associated symptoms: abdominal pain and diarrhea    Associated symptoms: no fever        Prior to Admission Medications   Prescriptions Last Dose Informant Patient Reported? Taking?   QUEtiapine (SEROquel) 50 mg tablet  Self No No   Sig: Take 1 tablet (50 mg total) by mouth daily at bedtime   albuterol (PROVENTIL HFA,VENTOLIN HFA) 90 mcg/act inhaler  Self Yes No   Sig: Inhale 2 puffs every 6 (six) hours as needed for wheezing   amphetamine-dextroamphetamine (ADDERALL XR, 5MG,) 5 MG 24 hr capsule   No No   Sig: Take 1 capsule (5 mg total) by mouth every morning Max Daily Amount: 5 mg   losartan (COZAAR) 50 mg tablet   No No   Sig: Take 1 tablet (50 mg total) by mouth daily   omeprazole (PriLOSEC) 20 mg delayed release capsule   No No   Sig: Take 1 capsule by mouth once daily   rosuvastatin (CRESTOR) 5 mg tablet   No No   Sig: Take 1 tablet (5 mg total) by mouth daily   venlafaxine (EFFEXOR-XR) 150 mg 24 hr capsule   No No   Sig: Take 1 capsule by mouth once daily      Facility-Administered Medications: None       Past Medical History:   Diagnosis Date    Anxiety     Asthma     Cervical cyst     Depression     Fatty liver     Hypertension      Hypertriglyceridemia     Mixed hyperlipidemia     Ovarian cyst     Ovarian cyst     left       Past Surgical History:   Procedure Laterality Date    CO ESOPHAGOGASTRODUODENOSCOPY TRANSORAL DIAGNOSTIC N/A 4/16/2019    Procedure: ESOPHAGOGASTRODUODENOSCOPY (EGD);  Surgeon: Blake Irby MD;  Location: [facility];  Service: Gastroenterology    TOOTH EXTRACTION      WISDOM TOOTH EXTRACTION         Family History   Problem Relation Age of Onset    Hypertension Mother     Arthritis Father     Bipolar disorder Father     Heart disease Father         cardiac disorder    Hypertension Father     Hypertension Sister     No Known Problems Sister     Arthritis Family     Hypertension Family     Hypertension Family     Colon cancer Neg Hx     Ovarian cancer Neg Hx     Breast cancer Neg Hx     Uterine cancer Neg Hx      I have reviewed and agree with the history as documented.    E-Cigarette/Vaping    E-Cigarette Use Never User      E-Cigarette/Vaping Substances    Nicotine No     THC No     CBD No     Flavoring No     Other No     Unknown No      Social History     Tobacco Use    Smoking status: Never    Smokeless tobacco: Never   Vaping Use    Vaping status: Never Used   Substance Use Topics    Alcohol use: Yes    Drug use: No       Review of Systems   Constitutional:  Negative for fever.   Gastrointestinal:  Positive for abdominal pain, diarrhea, nausea and vomiting. Negative for blood in stool.   Genitourinary:  Negative for difficulty urinating.   Neurological:  Positive for dizziness.   All other systems reviewed and are negative.      Physical Exam  Physical Exam  Vitals and nursing note reviewed.   Constitutional:       General: Stella is in acute distress (moderate).   HENT:      Head: Normocephalic and atraumatic.      Mouth/Throat:      Comments: Mucous membranes somewhat moist .  Eyes:      General: No scleral icterus.  Cardiovascular:      Rate and Rhythm: Regular rhythm. Tachycardia present.      Heart sounds:  Normal heart sounds. No murmur heard.  Pulmonary:      Effort: Pulmonary effort is normal. No respiratory distress.      Breath sounds: Normal breath sounds. No stridor. No wheezing, rhonchi or rales.   Abdominal:      General: Bowel sounds are normal. There is no distension.      Palpations: Abdomen is soft.      Tenderness: There is abdominal tenderness (diffuse). There is no guarding or rebound.   Musculoskeletal:         General: No deformity.      Cervical back: Normal range of motion and neck supple.      Right lower leg: No edema.      Left lower leg: No edema.   Skin:     General: Skin is warm and dry.      Coloration: Skin is not jaundiced.      Findings: No erythema or rash.   Neurological:      General: No focal deficit present.      Mental Status: Stella is alert and oriented to person, place, and time.   Psychiatric:         Mood and Affect: Mood normal.         Vital Signs  ED Triage Vitals   Temperature Pulse Respirations Blood Pressure SpO2   12/25/23 0305 12/25/23 0303 12/25/23 0303 12/25/23 0303 12/25/23 0303   98.9 °F (37.2 °C) (!) 132 18 (!) 178/102 97 %      Temp Source Heart Rate Source Patient Position - Orthostatic VS BP Location FiO2 (%)   12/25/23 0305 12/25/23 0303 12/25/23 0303 12/25/23 0303 --   Oral Monitor Lying Right arm       Pain Score       --                  Vitals:    12/25/23 0303   BP: (!) 178/102   Pulse: (!) 132   Patient Position - Orthostatic VS: Lying         Visual Acuity      ED Medications  Medications   sodium chloride 0.9 % infusion (has no administration in time range)   ciprofloxacin (CIPRO) tablet 500 mg (has no administration in time range)   metroNIDAZOLE (FLAGYL) tablet 500 mg (has no administration in time range)   sodium chloride 0.9 % bolus 1,000 mL (1,000 mL Intravenous New Bag 12/25/23 0356)   ondansetron (ZOFRAN) injection 4 mg (4 mg Intravenous Given 12/25/23 0356)   Famotidine (PF) (PEPCID) injection 20 mg (20 mg Intravenous Given 12/25/23 0356)    iohexol (OMNIPAQUE) 350 MG/ML injection (MULTI-DOSE) 100 mL (100 mL Intravenous Given 12/25/23 0504)       Diagnostic Studies  Results Reviewed       Procedure Component Value Units Date/Time    Urine culture [[id number]] Collected: 12/25/23 0354    Lab Status: In process Specimen: Urine, Clean Catch Updated: 12/25/23 0456    hCG, qualitative pregnancy [[id number]]  (Normal) Collected: 12/25/23 0346    Lab Status: Final result Specimen: Blood from Arm, Right Updated: 12/25/23 0430     Preg, Serum Negative    Urine Microscopic [[id number]]  (Abnormal) Collected: 12/25/23 0354    Lab Status: Final result Specimen: Urine, Clean Catch Updated: 12/25/23 0426     RBC, UA 4-10 /hpf      WBC, UA 4-10 /hpf      Epithelial Cells Occasional /hpf      Bacteria, UA None Seen /hpf      MUCUS THREADS Moderate    Lactic acid, plasma (w/reflex if result > 2.0) [[id number]]  (Normal) Collected: 12/25/23 0346    Lab Status: Final result Specimen: Blood from Arm, Right Updated: 12/25/23 0425     LACTIC ACID 1.8 mmol/L     Narrative:      Result may be elevated if tourniquet was used during collection.    Comprehensive metabolic panel [[id number]]  (Abnormal) Collected: 12/25/23 0346    Lab Status: Final result Specimen: Blood from Arm, Right Updated: 12/25/23 0424     Sodium 138 mmol/L      Potassium 3.8 mmol/L      Chloride 103 mmol/L      CO2 23 mmol/L      ANION GAP 12 mmol/L      BUN 18 mg/dL      Creatinine 0.82 mg/dL      Glucose 156 mg/dL      Calcium 8.8 mg/dL      AST 17 U/L      ALT 37 U/L      Alkaline Phosphatase 35 U/L      Total Protein 7.5 g/dL      Albumin 4.6 g/dL      Total Bilirubin 0.49 mg/dL      eGFR --    Narrative:      Notes:     1. eGFR calculation is only valid for adults 18 years and older.  2. EGFR calculation cannot be performed for patients who are transgender, non-binary, or whose legal sex, sex at birth, and gender identity differ.    Lipase [[id number]]  (Normal) Collected: 12/25/23 0346    Lab Status:  Final result Specimen: Blood from Arm, Right Updated: 12/25/23 0424     Lipase 15 u/L     Magnesium [[id number]]  (Abnormal) Collected: 12/25/23 0346    Lab Status: Final result Specimen: Blood from Arm, Right Updated: 12/25/23 0424     Magnesium 1.6 mg/dL     UA w Reflex to Microscopic w Reflex to Culture [[id number]]  (Abnormal) Collected: 12/25/23 0354    Lab Status: Final result Specimen: Urine, Clean Catch Updated: 12/25/23 0420     Color, UA Yellow     Clarity, UA Turbid     Specific Gravity, UA 1.035     pH, UA 6.0     Leukocytes, UA Small     Nitrite, UA Negative     Protein,  (2+) mg/dl      Glucose, UA Trace mg/dl      Ketones, UA Trace mg/dl      Urobilinogen, UA <2.0 mg/dl      Bilirubin, UA Negative     Occult Blood, UA Negative    Protime-INR [[id number]]  (Normal) Collected: 12/25/23 0346    Lab Status: Final result Specimen: Blood from Arm, Right Updated: 12/25/23 0417     Protime 14.0 seconds      INR 1.02    APTT [[id number]]  (Normal) Collected: 12/25/23 0346    Lab Status: Final result Specimen: Blood from Arm, Right Updated: 12/25/23 0417     PTT 30 seconds     Stool Enteric Bacterial Panel by PCR [[id number]] Collected: 12/25/23 0354    Lab Status: In process Specimen: Stool from Rectum Updated: 12/25/23 0407    Clostridium difficile toxin by PCR with EIA [[id number]] Collected: 12/25/23 0354    Lab Status: In process Specimen: Stool from Per Rectum Updated: 12/25/23 0407    CBC and differential [[id number]]  (Abnormal) Collected: 12/25/23 0346    Lab Status: Final result Specimen: Blood from Arm, Right Updated: 12/25/23 0402     WBC 13.44 Thousand/uL      RBC 4.27 Million/uL      Hemoglobin 12.8 g/dL      Hematocrit 37.7 %      MCV 88 fL      MCH 30.0 pg      MCHC 34.0 g/dL      RDW 13.3 %      MPV 10.3 fL      Platelets 338 Thousands/uL      nRBC 0 /100 WBCs      Neutrophils Relative 91 %      Immat GRANS % 1 %      Lymphocytes Relative 4 %      Monocytes Relative 3 %      Eosinophils Relative  1 %      Basophils Relative 0 %      Neutrophils Absolute 12.25 Thousands/µL      Immature Grans Absolute 0.07 Thousand/uL      Lymphocytes Absolute 0.58 Thousands/µL      Monocytes Absolute 0.38 Thousand/µL      Eosinophils Absolute 0.12 Thousand/µL      Basophils Absolute 0.04 Thousands/µL     Narrative:      This is an appended report.  These results have been appended to a previously verified report.    Blood culture #1 [[id number]] Collected: 12/25/23 0346    Lab Status: In process Specimen: Blood from Arm, Right Updated: 12/25/23 0354    Blood culture #2 [[id number]] Collected: 12/25/23 0346    Lab Status: In process Specimen: Blood from Arm, Right Updated: 12/25/23 0354    POCT pregnancy, urine [[id number]]  (Normal) Resulted: 12/25/23 0352    Lab Status: Final result Updated: 12/25/23 0352     EXT Preg Test, Ur Negative     Control Valid                   CT abdomen pelvis with contrast   ED Interpretation by Jose Alfredo Lees MD (12/25 0543)   FINDINGS:     ABDOMEN     LOWER CHEST:  No clinically significant abnormality identified in the visualized lower chest.     LIVER/BILIARY TREE: Diffuse hepatic steatosis.     GALLBLADDER:  No calcified gallstones. No pericholecystic inflammatory change.     SPLEEN:  Unremarkable.     PANCREAS:  Unremarkable.     ADRENAL GLANDS:  Unremarkable.     KIDNEYS/URETERS:  Unremarkable. No hydronephrosis.     STOMACH AND BOWEL:  Unremarkable.     APPENDIX: Normal appendix.     ABDOMINOPELVIC CAVITY:  No ascites.  No pneumoperitoneum.  No lymphadenopathy.     VESSELS:  Unremarkable for patient's age.     PELVIS     REPRODUCTIVE ORGANS: Mildly enlarged right ovary with a physiologic corpus luteal cyst seen. Stable exophytic fundal subserosal fibroid again seen.     URINARY BLADDER:  Unremarkable.     ABDOMINAL WALL/INGUINAL REGIONS:  Unremarkable.     OSSEOUS STRUCTURES:  No acute fracture or destructive osseous lesion.     IMPRESSION:     Mild asymmetric enlargement of the right  ovary which con   tains a physiologic corpus luteum. Pelvic ultrasound should be obtained to exclude torsion.     Workstation performed: [id number]        Final Result by Flakito Diaz DO (12/25 0541)      Mild asymmetric enlargement of the right ovary which contains a physiologic corpus luteum. Pelvic ultrasound should be obtained to exclude torsion.      Workstation performed: [id number]                    Procedures  Procedures         ED Course  ED Course as of 12/25/23 0552   Mon Dec 25, 2023   0543 Labs and CT d/w patient. Patient feels better. Abdomen nontender on re-exam. Although patient meets sepsis criteria I doubt patient is septic and patient does not want to be admitted. Will tx with oral abx .                            Initial Sepsis Screening       Row Name 12/25/23 0548                Is the patient's history suggestive of a new or worsening infection? Yes (Proceed)  -AO        Suspected source of infection acute abdominal infection  -AO        Indicate SIRS criteria Leukocytosis (WBC > 33227 IJL) OR Leukopenia (WBC <4000 IJL) OR Bandemia (WBC >10% bands);Tachycardia > 90 bpm  -AO        Are two or more of the above signs & symptoms of infection both present and new to the patient? Yes (Proceed)  -AO        Assess for evidence of organ dysfunction: Are any of the below criteria present within 6 hours of suspected infection and SIRS criteria that are NOT considered to be chronic conditions? --  No  -AO                  User Key  (r) = Recorded By, (t) = Taken By, (c) = Cosigned By      Initials Name Provider Type    AO Jose Alfredo Lees MD Physician                                  Medical Decision Making  DDx including but not limited to: gastroenteritis, food poisoning, metabolic abnormality, dehydration, EHSAN, mesenteric adenitis, appendicitis, IBD, IBS, ileus, bowel obstruction, toxic megacolon, colitis, enteritis, gastritis, PUD, GERD, hepatitis, pancreatitis, cholecystitis, biliary colic,  choledocholithiasis, doubt splenic etiology; renal colic, pyelonephritis, UTI. Doubt cardiac etiology.       Amount and/or Complexity of Data Reviewed  Labs: ordered. Decision-making details documented in ED Course.  Radiology: ordered. Decision-making details documented in ED Course.  ECG/medicine tests: ordered and independent interpretation performed. Decision-making details documented in ED Course.    Risk  Prescription drug management.             Disposition  Final diagnoses:   Gastroenteritis   Abdominal pain     Time reflects when diagnosis was documented in both MDM as applicable and the Disposition within this note       Time User Action Codes Description Comment    12/25/2023  5:50 AM Jose Alfredo Lees Add [K52.9] Gastroenteritis     12/25/2023  5:50 AM Jose Alfredo Lees Add [R10.9] Abdominal pain           ED Disposition       ED Disposition   Discharge    Condition   Stable    Date/Time   Mon Dec 25, 2023  5:50 AM    Comment   Stella Sorenson discharge to home/self care.                   Follow-up Information       Follow up With Specialties Details Why Contact Info    Semaj Najera, DO Family Medicine Call in 2 days Return sooner if increased pain, fever, worsening vomiting and/or diarrhea, bleeding, difficulty urinating, rash. Can get outpatient pelvic ultrasound for asymmetric ovaries. [address] [facility]  [address]  [facility] [address]  [phone]              Patient's Medications   Discharge Prescriptions    CIPROFLOXACIN (CIPRO) 500 MG TABLET    Take 1 tablet (500 mg total) by mouth 2 (two) times a day for 7 days       Start Date: 12/25/2023End Date: 1/1/2024       Order Dose: 500 mg       Quantity: 14 tablet    Refills: 0    METRONIDAZOLE (FLAGYL) 500 MG TABLET    Take 1 tablet (500 mg total) by mouth 3 (three) times a day for 7 days       Start Date: 12/25/2023End Date: 1/1/2024       Order Dose: 500 mg       Quantity: 21 tablet    Refills: 0    ONDANSETRON (ZOFRAN-ODT) 4 MG  DISINTEGRATING TABLET    Take 1 tablet (4 mg total) by mouth every 6 (six) hours as needed for nausea or vomiting       Start Date: 12/25/2023End Date: --       Order Dose: 4 mg       Quantity: 20 tablet    Refills: 0       No discharge procedures on file.    PDMP Review         Value Time User    PDMP Reviewed  Yes 12/25/2023  2:50 AM Jose Alfredo Lees MD            ED Provider  Electronically Signed by             Jose Alfredo Lees MD  12/25/23 0556

## 2023-12-26 ENCOUNTER — VBI (OUTPATIENT)
Dept: FAMILY MEDICINE CLINIC | Facility: CLINIC | Age: 33
End: 2023-12-26

## 2023-12-26 LAB
BACTERIA UR CULT: NORMAL
C DIFF TOX GENS STL QL NAA+PROBE: NEGATIVE
CAMPYLOBACTER DNA SPEC NAA+PROBE: NORMAL
SALMONELLA DNA SPEC QL NAA+PROBE: NORMAL
SHIGA TOXIN STX GENE SPEC NAA+PROBE: NORMAL
SHIGELLA DNA SPEC QL NAA+PROBE: NORMAL

## 2023-12-26 NOTE — TELEPHONE ENCOUNTER
12/26/23 10:21 AM    Patient contacted post ED visit, first outreach attempt made. Message was left for patient to return a call to the [facility] Department at [facility]: Phone [phone].    Thank you.  [place] Luciano MA  [facility]

## 2023-12-26 NOTE — TELEPHONE ENCOUNTER
12/26/23 10:25 AM    Patient contacted post ED visit, [facility] department spoke with patient/caregiver and outreach was successful.  Patient states she is doing better today. She believes it may have been something that she ate. She is aware to reach out to PCP if any further concerns or questions.  Thank you.  [place] Luciano MA  [facility]

## 2023-12-27 ENCOUNTER — OFFICE VISIT (OUTPATIENT)
Dept: PSYCHIATRY | Facility: CLINIC | Age: 33
End: 2023-12-27
Payer: COMMERCIAL

## 2023-12-27 DIAGNOSIS — G47.01 INSOMNIA DUE TO MEDICAL CONDITION: ICD-10-CM

## 2023-12-27 DIAGNOSIS — F90.2 ATTENTION DEFICIT HYPERACTIVITY DISORDER (ADHD), COMBINED TYPE: ICD-10-CM

## 2023-12-27 DIAGNOSIS — F39 UNSPECIFIED MOOD (AFFECTIVE) DISORDER (HCC): Primary | ICD-10-CM

## 2023-12-27 DIAGNOSIS — E66.01 MORBIDLY OBESE (HCC): ICD-10-CM

## 2023-12-27 DIAGNOSIS — F41.1 GENERALIZED ANXIETY DISORDER: ICD-10-CM

## 2023-12-27 PROBLEM — F32.1 CURRENT MODERATE EPISODE OF MAJOR DEPRESSIVE DISORDER WITHOUT PRIOR EPISODE (HCC): Status: RESOLVED | Noted: 2017-10-27 | Resolved: 2023-12-27

## 2023-12-27 PROCEDURE — 99214 OFFICE O/P EST MOD 30 MIN: CPT

## 2023-12-27 RX ORDER — QUETIAPINE FUMARATE 50 MG/1
50 TABLET, FILM COATED ORAL
Qty: 30 TABLET | Refills: 1 | Status: SHIPPED | OUTPATIENT
Start: 2023-12-27 | End: 2024-02-25

## 2023-12-27 RX ORDER — VENLAFAXINE HYDROCHLORIDE 37.5 MG/1
37.5 CAPSULE, EXTENDED RELEASE ORAL DAILY
Qty: 30 CAPSULE | Refills: 1 | Status: SHIPPED | OUTPATIENT
Start: 2023-12-27 | End: 2024-02-25

## 2023-12-27 RX ORDER — VENLAFAXINE HYDROCHLORIDE 150 MG/1
150 CAPSULE, EXTENDED RELEASE ORAL DAILY
Qty: 30 CAPSULE | Refills: 1 | Status: SHIPPED | OUTPATIENT
Start: 2023-12-27 | End: 2024-02-25

## 2023-12-27 NOTE — PSYCH
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 they need to call/contact the outpatient office including this writer, call/contact crisis and/orattend to the nearest Emergency Department for immediate evaluation.    Risk of Harm to Self:   Based on today's assessment, Stella presents the following risk of harm to self: minimal    Risk of Harm to Others:  Based on today's assessment, Stella presents the following risk of harm to others: minimal    Psychotherapy Provided:     Individual psychotherapy provided: Counseling was provided during the session today for 10 minutes.    Treatment Plan:    Completed and signed during the session: Not applicable - Treatment Plan not due at this session    This note was not shared with the patient due to reasonable likelihood of causing patient harm    Kevyn Duran DO    Visit time: 9:30-10:20am    This note has been constructed using a voice recognition system. There may be translation, syntax, or grammatical errors. If you have any questions, please contact the dictating provider.

## 2023-12-30 LAB
BACTERIA BLD CULT: NORMAL
BACTERIA BLD CULT: NORMAL

## 2024-01-01 NOTE — PATIENT INSTRUCTIONS
Please call the office nursing staff for medication issues including refills, problems obtaining medications, side effects, etc at [phone].     Please return for a follow up appointment as discussed. If you are running late or are unable to attend your appointment, please call our [place] office at [phone].    If you are in psychological crisis including not limited to suicidal/homicidal thoughts or thoughts of self-injury, do not hesitate to call/contact 988 which is the national mental health emergency line (like 911 but for mental health), your County Crisis hotline (see below), or go to the nearest emergency department.  [facility] Crisis: [phone]  [facility] Crisis: [phone]  [facility] Crisis: [phone]  [facility] Crisis: [phone]  [facility] Crisis: [phone]  [facility] Crisis: [phone]  [facility] Crisis: [phone]  National Suicide Prevention Hotline: 1-273.390.5172     
-Increased irritability, crying for long periods of time

## 2024-01-09 ENCOUNTER — HOSPITAL ENCOUNTER (EMERGENCY)
Facility: HOSPITAL | Age: 34
Discharge: HOME | End: 2024-01-09
Attending: EMERGENCY MEDICINE | Admitting: EMERGENCY MEDICINE
Payer: MEDICARE

## 2024-01-09 ENCOUNTER — APPOINTMENT (EMERGENCY)
Dept: RADIOLOGY | Facility: HOSPITAL | Age: 34
End: 2024-01-09
Payer: MEDICARE

## 2024-01-09 VITALS
OXYGEN SATURATION: 98 % | RESPIRATION RATE: 18 BRPM | SYSTOLIC BLOOD PRESSURE: 140 MMHG | BODY MASS INDEX: 39.68 KG/M2 | WEIGHT: 224 LBS | TEMPERATURE: 98 F | HEART RATE: 94 BPM | DIASTOLIC BLOOD PRESSURE: 89 MMHG

## 2024-01-09 DIAGNOSIS — S52.002A CLOSED FRACTURE OF PROXIMAL END OF LEFT ULNA, UNSPECIFIED FRACTURE MORPHOLOGY, INITIAL ENCOUNTER: Primary | ICD-10-CM

## 2024-01-09 PROCEDURE — 99284 EMERGENCY DEPT VISIT MOD MDM: CPT | Performed by: EMERGENCY MEDICINE

## 2024-01-09 PROCEDURE — 99283 EMERGENCY DEPT VISIT LOW MDM: CPT

## 2024-01-09 PROCEDURE — 73080 X-RAY EXAM OF ELBOW: CPT

## 2024-01-09 RX ORDER — IBUPROFEN 600 MG/1
600 TABLET ORAL ONCE
Status: COMPLETED | OUTPATIENT
Start: 2024-01-09 | End: 2024-01-09

## 2024-01-09 RX ADMIN — IBUPROFEN 600 MG: 600 TABLET, FILM COATED ORAL at 10:13

## 2024-01-09 NOTE — ED PROVIDER NOTES
"History  Chief Complaint   Patient presents with    Elbow Injury     PT \"fell on ice yesterday onto left elbow, and now it hurts to move it, also have limited movement\"     Stella is a 33-year-old gender clear individual who presents to the emergency department after falling onto her left elbow during the afternoon of 1/8/2024.  Patient stated they did not lose consciousness or have any prodromal symptoms prior to falling, describes mechanical fall in which they had lost her balance and landed directly on their left elbow.  Patient states they have been utilizing over-the-counter medications as well as ice without sustained improvement in symptomology.  No nausea or vomiting.  No numbness or paresthesias.  Range of motion is limited with regards to the swelling of the left elbow.    Patient is here for evaluation of potential osseous pathology given the persistence of swelling of the left elbow.      History provided by:  Patient   used: No        Prior to Admission Medications   Prescriptions Last Dose Informant Patient Reported? Taking?   QUEtiapine (SEROquel) 50 mg tablet   No No   Sig: Take 1 tablet (50 mg total) by mouth daily at bedtime   albuterol (PROVENTIL HFA,VENTOLIN HFA) 90 mcg/act inhaler  Self Yes No   Sig: Inhale 2 puffs every 6 (six) hours as needed for wheezing   amphetamine-dextroamphetamine (ADDERALL XR, 5MG,) 5 MG 24 hr capsule   No No   Sig: Take 1 capsule (5 mg total) by mouth every morning Max Daily Amount: 5 mg   losartan (COZAAR) 50 mg tablet   No No   Sig: Take 1 tablet (50 mg total) by mouth daily   omeprazole (PriLOSEC) 20 mg delayed release capsule   No No   Sig: Take 1 capsule by mouth once daily   ondansetron (ZOFRAN-ODT) 4 mg disintegrating tablet   No No   Sig: Take 1 tablet (4 mg total) by mouth every 6 (six) hours as needed for nausea or vomiting   rosuvastatin (CRESTOR) 5 mg tablet   No No   Sig: Take 1 tablet (5 mg total) by mouth daily   venlafaxine " (EFFEXOR-XR) 150 mg 24 hr capsule   No No   Sig: Take 1 capsule (150 mg total) by mouth daily   venlafaxine (EFFEXOR-XR) 37.5 mg 24 hr capsule   No No   Sig: Take 1 capsule (37.5 mg total) by mouth daily      Facility-Administered Medications: None       Past Medical History:   Diagnosis Date    Anxiety     Asthma     Cervical cyst     Current moderate episode of major depressive disorder without prior episode (HCC)     Depression     Fatty liver     Hypertension     Hypertriglyceridemia     Mixed hyperlipidemia     Ovarian cyst     Ovarian cyst     left       Past Surgical History:   Procedure Laterality Date    VA ESOPHAGOGASTRODUODENOSCOPY TRANSORAL DIAGNOSTIC N/A 4/16/2019    Procedure: ESOPHAGOGASTRODUODENOSCOPY (EGD);  Surgeon: Blaek Irby MD;  Location: [facility];  Service: Gastroenterology    TOOTH EXTRACTION      WISDOM TOOTH EXTRACTION         Family History   Problem Relation Age of Onset    Hypertension Mother     Arthritis Father     Bipolar disorder Father     Heart disease Father         cardiac disorder    Hypertension Father     Hypertension Sister     No Known Problems Sister     Arthritis Family     Hypertension Family     Hypertension Family     Colon cancer Neg Hx     Ovarian cancer Neg Hx     Breast cancer Neg Hx     Uterine cancer Neg Hx      I have reviewed and agree with the history as documented.    E-Cigarette/Vaping    E-Cigarette Use Never User      E-Cigarette/Vaping Substances    Nicotine No     THC No     CBD No     Flavoring No     Other No     Unknown No      Social History     Tobacco Use    Smoking status: Never    Smokeless tobacco: Never   Vaping Use    Vaping status: Never Used   Substance Use Topics    Alcohol use: Yes    Drug use: No        Review of Systems   Constitutional:  Negative for chills and fever.   HENT:  Negative for ear pain and sore throat.    Eyes:  Negative for pain and visual disturbance.   Respiratory:  Negative for cough and shortness of breath.     Cardiovascular:  Negative for chest pain and palpitations.   Gastrointestinal:  Negative for abdominal pain and vomiting.   Genitourinary:  Negative for dysuria and hematuria.   Musculoskeletal:  Negative for arthralgias and back pain.   Skin:  Negative for color change and rash.   Neurological:  Negative for seizures and syncope.   All other systems reviewed and are negative.      Physical Exam  ED Triage Vitals [01/09/24 0939]   Temperature Pulse Respirations Blood Pressure SpO2   98 °F (36.7 °C) 94 18 140/89 98 %      Temp Source Heart Rate Source Patient Position - Orthostatic VS BP Location FiO2 (%)   Oral Monitor Lying Right arm --      Pain Score       (S) 8             Orthostatic Vital Signs  Vitals:    01/09/24 0939   BP: 140/89   Pulse: 94   Patient Position - Orthostatic VS: Lying       Physical Exam  Vitals and nursing note reviewed.   Constitutional:       Appearance: Normal appearance. Stella is well-developed. Stella is not ill-appearing or diaphoretic.   HENT:      Head: Normocephalic and atraumatic.      Right Ear: External ear normal.      Left Ear: External ear normal.      Nose: Nose normal. No congestion or rhinorrhea.      Mouth/Throat:      Mouth: Mucous membranes are moist.      Pharynx: No oropharyngeal exudate or posterior oropharyngeal erythema.   Eyes:      General:         Right eye: No discharge.         Left eye: No discharge.      Extraocular Movements: Extraocular movements intact.      Conjunctiva/sclera: Conjunctivae normal.      Pupils: Pupils are equal, round, and reactive to light.   Cardiovascular:      Rate and Rhythm: Normal rate and regular rhythm.      Heart sounds: No murmur heard.  Pulmonary:      Effort: Pulmonary effort is normal. No respiratory distress.      Breath sounds: Normal breath sounds.   Abdominal:      Palpations: Abdomen is soft.      Tenderness: There is no abdominal tenderness.   Musculoskeletal:         General: Swelling and tenderness present. No  deformity or signs of injury.      Cervical back: Normal range of motion and neck supple. No rigidity.      Right lower leg: No edema.      Left lower leg: No edema.      Comments: Tenderness and swelling of the elbow joint on the left side.  Compartments soft.  Range of motion limited secondary to swelling and pain.  Neurovascularly intact.  Preserved sensation of the radial, medial, and ulnar distributions.  Preserved range of motion in both active and passive at both the left shoulder joint as well as the left wrist.   Skin:     General: Skin is warm and dry.      Capillary Refill: Capillary refill takes less than 2 seconds.   Neurological:      General: No focal deficit present.      Mental Status: Stella is alert.   Psychiatric:         Mood and Affect: Mood normal.         ED Medications  Medications   ibuprofen (MOTRIN) tablet 600 mg (600 mg Oral Given 1/9/24 1013)       Diagnostic Studies  Results Reviewed       None                   XR elbow 3+ views LEFT   ED Interpretation by Boo Jiang MD (01/09 0517)   Findings consistent with an intra-articular proximal ulnar fracture.  Patient will be placed in a sling and recommended follow-up with orthopedics for continued evaluation of symptoms.  Independently read and assessed by Boo Jiang.  Abnormal elbow x-ray.      Final Result by Shan Ellington MD (01/09 0926)      Nondisplaced fracture of the medial aspect of the ulnar coronoid process.      Resident: MCKAY PICKENS I, the attending radiologist, have reviewed the images and agree with the final report above.      Workstation performed: [id number]               Procedures  Procedures      ED Course                                       Medical Decision Making  Stella is a 33-year-old patient who presents to the emergency department after falling onto the left elbow.  Concern for potential fracture versus contusion.    DDX including but not limited to: arthritis, bursitis,  tendinitis, sprain, strain, fracture,  cellulitis, osteomyelitis, gout, Lyme disease, rheumatologic process; doubt septic arthritis or DVT or arterial occlusion.     Based on initial presenting complaints, x-ray imaging was conducted of the affected joint.  Appeared consistent with a closed fracture of the left ulna.  Secondary to the tenderness of the area, patient was placed in a sling, provided with orthopedics follow-up, and instructed to continue with anti-inflammatory pathology and symptomatic management at home.  Based off of x-ray imaging, I do not see anything to reduce at this time and patient was immobilized with a sling.    Amount and/or Complexity of Data Reviewed  Radiology: ordered and independent interpretation performed.     Details: Radiology consistent with a ulnar fracture.    Risk  Prescription drug management.          Disposition  Final diagnoses:   Closed fracture of proximal end of left ulna, unspecified fracture morphology, initial encounter     Time reflects when diagnosis was documented in both MDM as applicable and the Disposition within this note       Time User Action Codes Description Comment    1/9/2024 10:47 AM Boo Jiang Add [S52.002A] Closed fracture of proximal end of left ulna, unspecified fracture morphology, initial encounter           ED Disposition       ED Disposition   Discharge    Condition   Stable    Date/Time   Tue Jan 9, 2024 10:45 AM    Comment   Stella Sorenson discharge to home/self care.                   Follow-up Information       Follow up With Specialties Details Why Contact Info Additional Information    Semaj Najera,  Family Medicine Schedule an appointment as soon as possible for a visit   [address]  [address]  [facility] [id number] [phone]       [facility] Emergency Department Emergency Medicine  As needed, If symptoms worsen [address] [id number] [phone] [facility]  [facility], [address], [place], [place], [address]    [facility] Schedule an appointment as soon as possible for a visit  As soon as possible. [address]  [address]  [facility] [address] [phone] [facility] [place], [address], [address], [place], [address] [phone]            Discharge Medication List as of 1/9/2024 11:01 AM        CONTINUE these medications which have NOT CHANGED    Details   albuterol (PROVENTIL HFA,VENTOLIN HFA) 90 mcg/act inhaler Inhale 2 puffs every 6 (six) hours as needed for wheezing, Historical Med      amphetamine-dextroamphetamine (ADDERALL XR, 5MG,) 5 MG 24 hr capsule Take 1 capsule (5 mg total) by mouth every morning Max Daily Amount: 5 mg, Starting Wed 6/7/2023, Until Thu 12/21/2023, No Print      losartan (COZAAR) 50 mg tablet Take 1 tablet (50 mg total) by mouth daily, Starting Wed 12/20/2023, Normal      omeprazole (PriLOSEC) 20 mg delayed release capsule Take 1 capsule by mouth once daily, Normal      ondansetron (ZOFRAN-ODT) 4 mg disintegrating tablet Take 1 tablet (4 mg total) by mouth every 6 (six) hours as needed for nausea or vomiting, Starting Mon 12/25/2023, Print      QUEtiapine (SEROquel) 50 mg tablet Take 1 tablet (50 mg total) by mouth daily at bedtime, Starting Wed 12/27/2023, Until Sun 2/25/2024, Normal      rosuvastatin (CRESTOR) 5 mg tablet Take 1 tablet (5 mg total) by mouth daily, Starting Fri 12/1/2023, Normal      !! venlafaxine (EFFEXOR-XR) 150 mg 24 hr capsule Take 1 capsule (150 mg total) by mouth daily, Starting Wed 12/27/2023, Until Sun 2/25/2024, Normal      !! venlafaxine (EFFEXOR-XR) 37.5 mg 24 hr capsule Take 1 capsule (37.5 mg total) by mouth daily, Starting Wed 12/27/2023, Until Sun 2/25/2024, Normal       !! - Potential duplicate medications found. Please discuss with provider.            PDMP  Review         Value Time User    PDMP Reviewed  Yes 12/25/2023  2:50 AM Jose Alfredo Lees MD             ED Provider  Attending physically available and evaluated Stella Sorenson. I managed the patient along with the ED Attending.    Electronically Signed by           Boo Jiang MD  01/09/24 4913

## 2024-01-09 NOTE — ED ATTENDING ATTESTATION
1/9/2024  I, Dean Love DO, saw and evaluated the patient. I have discussed the patient with the resident/non-physician practitioner and agree with the resident's/non-physician practitioner's findings, Plan of Care, and MDM as documented in the resident's/non-physician practitioner's note, except where noted. All available labs and Radiology studies were reviewed.  I was present for key portions of any procedure(s) performed by the resident/non-physician practitioner and I was immediately available to provide assistance.       At this point I agree with the current assessment done in the Emergency Department.  I have conducted an independent evaluation of this patient a history and physical is as follows:          33-year-old fall yesterday slipped on ice, complaining of left elbow pain, small contusion over left elbow.,  Full range of motion although with some discomfort worse with supination pronation.     MDM  Number of Diagnoses or Management Options  Diagnosis management comments:       Initial ED assessment:   33-year-old fall yesterday after slipping on ice landed on left elbow, complaining of left elbow pain tender over radial head with pain with range of motion on exam    Initial DDx includes but is not limited to:   Elbow fracture versus contusion    Initial ED plan:   X-ray elbow        Final ED summary/disposition:   After evaluation and workup in the emergency department, x-ray suggestive of proximal ulnar fracture, placed in the sling will follow with orthopedics                         ED Course         Critical Care Time  Procedures

## 2024-01-09 NOTE — DISCHARGE INSTRUCTIONS
Return sooner to the Emergency Department if increased pain, swelling, numbness, weakness, fever, redness, vomiting.    Please maintain your arm in a sling as well as continuation of ice or anti-inflammatory medications for pain control.    Please utilize the provided contact information to follow-up with orthopedic providers.

## 2024-01-10 ENCOUNTER — VBI (OUTPATIENT)
Dept: ADMINISTRATIVE | Facility: OTHER | Age: 34
End: 2024-01-10

## 2024-01-10 ENCOUNTER — OFFICE VISIT (OUTPATIENT)
Dept: OBGYN CLINIC | Facility: CLINIC | Age: 34
End: 2024-01-10
Payer: MEDICARE

## 2024-01-10 VITALS
BODY MASS INDEX: 38.48 KG/M2 | DIASTOLIC BLOOD PRESSURE: 111 MMHG | WEIGHT: 217.2 LBS | SYSTOLIC BLOOD PRESSURE: 146 MMHG | HEIGHT: 63 IN | HEART RATE: 96 BPM

## 2024-01-10 DIAGNOSIS — S52.002A CLOSED FRACTURE OF PROXIMAL END OF LEFT ULNA, UNSPECIFIED FRACTURE MORPHOLOGY, INITIAL ENCOUNTER: ICD-10-CM

## 2024-01-10 PROCEDURE — 24670 CLTX ULNAR FX PROX W/O MNPJ: CPT | Performed by: ORTHOPAEDIC SURGERY

## 2024-01-10 PROCEDURE — 99243 OFF/OP CNSLTJ NEW/EST LOW 30: CPT | Performed by: ORTHOPAEDIC SURGERY

## 2024-01-10 NOTE — TELEPHONE ENCOUNTER
01/10/24 2:09 PM    Patient contacted post ED visit, [facility] phone outreaches documented. Patient called practice and scheduled a follow-up ED visit.     Thank you.  Carol Linares  [facility]

## 2024-01-10 NOTE — PROGRESS NOTES
[facility] [address] [id number] [phone]       Stella Sorenson  [id number]  1990    ORTHOPAEDIC SURGERY OUTPATIENT NOTE  1/10/2024      HISTORY:  33 y.o. adult presents for initial evaluation of left elbow injury.  They report a left elbow injury 2 days ago falling backwards onto a straight arm.  Stella was seen at the emergency room with an x-ray and placed into a sling.  Right hand dominant.     Past Medical History:   Diagnosis Date    Anxiety     Asthma     Cervical cyst     Current moderate episode of major depressive disorder without prior episode (HCC)     Depression     Fatty liver     Hypertension     Hypertriglyceridemia     Mixed hyperlipidemia     Ovarian cyst     Ovarian cyst     left       Past Surgical History:   Procedure Laterality Date    [place] ESOPHAGOGASTRODUODENOSCOPY TRANSORAL DIAGNOSTIC N/A 4/16/2019    Procedure: ESOPHAGOGASTRODUODENOSCOPY (EGD);  Surgeon: Blake Irby MD;  Location: [facility];  Service: Gastroenterology    TOOTH EXTRACTION      WISDOM TOOTH EXTRACTION         Social History     Socioeconomic History    Marital status: Single     Spouse name: Not on file    Number of children: Not on file    Years of education: Not on file    Highest education level: Not on file   Occupational History    Not on file   Tobacco Use    Smoking status: Never    Smokeless tobacco: Never   Vaping Use    Vaping status: Never Used   Substance and Sexual Activity    Alcohol use: Yes    Drug use: No    Sexual activity: Yes     Partners: Male     Birth control/protection: None   Other Topics Concern    Not on file   Social History Narrative    Caffeine use     Social Determinants of Health     Financial Resource Strain: High Risk (11/2/2023)    Overall Financial Resource Strain (CARDIA)     Difficulty of Paying Living Expenses: Very hard   Food Insecurity: Food Insecurity Present (11/2/2023)    Hunger  Vital Sign     Worried About Running Out of Food in the Last Year: Often true     Ran Out of Food in the Last Year: Often true   Transportation Needs: Unmet Transportation Needs (11/2/2023)    PRAPARE - Transportation     Lack of Transportation (Medical): Yes     Lack of Transportation (Non-Medical): Yes   Physical Activity: Not on file   Stress: Not on file   Social Connections: Not on file   Intimate Partner Violence: Not on file   Housing Stability: Low Risk  (11/2/2023)    Housing Stability Vital Sign     Unable to Pay for Housing in the Last Year: No     Number of Places Lived in the Last Year: 1     Unstable Housing in the Last Year: No       Family History   Problem Relation Age of Onset    Hypertension Mother     Arthritis Father     Bipolar disorder Father     Heart disease Father         cardiac disorder    Hypertension Father     Hypertension Sister     No Known Problems Sister     Arthritis Family     Hypertension Family     Hypertension Family     Colon cancer Neg Hx     Ovarian cancer Neg Hx     Breast cancer Neg Hx     Uterine cancer Neg Hx         Patient's Medications   New Prescriptions    No medications on file   Previous Medications    ALBUTEROL (PROVENTIL HFA,VENTOLIN HFA) 90 MCG/ACT INHALER    Inhale 2 puffs every 6 (six) hours as needed for wheezing    AMPHETAMINE-DEXTROAMPHETAMINE (ADDERALL XR, 5MG,) 5 MG 24 HR CAPSULE    Take 1 capsule (5 mg total) by mouth every morning Max Daily Amount: 5 mg    LOSARTAN (COZAAR) 50 MG TABLET    Take 1 tablet (50 mg total) by mouth daily    OMEPRAZOLE (PRILOSEC) 20 MG DELAYED RELEASE CAPSULE    Take 1 capsule by mouth once daily    ONDANSETRON (ZOFRAN-ODT) 4 MG DISINTEGRATING TABLET    Take 1 tablet (4 mg total) by mouth every 6 (six) hours as needed for nausea or vomiting    QUETIAPINE (SEROQUEL) 50 MG TABLET    Take 1 tablet (50 mg total) by mouth daily at bedtime    ROSUVASTATIN (CRESTOR) 5 MG TABLET    Take 1 tablet (5 mg total) by mouth daily     "VENLAFAXINE (EFFEXOR-XR) 150 MG 24 HR CAPSULE    Take 1 capsule (150 mg total) by mouth daily    VENLAFAXINE (EFFEXOR-XR) 37.5 MG 24 HR CAPSULE    Take 1 capsule (37.5 mg total) by mouth daily   Modified Medications    No medications on file   Discontinued Medications    No medications on file       Allergies   Allergen Reactions    Tylenol [Acetaminophen] Anaphylaxis    Kiwi Extract - Food Allergy Itching    Pineapple Extract - Food Allergy Itching    Latex Rash        BP (!) 146/111 (BP Location: Right arm, Patient Position: Sitting, Cuff Size: Standard)   Pulse 96   Ht 5' 3\" (1.6 m)   Wt 98.5 kg (217 lb 3.2 oz)   LMP 01/01/2024   BMI 38.48 kg/m²      REVIEW OF SYSTEMS:  Constitutional: Negative.    HEENT: Negative.    Respiratory: Negative.    Skin: Negative.    Neurological: Negative.    Psychiatric/Behavioral: Negative.  Musculoskeletal: Negative except for that mentioned in the HPI.    BP (!) 146/111 (BP Location: Right arm, Patient Position: Sitting, Cuff Size: Standard)   Pulse 96   Ht 5' 3\" (1.6 m)   Wt 98.5 kg (217 lb 3.2 oz)   LMP 01/01/2024   BMI 38.48 kg/m²   Gen: No acute distress, resting comfortably in bed  HEENT: Eyes clear, moist mucus membranes, hearing intact  Respiratory: No audible wheezing or stridor  Cardiovascular: Well Perfused peripherally, 2+ distal pulse  Abdomen: nondistended, no peritoneal signs     PHYSICAL EXAM:    LEFT ELBOW:    Appearance: no wound or ecchymosis    Flexion: 130 degrees  Extension: 10 degrees  Pronation: 80 degrees  Supination: 80 degrees    TTP Lateral Epicondyle: negative  TTP Medial Epicondyle: negative  TTP Olecranon: negative  TTP Radial Head: negative  TTP Biceps Tendon: negative    Strength:  Flexion: 5/5  Extension: 5/5  Pronation: 5/5  Supination: 5/5    Pain with resisted wrist extension: positive  Pain with resisted 3rd finger extension: negative  Pain with resisted wrist flexion: positive    Varus laxity: negative  Valgus laxity: " negative  Milking maneuver: negative  Moving valgus stress test: negative    Cubital tunnel Tinel's: negative    Radial/median/ulnar nerve intact    <2 sec cap refill        IMAGING: X-ray of the left elbow from the emergency room was available for review.  This is independently interpreted and demonstrates minimally displaced anterior medial facet coronoid fracture. there is calcification adjacent to the medial epicondyle    ASSESSMENT AND PLAN:  33 y.o. adult with left anteromedial facet coronoid fracture minimally displaced.  Discussed with the patient conservative management in the form of early range of motion.  They will limit pushing and lifting with the arm.  Patient may take Tylenol for pain and ice the elbow over-the-counter.  Will see Stella back in 4 weeks for reevaluation of motion.  Work note was given.    Fracture / Dislocation Treatment    Date/Time: 1/10/2024 8:45 AM    Performed by: Юлия Antonio DO  Authorized by: Юлия Antonio DO    Patient Location:  [facility] Protocol:  Consent given by: patient    Injury location:  Elbow  Location details:  Left elbow  Injury type:  Fracture  Fracture type: coronoid process    Neurovascular status: Neurovascularly intact    Neurovascular status: Neurovascularly intact        Scribe Attestation      I,:  Roland Lau PA-C am acting as a scribe while in the presence of the attending physician.:       I,:  Юлия Antonio personally performed the services described in this documentation    as scribed in my presence.:

## 2024-01-10 NOTE — LETTER
January 10, 2024     Patient: Stella Sorenson  YOB: 1990  Date of Visit: 1/10/2024      To Whom it May Concern:    Stella Sorenson is under my professional care. Stella was seen in my office on 1/10/2024. Stella may return to work with restrictions, no use of the left arm, no lifting left arm.     If you have any questions or concerns, please don't hesitate to call.         Sincerely,          Юлия Antonio DO        CC: No Recipients

## 2024-01-10 NOTE — LETTER
January 10, 2024     Semaj Najera DO  [address]  [address]  [place] [address]    Patient: Stella Sorenson   YOB: 1990   Date of Visit: 1/10/2024       Dear Dr. Najera:    Thank you for referring Stella Sorenson to me for evaluation. Below are my notes for this consultation.    If you have questions, please do not hesitate to call me. I look forward to following your patient along with you.         Sincerely,        Юлия Antonio DO        CC: No Recipients    Юлия Antonio DO  1/10/2024 11:34 AM  Signed  [facility] [address] [id number] [phone]       Stella Sorenson  [id number]  1990    ORTHOPAEDIC SURGERY OUTPATIENT NOTE  1/10/2024      HISTORY:  33 y.o. adult presents for initial evaluation of left elbow injury.  They report a left elbow injury 2 days ago falling backwards onto a straight arm.  Stella was seen at the emergency room with an x-ray and placed into a sling.  Right hand dominant.     Past Medical History:   Diagnosis Date   • Anxiety    • Asthma    • Cervical cyst    • Current moderate episode of major depressive disorder without prior episode (HCC)    • Depression    • Fatty liver    • Hypertension    • Hypertriglyceridemia    • Mixed hyperlipidemia    • Ovarian cyst    • Ovarian cyst     left       Past Surgical History:   Procedure Laterality Date   • [place] ESOPHAGOGASTRODUODENOSCOPY TRANSORAL DIAGNOSTIC N/A 4/16/2019    Procedure: ESOPHAGOGASTRODUODENOSCOPY (EGD);  Surgeon: Blake Irby MD;  Location: [facility];  Service: Gastroenterology   • TOOTH EXTRACTION     • WISDOM TOOTH EXTRACTION         Social History     Socioeconomic History   • Marital status: Single     Spouse name: Not on file   • Number of children: Not on file   • Years of education: Not on file   • Highest education level: Not on file   Occupational History   • Not on file   Tobacco Use   • Smoking  status: Never   • Smokeless tobacco: Never   Vaping Use   • Vaping status: Never Used   Substance and Sexual Activity   • Alcohol use: Yes   • Drug use: No   • Sexual activity: Yes     Partners: Male     Birth control/protection: None   Other Topics Concern   • Not on file   Social History Narrative    Caffeine use     Social Determinants of Health     Financial Resource Strain: High Risk (11/2/2023)    Overall Financial Resource Strain (CARDIA)    • Difficulty of Paying Living Expenses: Very hard   Food Insecurity: Food Insecurity Present (11/2/2023)    Hunger Vital Sign    • Worried About Running Out of Food in the Last Year: Often true    • Ran Out of Food in the Last Year: Often true   Transportation Needs: Unmet Transportation Needs (11/2/2023)    PRAPARE - Transportation    • Lack of Transportation (Medical): Yes    • Lack of Transportation (Non-Medical): Yes   Physical Activity: Not on file   Stress: Not on file   Social Connections: Not on file   Intimate Partner Violence: Not on file   Housing Stability: Low Risk  (11/2/2023)    Housing Stability Vital Sign    • Unable to Pay for Housing in the Last Year: No    • Number of Places Lived in the Last Year: 1    • Unstable Housing in the Last Year: No       Family History   Problem Relation Age of Onset   • Hypertension Mother    • Arthritis Father    • Bipolar disorder Father    • Heart disease Father         cardiac disorder   • Hypertension Father    • Hypertension Sister    • No Known Problems Sister    • Arthritis Family    • Hypertension Family    • Hypertension Family    • Colon cancer Neg Hx    • Ovarian cancer Neg Hx    • Breast cancer Neg Hx    • Uterine cancer Neg Hx         Patient's Medications   New Prescriptions    No medications on file   Previous Medications    ALBUTEROL (PROVENTIL HFA,VENTOLIN HFA) 90 MCG/ACT INHALER    Inhale 2 puffs every 6 (six) hours as needed for wheezing    AMPHETAMINE-DEXTROAMPHETAMINE (ADDERALL XR, 5MG,) 5 MG 24 HR  "CAPSULE    Take 1 capsule (5 mg total) by mouth every morning Max Daily Amount: 5 mg    LOSARTAN (COZAAR) 50 MG TABLET    Take 1 tablet (50 mg total) by mouth daily    OMEPRAZOLE (PRILOSEC) 20 MG DELAYED RELEASE CAPSULE    Take 1 capsule by mouth once daily    ONDANSETRON (ZOFRAN-ODT) 4 MG DISINTEGRATING TABLET    Take 1 tablet (4 mg total) by mouth every 6 (six) hours as needed for nausea or vomiting    QUETIAPINE (SEROQUEL) 50 MG TABLET    Take 1 tablet (50 mg total) by mouth daily at bedtime    ROSUVASTATIN (CRESTOR) 5 MG TABLET    Take 1 tablet (5 mg total) by mouth daily    VENLAFAXINE (EFFEXOR-XR) 150 MG 24 HR CAPSULE    Take 1 capsule (150 mg total) by mouth daily    VENLAFAXINE (EFFEXOR-XR) 37.5 MG 24 HR CAPSULE    Take 1 capsule (37.5 mg total) by mouth daily   Modified Medications    No medications on file   Discontinued Medications    No medications on file       Allergies   Allergen Reactions   • Tylenol [Acetaminophen] Anaphylaxis   • Kiwi Extract - Food Allergy Itching   • Pineapple Extract - Food Allergy Itching   • Latex Rash        BP (!) 146/111 (BP Location: Right arm, Patient Position: Sitting, Cuff Size: Standard)   Pulse 96   Ht 5' 3\" (1.6 m)   Wt 98.5 kg (217 lb 3.2 oz)   LMP 01/01/2024   BMI 38.48 kg/m²      REVIEW OF SYSTEMS:  Constitutional: Negative.    HEENT: Negative.    Respiratory: Negative.    Skin: Negative.    Neurological: Negative.    Psychiatric/Behavioral: Negative.  Musculoskeletal: Negative except for that mentioned in the HPI.    BP (!) 146/111 (BP Location: Right arm, Patient Position: Sitting, Cuff Size: Standard)   Pulse 96   Ht 5' 3\" (1.6 m)   Wt 98.5 kg (217 lb 3.2 oz)   LMP 01/01/2024   BMI 38.48 kg/m²   Gen: No acute distress, resting comfortably in bed  HEENT: Eyes clear, moist mucus membranes, hearing intact  Respiratory: No audible wheezing or stridor  Cardiovascular: Well Perfused peripherally, 2+ distal pulse  Abdomen: nondistended, no peritoneal signs "     PHYSICAL EXAM:    LEFT ELBOW:    Appearance: no wound or ecchymosis    Flexion: 130 degrees  Extension: 10 degrees  Pronation: 80 degrees  Supination: 80 degrees    TTP Lateral Epicondyle: negative  TTP Medial Epicondyle: negative  TTP Olecranon: negative  TTP Radial Head: negative  TTP Biceps Tendon: negative    Strength:  Flexion: 5/5  Extension: 5/5  Pronation: 5/5  Supination: 5/5    Pain with resisted wrist extension: positive  Pain with resisted 3rd finger extension: negative  Pain with resisted wrist flexion: positive    Varus laxity: negative  Valgus laxity: negative  Milking maneuver: negative  Moving valgus stress test: negative    Cubital tunnel Tinel's: negative    Radial/median/ulnar nerve intact    <2 sec cap refill        IMAGING: X-ray of the left elbow from the emergency room was available for review.  This is independently interpreted and demonstrates minimally displaced anterior medial facet coronoid fracture. there is calcification adjacent to the medial epicondyle    ASSESSMENT AND PLAN:  33 y.o. adult with left anteromedial facet coronoid fracture minimally displaced.  Discussed with the patient conservative management in the form of early range of motion.  They will limit pushing and lifting with the arm.  Patient may take Tylenol for pain and ice the elbow over-the-counter.  Will see Stella back in 4 weeks for reevaluation of motion.  Work note was given.    Fracture / Dislocation Treatment    Date/Time: 1/10/2024 8:45 AM    Performed by: Юлия Antonio DO  Authorized by: Юлия Antonio DO    Patient Location:  [facility] Protocol:  Consent given by: patient    Injury location:  Elbow  Location details:  Left elbow  Injury type:  Fracture  Fracture type: coronoid process    Neurovascular status: Neurovascularly intact    Neurovascular status: Neurovascularly intact        Scribe Attestation      I,:  Roland Lau PA-C am acting as a scribe while in the presence of the attending  physician.:       I,:  Юлия Antonio personally performed the services described in this documentation    as scribed in my presence.:

## 2024-01-12 DIAGNOSIS — E78.2 MIXED HYPERLIPIDEMIA: ICD-10-CM

## 2024-01-12 DIAGNOSIS — K21.9 GASTRO-ESOPHAGEAL REFLUX DISEASE WITHOUT ESOPHAGITIS: ICD-10-CM

## 2024-01-12 DIAGNOSIS — E78.1 HYPERTRIGLYCERIDEMIA: ICD-10-CM

## 2024-01-12 RX ORDER — ROSUVASTATIN CALCIUM 5 MG/1
5 TABLET, COATED ORAL DAILY
Qty: 30 TABLET | Refills: 5 | Status: SHIPPED | OUTPATIENT
Start: 2024-01-12

## 2024-01-12 RX ORDER — OMEPRAZOLE 20 MG/1
CAPSULE, DELAYED RELEASE ORAL
Qty: 30 CAPSULE | Refills: 5 | Status: SHIPPED | OUTPATIENT
Start: 2024-01-12

## 2024-01-19 ENCOUNTER — TELEPHONE (OUTPATIENT)
Dept: OBGYN CLINIC | Facility: CLINIC | Age: 34
End: 2024-01-19

## 2024-01-19 NOTE — TELEPHONE ENCOUNTER
Called and spoke to patient, informed patient [facility] is requiring verbal consent to ship the device to the office. Provided patient with their phone number. Patient verbalized understanding, no questions or concerns.    HYDROcodone-acetaminophen (NORCO)  MG per tablet 40 tablet 0 3/19/2018     Sig - Route: Take 1 tablet by mouth every 6 hours as needed for Pain. Do not exceed 4,000mg of acetaminophen in a 24 hr period. - Oral    Class: Joanne      VERA [facility]ENN    Needs to  today as he is going to his sister in laws to be taken care of She will be there at 2pm Please call ASAP

## 2024-01-26 NOTE — TELEPHONE ENCOUNTER
Attempted to call, left a message requesting patient to call the office, office number provided in the office. Office has received the mirena, please schedule an insertion appointment.

## 2024-01-29 ENCOUNTER — OFFICE VISIT (OUTPATIENT)
Dept: FAMILY MEDICINE CLINIC | Facility: CLINIC | Age: 34
End: 2024-01-29
Payer: MEDICARE

## 2024-01-29 VITALS
OXYGEN SATURATION: 99 % | HEART RATE: 102 BPM | WEIGHT: 220 LBS | SYSTOLIC BLOOD PRESSURE: 140 MMHG | HEIGHT: 63 IN | BODY MASS INDEX: 38.98 KG/M2 | DIASTOLIC BLOOD PRESSURE: 80 MMHG

## 2024-01-29 DIAGNOSIS — R22.31 MASS OF RIGHT HAND: ICD-10-CM

## 2024-01-29 DIAGNOSIS — L72.9 SUBCUTANEOUS CYST: Primary | ICD-10-CM

## 2024-01-29 DIAGNOSIS — M67.40 GANGLION CYST: ICD-10-CM

## 2024-01-29 PROCEDURE — 99213 OFFICE O/P EST LOW 20 MIN: CPT | Performed by: FAMILY MEDICINE

## 2024-01-29 RX ORDER — LEVONORGESTREL 52 MG/1
INTRAUTERINE DEVICE INTRAUTERINE
COMMUNITY
Start: 2024-01-19

## 2024-01-29 NOTE — PROGRESS NOTES
Outpatient Note- Follow up     HPI:     Stella Sorenson , 33 y.o. genderqueer, neither exclusively male or female  presents today for charley horse and nodule of the right thumb.  The patient noticed a charley horse followed by an area of swelling on her right thumb.  This occurred several days ago.  She was concerned for the nodule was right next to a vessel and did not want to wait too long and have the area either invade or complicate anything with her thumb.  She is able to move her thumb normally at this time, and has no perfusion issues.  Her muscle strength has been maintained.  She denies any other systemic symptoms of fever, chills, nausea, vomiting, lightheadedness, dizziness, chest pain, shortness of breath.    Past Medical History:   Diagnosis Date    Anxiety     Asthma     Cervical cyst     Current moderate episode of major depressive disorder without prior episode (HCC)     Depression     Fatty liver     Hypertension     Hypertriglyceridemia     Mixed hyperlipidemia     Ovarian cyst     Ovarian cyst     left      ROS:   Review of Systems   See HPI    OBJECTIVE  Vitals:    01/29/24 1113   BP: 140/80   Pulse: 102   SpO2: 99%        Physical Exam  Constitutional:       General: Stella is not in acute distress.     Appearance: Normal appearance. Stella is not ill-appearing, toxic-appearing or diaphoretic.   Musculoskeletal:         General: Swelling present. No tenderness or signs of injury.      Comments: Nitin's test negative for abnormalities    Skin:     General: Skin is warm.      Capillary Refill: Capillary refill takes less than 2 seconds.   Neurological:      Mental Status: Stella is alert.            ASSESSMENT AND PLAN   Stella was seen today for lump on right hand .  Diagnoses and all orders for this visit:    Subcutaneous cyst  Ganglion cyst  Mass of right hand  Small nodular finding under subcutaneous tissue.  No pain, decreased ROM, vascular, or neurological findings.  Possibly from prior trauma  but know recalled.  Will have paitent continue to monitor.  If symptoms change in size, shape, or character consider ultrasound for further evaluation.           DO [facility]  1/29/2024 9:24 PM

## 2024-02-01 ENCOUNTER — OFFICE VISIT (OUTPATIENT)
Dept: OBGYN CLINIC | Facility: CLINIC | Age: 34
End: 2024-02-01

## 2024-02-01 ENCOUNTER — PROCEDURE VISIT (OUTPATIENT)
Dept: OBGYN CLINIC | Facility: CLINIC | Age: 34
End: 2024-02-01

## 2024-02-01 VITALS
DIASTOLIC BLOOD PRESSURE: 85 MMHG | HEART RATE: 108 BPM | RESPIRATION RATE: 18 BRPM | SYSTOLIC BLOOD PRESSURE: 128 MMHG | BODY MASS INDEX: 38.62 KG/M2 | HEIGHT: 63 IN | WEIGHT: 218 LBS

## 2024-02-01 VITALS
DIASTOLIC BLOOD PRESSURE: 107 MMHG | HEART RATE: 93 BPM | WEIGHT: 218.6 LBS | SYSTOLIC BLOOD PRESSURE: 146 MMHG | HEIGHT: 63 IN | BODY MASS INDEX: 38.73 KG/M2

## 2024-02-01 DIAGNOSIS — Z01.419 ENCOUNTER FOR ROUTINE GYNECOLOGICAL EXAMINATION WITH PAPANICOLAOU SMEAR OF CERVIX: ICD-10-CM

## 2024-02-01 DIAGNOSIS — S52.002A CLOSED FRACTURE OF PROXIMAL END OF LEFT ULNA, UNSPECIFIED FRACTURE MORPHOLOGY, INITIAL ENCOUNTER: Primary | ICD-10-CM

## 2024-02-01 DIAGNOSIS — Z30.430 ENCOUNTER FOR INSERTION OF INTRAUTERINE CONTRACEPTIVE DEVICE (IUD): Primary | ICD-10-CM

## 2024-02-01 DIAGNOSIS — N87.0 CIN I (CERVICAL INTRAEPITHELIAL NEOPLASIA I): ICD-10-CM

## 2024-02-01 LAB — SL AMB POCT URINE HCG: NEGATIVE

## 2024-02-01 PROCEDURE — 81025 URINE PREGNANCY TEST: CPT | Performed by: OBSTETRICS & GYNECOLOGY

## 2024-02-01 PROCEDURE — 58300 INSERT INTRAUTERINE DEVICE: CPT | Performed by: OBSTETRICS & GYNECOLOGY

## 2024-02-01 PROCEDURE — G0145 SCR C/V CYTO,THINLAYER,RESCR: HCPCS | Performed by: OBSTETRICS & GYNECOLOGY

## 2024-02-01 PROCEDURE — G0476 HPV COMBO ASSAY CA SCREEN: HCPCS | Performed by: OBSTETRICS & GYNECOLOGY

## 2024-02-01 PROCEDURE — 99024 POSTOP FOLLOW-UP VISIT: CPT | Performed by: ORTHOPAEDIC SURGERY

## 2024-02-01 NOTE — PROGRESS NOTES
[facility]TH[facility] [address] [id number] [phone]       Stella Sorenson  [id number]  1990    ORTHOPAEDIC SURGERY OUTPATIENT NOTE  2/1/2024      HISTORY:  33 y.o. adult  presents today follow up for eft anteromedial facet coronoid fracture minimally displaced. Patient states they are doing better. Denies any pain or discomfort. They have returned back to work and has no issues.     Past Medical History:   Diagnosis Date    Anxiety     Asthma     Cervical cyst     Current moderate episode of major depressive disorder without prior episode (HCC)     Depression     Fatty liver     Hypertension     Hypertriglyceridemia     Mixed hyperlipidemia     Ovarian cyst     Ovarian cyst     left       Past Surgical History:   Procedure Laterality Date    HI ESOPHAGOGASTRODUODENOSCOPY TRANSORAL DIAGNOSTIC N/A 4/16/2019    Procedure: ESOPHAGOGASTRODUODENOSCOPY (EGD);  Surgeon: Blake Irby MD;  Location: [facility];  Service: Gastroenterology    TOOTH EXTRACTION      WISDOM TOOTH EXTRACTION         Social History     Socioeconomic History    Marital status: Single     Spouse name: Not on file    Number of children: Not on file    Years of education: Not on file    Highest education level: Not on file   Occupational History    Not on file   Tobacco Use    Smoking status: Never    Smokeless tobacco: Never   Vaping Use    Vaping status: Never Used   Substance and Sexual Activity    Alcohol use: Yes    Drug use: No    Sexual activity: Yes     Partners: Male     Birth control/protection: None   Other Topics Concern    Not on file   Social History Narrative    Caffeine use     Social Determinants of Health     Financial Resource Strain: High Risk (11/2/2023)    Overall Financial Resource Strain (CARDIA)     Difficulty of Paying Living Expenses: Very hard   Food Insecurity: Food Insecurity Present (11/2/2023)    Hunger Vital Sign     Worried  About Running Out of Food in the Last Year: Often true     Ran Out of Food in the Last Year: Often true   Transportation Needs: Unmet Transportation Needs (11/2/2023)    PRAPARE - Transportation     Lack of Transportation (Medical): Yes     Lack of Transportation (Non-Medical): Yes   Physical Activity: Not on file   Stress: Not on file   Social Connections: Not on file   Intimate Partner Violence: Not on file   Housing Stability: Low Risk  (11/2/2023)    Housing Stability Vital Sign     Unable to Pay for Housing in the Last Year: No     Number of Places Lived in the Last Year: 1     Unstable Housing in the Last Year: No       Family History   Problem Relation Age of Onset    Hypertension Mother     Arthritis Father     Bipolar disorder Father     Heart disease Father         cardiac disorder    Hypertension Father     Hypertension Sister     No Known Problems Sister     Arthritis Family     Hypertension Family     Hypertension Family     Colon cancer Neg Hx     Ovarian cancer Neg Hx     Breast cancer Neg Hx     Uterine cancer Neg Hx         Patient's Medications   New Prescriptions    No medications on file   Previous Medications    ALBUTEROL (PROVENTIL HFA,VENTOLIN HFA) 90 MCG/ACT INHALER    Inhale 2 puffs every 6 (six) hours as needed for wheezing    AMPHETAMINE-DEXTROAMPHETAMINE (ADDERALL XR, 5MG,) 5 MG 24 HR CAPSULE    Take 1 capsule (5 mg total) by mouth every morning Max Daily Amount: 5 mg    LOSARTAN (COZAAR) 50 MG TABLET    Take 1 tablet (50 mg total) by mouth daily    MIRENA, 52 MG, 20 MCG/DAY IUD        OMEPRAZOLE (PRILOSEC) 20 MG DELAYED RELEASE CAPSULE    Take 1 capsule by mouth once daily    ONDANSETRON (ZOFRAN-ODT) 4 MG DISINTEGRATING TABLET    Take 1 tablet (4 mg total) by mouth every 6 (six) hours as needed for nausea or vomiting    QUETIAPINE (SEROQUEL) 50 MG TABLET    Take 1 tablet (50 mg total) by mouth daily at bedtime    ROSUVASTATIN (CRESTOR) 5 MG TABLET    Take 1 tablet by mouth once daily     "VENLAFAXINE (EFFEXOR-XR) 150 MG 24 HR CAPSULE    Take 1 capsule (150 mg total) by mouth daily    VENLAFAXINE (EFFEXOR-XR) 37.5 MG 24 HR CAPSULE    Take 1 capsule (37.5 mg total) by mouth daily   Modified Medications    No medications on file   Discontinued Medications    No medications on file       Allergies   Allergen Reactions    Tylenol [Acetaminophen] Anaphylaxis    Kiwi Extract - Food Allergy Itching    Pineapple Extract - Food Allergy Itching    Latex Rash        BP (!) 146/107 (BP Location: Right arm, Patient Position: Sitting, Cuff Size: Standard)   Pulse 93   Ht 5' 3\" (1.6 m)   Wt 99.2 kg (218 lb 9.6 oz)   LMP 01/05/2024 (Exact Date)   BMI 38.72 kg/m²      REVIEW OF SYSTEMS:  Constitutional: Negative.    HEENT: Negative.    Respiratory: Negative.    Skin: Negative.    Neurological: Negative.    Psychiatric/Behavioral: Negative.  Musculoskeletal: Negative except for that mentioned in the HPI.    PHYSICAL EXAM:   L elbow:  Flexion: 140 degrees  Extension: 0 degrees  Pronation: 80 degrees  Supination: 80 degrees    TTP Lateral Epicondyle: negative  TTP Medial Epicondyle: negative  TTP Olecranon: negative  TTP Radial Head: negative  TTP Biceps Tendon: negative    Strength:  Flexion: 5/5  Extension: 5/5  Pronation: 5/5  Supination: 5/5    Pain with resisted wrist extension: negative  Pain with resisted 3rd finger extension: negative  Pain with resisted wrist flexion: negative    Varus laxity: negative  Valgus laxity: negative  Milking maneuver: negative  Moving valgus stress test: negative    Cubital tunnel Tinel's: negative    Radial/median/ulnar nerve intact    <2 sec cap refill      IMAGING:  not reviewed today     ASSESSMENT AND PLAN:  33 y.o. adult  with left anteromedial facet coronoid fracture minimally displaced        Patient is doing better. OT order was given out today for stretching and range motion exercises to help get the last few degrees of extension. She is to follow up PRN.     Scribe " Attestation      I,:  Yoanna Jaffe am acting as a scribe while in the presence of the attending physician.:       I,:  Юлия Antonio, DO personally performed the services described in this documentation    as scribed in my presence.:

## 2024-02-01 NOTE — PROGRESS NOTES
Iud insertions    Date/Time: 2/1/2024 9:24 AM    Performed by: Silva Combs MD  Authorized by: Silva Combs MD    Other Assisting Provider: Yes (comment) (Dr. Huffman)    Verbal consent obtained?: Yes    Written consent obtained?: Yes    Risks and benefits: Risks, benefits and alternatives were discussed    Consent given by:  Patient  Patient states understanding of procedure being performed: Yes    Patient's understanding of procedure matches consent: Yes    Procedure consent matches procedure scheduled: Yes    Relevant documents present and verified: Yes    Required items: Required blood products, implants, devices and special equipment available    Patient identity confirmed:  Verbally with patient  Procedure:     Pelvic exam performed: yes      Negative urine pregnancy test: yes      Cervix cleaned and prepped: yes      Speculum placed in vagina: yes      Tenaculum applied to cervix: yes      Uterus sounded: yes      Uterus sound depth (cm):  9    IUD inserted with no complications: yes      IUD type:  Mirena    Strings trimmed: yes    Post-procedure:     Patient tolerated procedure well: yes      Patient will follow up after next period: yes    Comments:      Lot#[id number]  Exp 1/2026  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

## 2024-02-03 LAB
HPV HR 12 DNA CVX QL NAA+PROBE: NEGATIVE
HPV16 DNA CVX QL NAA+PROBE: NEGATIVE
HPV18 DNA CVX QL NAA+PROBE: POSITIVE

## 2024-02-06 ENCOUNTER — HOSPITAL ENCOUNTER (OUTPATIENT)
Dept: SLEEP CENTER | Facility: CLINIC | Age: 34
Discharge: HOME | End: 2024-02-06
Payer: MEDICARE

## 2024-02-06 DIAGNOSIS — G47.33 OSA (OBSTRUCTIVE SLEEP APNEA): ICD-10-CM

## 2024-02-06 PROCEDURE — 95810 POLYSOM 6/> YRS 4/> PARAM: CPT | Performed by: INTERNAL MEDICINE

## 2024-02-06 PROCEDURE — 95810 POLYSOM 6/> YRS 4/> PARAM: CPT

## 2024-02-07 PROBLEM — G47.33 OSA (OBSTRUCTIVE SLEEP APNEA): Status: ACTIVE | Noted: 2024-02-07

## 2024-02-07 LAB
LAB AP GYN PRIMARY INTERPRETATION: NORMAL
Lab: NORMAL

## 2024-02-07 NOTE — PROGRESS NOTES
Sleep Study Documentation    Pre-Sleep Study       Sleep testing procedure explained to patient:YES    Patient napped prior to study:NO    Caffeine:Dayshift worker after 12PM.  Caffeine use:YES- tea  6 to 18 ounces    Alcohol:Dayshift workers after 5PM: Alcohol use:NO    Typical day for patient:YES       Study Documentation    Sleep Study Indications: snoring, witnessed apnea, and excessive daytime sleepiness    Sleep Study: Diagnostic   Snore: soft to loud  Supplemental O2: no    Minimum SaO2 85  Baseline SaO2 94    EKG abnormalities: no     EEG abnormalities: no    Were abnormal behaviors in sleep observed:NO    Is Total Sleep Study Recording Time < 2 hours: N/A    Is Total Sleep Study Recording Time > 2 hours but study is incomplete: N/A    Is Total Sleep Study Recording Time 6 hours or more but sleep was not obtained: NO        Post-Sleep Study    Medication used at bedtime or during sleep study:YES prescription sleep aid    Patient reports time it took to fall asleep:20 to 30 minutes    Patient reports waking up during study:3 or more times.  Patient reports returning to sleep without difficulty.    Patient reports sleeping 4 to 6 hours with dreaming.    Does the Patient feel this is a typical night of sleep:typical    Patient rated sleepiness: Somewhat sleepy or tired    PAP treatment:no.

## 2024-02-11 DIAGNOSIS — G47.33 OSA (OBSTRUCTIVE SLEEP APNEA): Primary | ICD-10-CM

## 2024-02-14 ENCOUNTER — TELEPHONE (OUTPATIENT)
Dept: OBGYN CLINIC | Facility: CLINIC | Age: 34
End: 2024-02-14

## 2024-02-14 NOTE — TELEPHONE ENCOUNTER
----- Message from Silva Combs MD sent at 2/14/2024 10:37 AM EST -----  Please call patient to schedule colposcopy

## 2024-02-14 NOTE — TELEPHONE ENCOUNTER
Left message asking patient to call office to review results and schedule appointment. Office number provided in message.

## 2024-02-15 ENCOUNTER — TELEPHONE (OUTPATIENT)
Dept: OBGYN CLINIC | Facility: CLINIC | Age: 34
End: 2024-02-15

## 2024-02-15 NOTE — TELEPHONE ENCOUNTER
Attempt tor reach patient to review hpv results and schedule colposcopy.  Message left for patient to call office.

## 2024-02-19 ENCOUNTER — TELEPHONE (OUTPATIENT)
Dept: PSYCHIATRY | Facility: CLINIC | Age: 34
End: 2024-02-19

## 2024-02-19 NOTE — TELEPHONE ENCOUNTER
Called and spoke to patient, informed her of the test results and recommendations. Patient verbalized understanding, no questions or concerns. Colpo appointment has been scheduled.

## 2024-02-19 NOTE — TELEPHONE ENCOUNTER
Message    Appointment canceled for Stella Sorenson ([id number])   Visit Type: MEDICATION MANAGEMENT [facility]   Date        Time      Length    Provider                  Department   2/21/2024    3:00 PM  60 mins.  Kevyn Duran DO [facility]      Reason for Cancellation: Canceled via [email or web address]     Pt Schedule Request  (Newest Message First)  View All Conversations on this Encounter  Cee, Generic  [facility] Clerical3 days ago     GM  Appointment canceled for Stella Sorenson ([id number])  Visit Type: MEDICATION MANAGEMENT [facility]  Date        Time      Length    Provider                  Department  2/21/2024    3:00 PM  60 mins.  Kevyn Duran DO [facility]    Reason for Cancellation: Canceled via [email or web address]

## 2024-02-22 ENCOUNTER — PROCEDURE VISIT (OUTPATIENT)
Dept: OBGYN CLINIC | Facility: CLINIC | Age: 34
End: 2024-02-22

## 2024-02-22 VITALS
SYSTOLIC BLOOD PRESSURE: 139 MMHG | DIASTOLIC BLOOD PRESSURE: 80 MMHG | HEART RATE: 110 BPM | HEIGHT: 63 IN | BODY MASS INDEX: 38.09 KG/M2 | RESPIRATION RATE: 18 BRPM | WEIGHT: 215 LBS

## 2024-02-22 DIAGNOSIS — Z32.02 PREGNANCY TEST NEGATIVE: ICD-10-CM

## 2024-02-22 DIAGNOSIS — B97.7 HIGH RISK HPV INFECTION: Primary | ICD-10-CM

## 2024-02-22 LAB — SL AMB POCT URINE HCG: NORMAL

## 2024-02-22 PROCEDURE — 88305 TISSUE EXAM BY PATHOLOGIST: CPT | Performed by: PATHOLOGY

## 2024-02-22 PROCEDURE — 81025 URINE PREGNANCY TEST: CPT | Performed by: OBSTETRICS & GYNECOLOGY

## 2024-02-22 PROCEDURE — 88344 IMHCHEM/IMCYTCHM EA MLT ANTB: CPT | Performed by: PATHOLOGY

## 2024-02-22 PROCEDURE — 57456 ENDOCERV CURETTAGE W/SCOPE: CPT | Performed by: OBSTETRICS & GYNECOLOGY

## 2024-02-22 NOTE — PROGRESS NOTES
Colposcopy     Date/Time  2024 2:00 PM     [place] Protocol   Procedure performed by:  Consent: Verbal consent obtained. Written consent obtained.  Risks and benefits: risks, benefits and alternatives were discussed  Consent given by: patient  Patient understanding: patient states understanding of the procedure being performed  Patient consent: the patient's understanding of the procedure matches consent given  Procedure consent: procedure consent matches procedure scheduled  Relevant documents: relevant documents present and verified  Test results: test results available and properly labeled  Required items: required blood products, implants, devices, and special equipment available  Patient identity confirmed: verbally with patient     Performed by  Carine Love MD   Authorized by  Carine Love MD     Pre-procedure details      Prepped with: acetic acid     Indication    Indications: HPV 18.   Procedure Details   Procedure: Colposcopy w/ endocervical curettage      Under satisfactory analgesia the patient was prepped and draped in the dorsal lithotomy position: yes      [place] speculum was placed in the vagina: yes      Under colposcopic examination the transition zone was seen in entirety: yes      Endocervix was curetted using a Kevorkian curette: yes      Monsel's solution was applied: yes      Biopsy(s): yes      Location:  6, 10 and ECC    Specimen to pathology: yes     Post-procedure      Findings: White epithelium      Impression: Low grade cervical dysplasia      Patient tolerance of procedure:  Tolerated well, no immediate complications   Comments       COLPOSCOPY PROCEDURE NOTE    PROCEDURE PERFORMED BY: Carine Love MD     ATTENDING: Veda Huffman MD     INDICATION FOR PROCEDURE: Patient is a 34yo , using IUD for contraception. She was found to have pap with NILM, HPV 18 positive indicating further evaluation. Prior cervical treatment includes colposcopy- ARIAS 1.    UPT   negative     PROCEDURE DETAILS: colposcopy, biopsies, ECC    CONDITION: Stable. Patient tolerated procedure well.    COMPLICATIONS: None.    ADEQUATE EXAM: yes    BIOPSY SPECIMENS: 6, 10 and ECC    CYTOLOGY: NILM  HRV HPV: 18    FINDINGS: acetowhite changes at 6 and 10, IUD strings visualized     COLPOSCOPIC IMPRESSION: low grade     PLAN: f/u results    Carine Romo MD  OBGYN PGY-4  1:58 PM  2/22/2024

## 2024-02-24 DIAGNOSIS — F41.1 GENERALIZED ANXIETY DISORDER: ICD-10-CM

## 2024-02-24 DIAGNOSIS — F39 UNSPECIFIED MOOD (AFFECTIVE) DISORDER (HCC): ICD-10-CM

## 2024-02-24 DIAGNOSIS — I10 ESSENTIAL HYPERTENSION: ICD-10-CM

## 2024-02-26 RX ORDER — QUETIAPINE FUMARATE 50 MG/1
50 TABLET, FILM COATED ORAL
Qty: 30 TABLET | Refills: 0 | Status: SHIPPED | OUTPATIENT
Start: 2024-02-26

## 2024-02-26 RX ORDER — VENLAFAXINE HYDROCHLORIDE 37.5 MG/1
37.5 CAPSULE, EXTENDED RELEASE ORAL DAILY
Qty: 30 CAPSULE | Refills: 0 | Status: SHIPPED | OUTPATIENT
Start: 2024-02-26

## 2024-02-26 RX ORDER — VENLAFAXINE HYDROCHLORIDE 150 MG/1
150 CAPSULE, EXTENDED RELEASE ORAL DAILY
Qty: 30 CAPSULE | Refills: 0 | Status: SHIPPED | OUTPATIENT
Start: 2024-02-26

## 2024-02-26 RX ORDER — LOSARTAN POTASSIUM 50 MG/1
50 TABLET ORAL DAILY
Qty: 90 TABLET | Refills: 0 | Status: SHIPPED | OUTPATIENT
Start: 2024-02-26

## 2024-02-26 NOTE — TELEPHONE ENCOUNTER
Reviewed prior blood pressure.  50mg may be effective.  Order placed for 50mg again.  Nursing visit should be performed to confirm proper dosing. Nursing note sent.     DO Louisa Escalona [facility]  2/26/2024 10:14 AM

## 2024-02-27 DIAGNOSIS — Z00.6 ENCOUNTER FOR EXAMINATION FOR NORMAL COMPARISON OR CONTROL IN CLINICAL RESEARCH PROGRAM: ICD-10-CM

## 2024-02-28 PROCEDURE — 88344 IMHCHEM/IMCYTCHM EA MLT ANTB: CPT | Performed by: PATHOLOGY

## 2024-02-28 PROCEDURE — 88305 TISSUE EXAM BY PATHOLOGIST: CPT | Performed by: PATHOLOGY

## 2024-02-29 ENCOUNTER — OFFICE VISIT (OUTPATIENT)
Dept: OBGYN CLINIC | Facility: CLINIC | Age: 34
End: 2024-02-29

## 2024-02-29 ENCOUNTER — OFFICE VISIT (OUTPATIENT)
Dept: PSYCHIATRY | Facility: CLINIC | Age: 34
End: 2024-02-29

## 2024-02-29 VITALS
HEIGHT: 63 IN | DIASTOLIC BLOOD PRESSURE: 88 MMHG | RESPIRATION RATE: 18 BRPM | WEIGHT: 213 LBS | BODY MASS INDEX: 37.74 KG/M2 | SYSTOLIC BLOOD PRESSURE: 161 MMHG | HEART RATE: 108 BPM

## 2024-02-29 DIAGNOSIS — Z30.432 ENCOUNTER FOR IUD REMOVAL: Primary | ICD-10-CM

## 2024-02-29 PROCEDURE — 58301 REMOVE INTRAUTERINE DEVICE: CPT | Performed by: OBSTETRICS & GYNECOLOGY

## 2024-02-29 NOTE — PROGRESS NOTES
"OB/GYN VISIT  Stella Sorenson  2024  11:25 AM      Assessment/Plan:    Stella Sorenson is a 33 y.o.  adult presenting for IUD string check, found to have half of IUD out of cervical os into the vagina. IUD was removed today and patient wishes to have another one placed. We discussed the process to do so today and pregnancy precautions in the mean time. We also discussed their elevated BP at this visit. Pt has cHTN and did not take their medication this morning, pt encouraged to take medications daily and follow up with PCP to review any need to adjust medication.     Subjective:     Stella Sorenson is a 33 y.o.  adult who presents for evaluation of IUD strings s/p IUD placement. Reports they've been doing well with the IUD in place. Minimal bleeding. No cramping after the first week.     Objective:    Vitals: Blood pressure 161/88, pulse (!) 108, resp. rate 18, height 5' 3\" (1.6 m), weight 96.6 kg (213 lb), last menstrual period 2024.Body mass index is 37.73 kg/m².    Past Medical History:   Diagnosis Date    Anxiety     Asthma     Cervical cyst     Current moderate episode of major depressive disorder without prior episode (HCC)     Depression     Fatty liver     Hypertension     Hypertriglyceridemia     Mixed hyperlipidemia     Ovarian cyst     Ovarian cyst     left     Past Surgical History:   Procedure Laterality Date    [place] ESOPHAGOGASTRODUODENOSCOPY TRANSORAL DIAGNOSTIC N/A 2019    Procedure: ESOPHAGOGASTRODUODENOSCOPY (EGD);  Surgeon: Blake Irby MD;  Location: [facility];  Service: Gastroenterology    TOOTH EXTRACTION      WISDOM TOOTH EXTRACTION         Physical Exam  Constitutional:       Appearance: Normal appearance.   HENT:      Head: Normocephalic and atraumatic.   Eyes:      Extraocular Movements: Extraocular movements intact.   Pulmonary:      Effort: Pulmonary effort is normal.   Genitourinary:     Comments: Lower portion of IUD protruding through the cervical os into " the vagina, removed with ring forceps  Musculoskeletal:         General: Normal range of motion.   Skin:     General: Skin is warm and dry.   Neurological:      Mental Status: Stella is alert. Mental status is at baseline.   Psychiatric:         Mood and Affect: Mood normal.         Behavior: Behavior normal.           Lisa Costa MD  2/29/2024  11:25 AM

## 2024-02-29 NOTE — PSYCH
No Call. No Show. No Charge    Stella Sorenson no showed for Stella's appointment on 02/29/24. Staff will call the patient to reschedule.    Treatment Plan not completed within required time limits due to: Stella Sorenson no show appointment on 02/29/24

## 2024-03-04 ENCOUNTER — TELEPHONE (OUTPATIENT)
Dept: OBGYN CLINIC | Facility: CLINIC | Age: 34
End: 2024-03-04

## 2024-03-04 NOTE — TELEPHONE ENCOUNTER
----- Message from Carine Love MD sent at 3/4/2024  7:40 AM EST -----  Please let Stella know that her colpo came back negative.

## 2024-03-04 NOTE — TELEPHONE ENCOUNTER
LM for pt to access Mychart re: colpo results and call back [facility] with any questions/concerns.

## 2024-03-07 ENCOUNTER — TELEPHONE (OUTPATIENT)
Dept: SLEEP CENTER | Facility: CLINIC | Age: 34
End: 2024-03-07

## 2024-03-07 ENCOUNTER — APPOINTMENT (OUTPATIENT)
Age: 34
End: 2024-03-07
Payer: MEDICARE

## 2024-03-07 ENCOUNTER — HOSPITAL ENCOUNTER (EMERGENCY)
Facility: HOSPITAL | Age: 34
Discharge: HOME | End: 2024-03-07
Attending: EMERGENCY MEDICINE
Payer: MEDICARE

## 2024-03-07 ENCOUNTER — TELEPHONE (OUTPATIENT)
Dept: OBGYN CLINIC | Facility: CLINIC | Age: 34
End: 2024-03-07

## 2024-03-07 VITALS
OXYGEN SATURATION: 98 % | SYSTOLIC BLOOD PRESSURE: 168 MMHG | HEART RATE: 89 BPM | RESPIRATION RATE: 20 BRPM | TEMPERATURE: 97.5 F | DIASTOLIC BLOOD PRESSURE: 70 MMHG

## 2024-03-07 DIAGNOSIS — E78.2 MIXED HYPERLIPIDEMIA: ICD-10-CM

## 2024-03-07 DIAGNOSIS — E78.1 HYPERTRIGLYCERIDEMIA: ICD-10-CM

## 2024-03-07 DIAGNOSIS — Z00.6 ENCOUNTER FOR EXAMINATION FOR NORMAL COMPARISON OR CONTROL IN CLINICAL RESEARCH PROGRAM: ICD-10-CM

## 2024-03-07 DIAGNOSIS — R73.03 PREDIABETES: ICD-10-CM

## 2024-03-07 DIAGNOSIS — N93.8 DYSFUNCTIONAL UTERINE BLEEDING: Primary | ICD-10-CM

## 2024-03-07 LAB
ALBUMIN SERPL BCP-MCNC: 4.3 G/DL (ref 3.5–5)
ALP SERPL-CCNC: 39 U/L (ref 34–104)
ALT SERPL W P-5'-P-CCNC: 18 U/L (ref 7–52)
ANION GAP SERPL CALCULATED.3IONS-SCNC: 7 MMOL/L
APTT PPP: 35 SECONDS (ref 23–37)
AST SERPL W P-5'-P-CCNC: 13 U/L (ref 5–45)
BASOPHILS # BLD AUTO: 0.02 THOUSANDS/ÂΜL (ref 0–0.1)
BASOPHILS NFR BLD AUTO: 0 % (ref 0–1)
BILIRUB SERPL-MCNC: 0.31 MG/DL (ref 0.2–1)
BUN SERPL-MCNC: 13 MG/DL (ref 5–25)
CALCIUM SERPL-MCNC: 8.8 MG/DL (ref 8.4–10.2)
CHLORIDE SERPL-SCNC: 106 MMOL/L (ref 96–108)
CHOLEST SERPL-MCNC: 119 MG/DL
CO2 SERPL-SCNC: 26 MMOL/L (ref 21–32)
CREAT SERPL-MCNC: 0.71 MG/DL (ref 0.6–1.3)
EOSINOPHIL # BLD AUTO: 0.21 THOUSAND/ÂΜL (ref 0–0.61)
EOSINOPHIL NFR BLD AUTO: 3 % (ref 0–6)
ERYTHROCYTE [DISTWIDTH] IN BLOOD BY AUTOMATED COUNT: 12.9 % (ref 11.6–15.1)
EST. AVERAGE GLUCOSE BLD GHB EST-MCNC: 114 MG/DL
GLUCOSE SERPL-MCNC: 103 MG/DL (ref 65–140)
HBA1C MFR BLD: 5.6 %
HCG SERPL QL: NEGATIVE
HCT VFR BLD AUTO: 34.4 % (ref 36.5–46.1)
HDLC SERPL-MCNC: 39 MG/DL
HGB BLD-MCNC: 11.4 G/DL (ref 12–15.4)
IMM GRANULOCYTES # BLD AUTO: 0.01 THOUSAND/UL (ref 0–0.2)
IMM GRANULOCYTES NFR BLD AUTO: 0 % (ref 0–2)
INR PPP: 1.09 (ref 0.84–1.19)
LDLC SERPL CALC-MCNC: 53 MG/DL (ref 0–100)
LYMPHOCYTES # BLD AUTO: 2.18 THOUSANDS/ÂΜL (ref 0.6–4.47)
LYMPHOCYTES NFR BLD AUTO: 34 % (ref 14–44)
MCH RBC QN AUTO: 29.4 PG (ref 26.8–34.3)
MCHC RBC AUTO-ENTMCNC: 33.1 G/DL (ref 31.4–37.4)
MCV RBC AUTO: 89 FL (ref 82–98)
MONOCYTES # BLD AUTO: 0.31 THOUSAND/ÂΜL (ref 0.17–1.22)
MONOCYTES NFR BLD AUTO: 5 % (ref 4–12)
NEUTROPHILS # BLD AUTO: 3.76 THOUSANDS/ÂΜL (ref 1.85–7.62)
NEUTS SEG NFR BLD AUTO: 58 % (ref 43–75)
NONHDLC SERPL-MCNC: 80 MG/DL
NRBC BLD AUTO-RTO: 0 /100 WBCS
PLATELET # BLD AUTO: 341 THOUSANDS/UL (ref 149–390)
PMV BLD AUTO: 10.4 FL (ref 8.9–12.7)
POTASSIUM SERPL-SCNC: 3.6 MMOL/L (ref 3.5–5.3)
PROT SERPL-MCNC: 7.1 G/DL (ref 6.4–8.4)
PROTHROMBIN TIME: 14.7 SECONDS (ref 11.6–14.5)
RBC # BLD AUTO: 3.88 MILLION/UL (ref 3.88–5.12)
SODIUM SERPL-SCNC: 139 MMOL/L (ref 135–147)
TRIGL SERPL-MCNC: 134 MG/DL
TSH SERPL DL<=0.05 MIU/L-ACNC: 2.64 UIU/ML (ref 0.45–4.5)
WBC # BLD AUTO: 6.49 THOUSAND/UL (ref 4.31–10.16)

## 2024-03-07 PROCEDURE — 84443 ASSAY THYROID STIM HORMONE: CPT | Performed by: EMERGENCY MEDICINE

## 2024-03-07 PROCEDURE — 99284 EMERGENCY DEPT VISIT MOD MDM: CPT | Performed by: EMERGENCY MEDICINE

## 2024-03-07 PROCEDURE — 83036 HEMOGLOBIN GLYCOSYLATED A1C: CPT

## 2024-03-07 PROCEDURE — 85610 PROTHROMBIN TIME: CPT | Performed by: EMERGENCY MEDICINE

## 2024-03-07 PROCEDURE — 80053 COMPREHEN METABOLIC PANEL: CPT | Performed by: EMERGENCY MEDICINE

## 2024-03-07 PROCEDURE — 85025 COMPLETE CBC W/AUTO DIFF WBC: CPT | Performed by: EMERGENCY MEDICINE

## 2024-03-07 PROCEDURE — 99284 EMERGENCY DEPT VISIT MOD MDM: CPT

## 2024-03-07 PROCEDURE — 85730 THROMBOPLASTIN TIME PARTIAL: CPT | Performed by: EMERGENCY MEDICINE

## 2024-03-07 PROCEDURE — 84703 CHORIONIC GONADOTROPIN ASSAY: CPT | Performed by: EMERGENCY MEDICINE

## 2024-03-07 PROCEDURE — 80061 LIPID PANEL: CPT

## 2024-03-07 PROCEDURE — 36415 COLL VENOUS BLD VENIPUNCTURE: CPT

## 2024-03-07 PROCEDURE — 96374 THER/PROPH/DIAG INJ IV PUSH: CPT

## 2024-03-07 PROCEDURE — 96361 HYDRATE IV INFUSION ADD-ON: CPT

## 2024-03-07 RX ORDER — NAPROXEN 500 MG/1
500 TABLET ORAL EVERY 12 HOURS PRN
Qty: 20 TABLET | Refills: 0 | Status: SHIPPED | OUTPATIENT
Start: 2024-03-07

## 2024-03-07 RX ORDER — KETOROLAC TROMETHAMINE 30 MG/ML
15 INJECTION, SOLUTION INTRAMUSCULAR; INTRAVENOUS ONCE
Status: COMPLETED | OUTPATIENT
Start: 2024-03-07 | End: 2024-03-07

## 2024-03-07 RX ADMIN — SODIUM CHLORIDE 1000 ML: 0.9 INJECTION, SOLUTION INTRAVENOUS at 16:03

## 2024-03-07 RX ADMIN — KETOROLAC TROMETHAMINE 15 MG: 30 INJECTION, SOLUTION INTRAMUSCULAR; INTRAVENOUS at 16:53

## 2024-03-07 NOTE — TELEPHONE ENCOUNTER
Pt called office stating she is experiencing heavy bleeding with large clots. She is sometimes bleeding through a pad in an hour or less. Advised pt to visit ED for further evaluation.

## 2024-03-07 NOTE — TELEPHONE ENCOUNTER
Diagnostic sleep study resulted, shows mild ALEJANDRA with desaturations with respiratory events.  APAP ordered.    Call placed to patient, left call back message.    Patient needs DME set up.    Follow up appointment with Dr. Farias 9/5/2024 needs to be re-scheduled to meet compliance.

## 2024-03-07 NOTE — ED PROVIDER NOTES
History  Chief Complaint   Patient presents with    Vaginal Bleeding     Patient reports vaginal bleeding with heavy bleeding and large clots.  Patient had a mirena- was removed last week.     Patient is a 33-year-old female with past medical history of hypertension, hyperlipidemia, GERD, anxiety, depression, presents to the emergency department for heavy vaginal bleeding that started yesterday.  Patient states about 1 week ago, she had her Mirena IUD removed because it was partially coming out of her cervix so her OB/GYN removed it.  It had been in for about 1 month prior to removal.  She states she just started her menstrual cycle yesterday however it is much heavier than normal and she is passing large blood clots.  She states one of the clots was very very large which caused her alarm and prompted her to come to the ED.  She reports feeling lightheaded.  She states she does not normally get menstrual cramps but does feel slight crampiness in her lower abdomen today.  She denies any fevers or chills, headache, dizziness, syncope, chest pain, shortness of breath, palpitations, abdominal pain or distention, nausea, vomiting, change in bowel habits, dysuria, change in frequency, gross hematuria, flank pain, vaginal foul odor, skin rash or color change, leg pain or swelling, extremity weakness or paresthesia or other focal neurologic deficits.  Patient states she does plan to go back on birth control and is going to follow with her OB/GYN for this.        History provided by:  Patient   used: No    Vaginal Bleeding  Associated symptoms: no abdominal pain, no back pain, no dizziness, no dysuria, no fever, no nausea and no vaginal discharge        Prior to Admission Medications   Prescriptions Last Dose Informant Patient Reported? Taking?   Mirena, 52 MG, 20 MCG/DAY IUD  Self Yes No   QUEtiapine (SEROquel) 50 mg tablet  Self No No   Sig: TAKE 1 TABLET BY MOUTH ONCE DAILY AT BEDTIME   albuterol  (PROVENTIL HFA,VENTOLIN HFA) 90 mcg/act inhaler  Self Yes No   Sig: Inhale 2 puffs every 6 (six) hours as needed for wheezing   amphetamine-dextroamphetamine (ADDERALL XR, 5MG,) 5 MG 24 hr capsule   No No   Sig: Take 1 capsule (5 mg total) by mouth every morning Max Daily Amount: 5 mg   losartan (COZAAR) 50 mg tablet  Self No No   Sig: Take 1 tablet by mouth once daily   omeprazole (PriLOSEC) 20 mg delayed release capsule  Self No No   Sig: Take 1 capsule by mouth once daily   ondansetron (ZOFRAN-ODT) 4 mg disintegrating tablet  Self No No   Sig: Take 1 tablet (4 mg total) by mouth every 6 (six) hours as needed for nausea or vomiting   rosuvastatin (CRESTOR) 5 mg tablet  Self No No   Sig: Take 1 tablet by mouth once daily   venlafaxine (EFFEXOR-XR) 150 mg 24 hr capsule  Self No No   Sig: Take 1 capsule by mouth once daily   venlafaxine (EFFEXOR-XR) 37.5 mg 24 hr capsule  Self No No   Sig: Take 1 capsule by mouth once daily      Facility-Administered Medications: None       Past Medical History:   Diagnosis Date    Anxiety     Asthma     Cervical cyst     Current moderate episode of major depressive disorder without prior episode (HCC)     Depression     Fatty liver     Hypertension     Hypertriglyceridemia     Mixed hyperlipidemia     Ovarian cyst     Ovarian cyst     left       Past Surgical History:   Procedure Laterality Date    CO ESOPHAGOGASTRODUODENOSCOPY TRANSORAL DIAGNOSTIC N/A 4/16/2019    Procedure: ESOPHAGOGASTRODUODENOSCOPY (EGD);  Surgeon: Blake Irby MD;  Location: [facility];  Service: Gastroenterology    TOOTH EXTRACTION      WISDOM TOOTH EXTRACTION         Family History   Problem Relation Age of Onset    Hypertension Mother     Arthritis Father     Bipolar disorder Father     Heart disease Father         cardiac disorder    Hypertension Father     Hypertension Sister     No Known Problems Sister     Arthritis Family     Hypertension Family     Hypertension Family     Colon cancer Neg Hx      Ovarian cancer Neg Hx     Breast cancer Neg Hx     Uterine cancer Neg Hx      I have reviewed and agree with the history as documented.    E-Cigarette/Vaping    E-Cigarette Use Never User      E-Cigarette/Vaping Substances    Nicotine No     THC No     CBD No     Flavoring No     Other No     Unknown No      Social History     Tobacco Use    Smoking status: Never    Smokeless tobacco: Never   Vaping Use    Vaping status: Never Used   Substance Use Topics    Alcohol use: Yes    Drug use: No       Review of Systems   Constitutional:  Negative for chills and fever.   HENT:  Negative for congestion, ear pain, rhinorrhea and sore throat.    Respiratory:  Negative for cough and shortness of breath.    Cardiovascular:  Negative for chest pain and palpitations.   Gastrointestinal:  Negative for abdominal pain, constipation, diarrhea, nausea and vomiting.   Genitourinary:  Positive for pelvic pain and vaginal bleeding. Negative for dysuria, flank pain, frequency, hematuria and vaginal discharge.   Musculoskeletal:  Negative for back pain and neck pain.   Skin:  Negative for color change, pallor, rash and wound.   Allergic/Immunologic: Negative for immunocompromised state.   Neurological:  Negative for dizziness, syncope, weakness, light-headedness, numbness and headaches.   Hematological:  Negative for adenopathy. Does not bruise/bleed easily.   Psychiatric/Behavioral:  Negative for confusion and decreased concentration.    All other systems reviewed and are negative.      Physical Exam  Physical Exam  Vitals and nursing note reviewed.   Constitutional:       General: Stella is not in acute distress.     Appearance: Normal appearance. Stella is well-developed. Stella is obese. Stella is not ill-appearing, toxic-appearing or diaphoretic.   HENT:      Head: Normocephalic and atraumatic.      Right Ear: External ear normal.      Left Ear: External ear normal.      Nose: Nose normal.      Mouth/Throat:      Mouth: Mucous membranes  are moist.      Pharynx: Oropharynx is clear.   Eyes:      Extraocular Movements: Extraocular movements intact.      Conjunctiva/sclera: Conjunctivae normal.   Neck:      Vascular: No JVD.   Cardiovascular:      Rate and Rhythm: Normal rate and regular rhythm.      Pulses: Normal pulses.      Heart sounds: Normal heart sounds. No murmur heard.     No friction rub. No gallop.   Pulmonary:      Effort: Pulmonary effort is normal. No respiratory distress.      Breath sounds: Normal breath sounds. No wheezing, rhonchi or rales.   Abdominal:      General: There is no distension.      Palpations: Abdomen is soft.      Tenderness: There is no abdominal tenderness. There is no guarding or rebound.   Genitourinary:     Comments: Patient declined  exam.  Musculoskeletal:         General: No swelling or tenderness. Normal range of motion.      Cervical back: Normal range of motion and neck supple. No rigidity.   Skin:     General: Skin is warm and dry.      Coloration: Skin is not pale.      Findings: No erythema or rash.   Neurological:      General: No focal deficit present.      Mental Status: Stella is alert and oriented to person, place, and time.      Sensory: No sensory deficit.      Motor: No weakness.   Psychiatric:         Mood and Affect: Mood normal.         Behavior: Behavior normal.         Vital Signs  ED Triage Vitals [03/07/24 1421]   Temperature Pulse Respirations Blood Pressure SpO2   97.5 °F (36.4 °C) 99 18 170/93 98 %      Temp Source Heart Rate Source Patient Position - Orthostatic VS BP Location FiO2 (%)   Temporal Monitor Sitting Left arm --      Pain Score       --         Vitals:    03/07/24 1421 03/07/24 1657   BP: 170/93 168/70   BP Location: Left arm    Pulse: 99 89   Resp: 18 20   Temp: 97.5 °F (36.4 °C)    TempSrc: Temporal    SpO2: 98% 98%          Visual Acuity      ED Medications  Medications   sodium chloride 0.9 % bolus 1,000 mL (1,000 mL Intravenous New Bag 3/7/24 1603)   ketorolac  (TORADOL) injection 15 mg (15 mg Intravenous Given 3/7/24 1653)       Diagnostic Studies  Results Reviewed       Procedure Component Value Units Date/Time    TSH, 3rd generation with Free T4 reflex [[id number]]  (Normal) Collected: 03/07/24 1549    Lab Status: Final result Specimen: Blood from Arm, Right Updated: 03/07/24 1630     TSH 3RD GENERATON 2.644 uIU/mL     hCG, qualitative pregnancy [[id number]]  (Normal) Collected: 03/07/24 1549    Lab Status: Final result Specimen: Blood from Arm, Right Updated: 03/07/24 1630     Preg, Serum Negative    Comprehensive metabolic panel [[id number]] Collected: 03/07/24 1549    Lab Status: Final result Specimen: Blood from Arm, Right Updated: 03/07/24 1614     Sodium 139 mmol/L      Potassium 3.6 mmol/L      Chloride 106 mmol/L      CO2 26 mmol/L      ANION GAP 7 mmol/L      BUN 13 mg/dL      Creatinine 0.71 mg/dL      Glucose 103 mg/dL      Calcium 8.8 mg/dL      AST 13 U/L      ALT 18 U/L      Alkaline Phosphatase 39 U/L      Total Protein 7.1 g/dL      Albumin 4.3 g/dL      Total Bilirubin 0.31 mg/dL      eGFR --    Narrative:      Notes:     1. eGFR calculation is only valid for adults 18 years and older.  2. EGFR calculation cannot be performed for patients who are transgender, non-binary, or whose legal sex, sex at birth, and gender identity differ.    Protime-INR [[id number]]  (Abnormal) Collected: 03/07/24 1549    Lab Status: Final result Specimen: Blood from Arm, Right Updated: 03/07/24 1609     Protime 14.7 seconds      INR 1.09    APTT [[id number]]  (Normal) Collected: 03/07/24 1549    Lab Status: Final result Specimen: Blood from Arm, Right Updated: 03/07/24 1609     PTT 35 seconds     CBC and differential [[id number]]  (Abnormal) Collected: 03/07/24 1549    Lab Status: Final result Specimen: Blood from Arm, Right Updated: 03/07/24 1557     WBC 6.49 Thousand/uL      RBC 3.88 Million/uL      Hemoglobin 11.4 g/dL      Hematocrit 34.4 %      MCV 89 fL      MCH 29.4 pg       MCHC 33.1 g/dL      RDW 12.9 %      MPV 10.4 fL      Platelets 341 Thousands/uL      nRBC 0 /100 WBCs      Neutrophils Relative 58 %      Immat GRANS % 0 %      Lymphocytes Relative 34 %      Monocytes Relative 5 %      Eosinophils Relative 3 %      Basophils Relative 0 %      Neutrophils Absolute 3.76 Thousands/µL      Immature Grans Absolute 0.01 Thousand/uL      Lymphocytes Absolute 2.18 Thousands/µL      Monocytes Absolute 0.31 Thousand/µL      Eosinophils Absolute 0.21 Thousand/µL      Basophils Absolute 0.02 Thousands/µL                    No orders to display              Procedures  Procedures         ED Course  ED Course as of 03/07/24 1657   Thu Mar 07, 2024   1604 Hemoglobin(!): 11.4   1604 Hemoglobin 2 months ago was 12.8.  Hemoglobin today not significantly low so I feel course of NSAIDs and close outpatient OB/GYN follow-up is appropriate.    1608 Tiger texted on-call ob/gyn, Dr. Reid,    1626 Dr. Reid deferred to patient's resident clinic ob/gyn, Bonnie Costa, who recommended discharge with NSAIDs.  They are waiting for patient's new IUD to arrive.  Will follow-up patient as outpatient.   1641 PREGNANCY, SERUM: Negative   1656 Updated patient about work up and recommendations from ob/gyn. Patient stable for discharge at this time. ED return parameters including signs/symptoms of worsening anemia, discussed prior to discharge.                                              Medical Decision Making  33-year-old female presents to the ED for heavy vaginal bleeding and passage of large blood clots that started yesterday.  Patient did have her Mirena IUD removed 1 week ago and I suspect the abnormal vaginal bleeding is likely related to hormone imbalance.  Will check basic labs including pregnancy test, CBC for hemoglobin, TSH, coags, CMP.  Will provide IV fluid bolus, Toradol for menstrual cramping.  Will have patient follow-up closely with her OB/GYN.  Discussed signs and symptoms of worsening  anemia and advised return immediately if the symptoms develop.    Amount and/or Complexity of Data Reviewed  Labs: ordered. Decision-making details documented in ED Course.    Risk  Prescription drug management.             Disposition  Final diagnoses:   Dysfunctional uterine bleeding     Time reflects when diagnosis was documented in both MDM as applicable and the Disposition within this note       Time User Action Codes Description Comment    3/7/2024  4:55 PM Celine Bradley Add [N93.8] DUB (dysfunctional uterine bleeding)     3/7/2024  4:55 PM Celine Bradley Remove [N93.8] DUB (dysfunctional uterine bleeding)     3/7/2024  4:55 PM Celine Bradley Add [N93.8] Dysfunctional uterine bleeding           ED Disposition       ED Disposition   Discharge    Condition   Stable    Date/Time   Thu Mar 7, 2024  4:55 PM    Comment   Stella Sorenson discharge to home/self care.                   Follow-up Information       Follow up With Specialties Details Why Contact Info Additional Information    Ob/Gyn  Schedule an appointment as soon as possible for a visit        [facility] Emergency Department Emergency Medicine Go to  If symptoms worsen [address] [place] [id number] [phone] [facility] Emergency Department, [address] [place] [address], [phone]            Patient's Medications   Discharge Prescriptions    NAPROXEN (NAPROSYN) 500 MG TABLET    Take 1 tablet (500 mg total) by mouth every 12 (twelve) hours as needed for mild pain or moderate pain       Start Date: 3/7/2024  End Date: --       Order Dose: 500 mg       Quantity: 20 tablet    Refills: 0       No discharge procedures on file.    PDMP Review         Value Time User    PDMP Reviewed  Yes 12/25/2023  2:50 AM Jose Alfredo Lees MD            ED Provider  Electronically Signed by             Celine Bradley DO  03/07/24 8105

## 2024-03-08 ENCOUNTER — OFFICE VISIT (OUTPATIENT)
Dept: FAMILY MEDICINE CLINIC | Facility: CLINIC | Age: 34
End: 2024-03-08
Payer: MEDICARE

## 2024-03-08 VITALS
BODY MASS INDEX: 38.98 KG/M2 | HEIGHT: 63 IN | SYSTOLIC BLOOD PRESSURE: 122 MMHG | OXYGEN SATURATION: 99 % | WEIGHT: 220 LBS | HEART RATE: 92 BPM | DIASTOLIC BLOOD PRESSURE: 88 MMHG

## 2024-03-08 DIAGNOSIS — M67.40 GANGLION CYST: ICD-10-CM

## 2024-03-08 DIAGNOSIS — L72.9 SUBCUTANEOUS CYST: ICD-10-CM

## 2024-03-08 DIAGNOSIS — E78.1 HYPERTRIGLYCERIDEMIA: ICD-10-CM

## 2024-03-08 DIAGNOSIS — N93.9 ABNORMAL VAGINAL BLEEDING: ICD-10-CM

## 2024-03-08 DIAGNOSIS — R22.31 MASS OF RIGHT HAND: ICD-10-CM

## 2024-03-08 DIAGNOSIS — I10 ESSENTIAL HYPERTENSION: Primary | ICD-10-CM

## 2024-03-08 DIAGNOSIS — E78.2 MIXED HYPERLIPIDEMIA: ICD-10-CM

## 2024-03-08 DIAGNOSIS — R73.03 PREDIABETES: ICD-10-CM

## 2024-03-08 PROCEDURE — 99214 OFFICE O/P EST MOD 30 MIN: CPT | Performed by: FAMILY MEDICINE

## 2024-03-08 NOTE — PROGRESS NOTES
Outpatient Note- Follow up     HPI:     Stella Sorenson , 33 y.o. genderqueer, neither exclusively male or female  presents today for follow-up of chronic conditions.  She was present in the emergency room yesterday after having an IUD removed about a week prior and having significant bleeding.  They did work her up and she was found to have a slightly decreased hemoglobin.  This is to be expected with the amount of blood that she was having.  They are waiting to replace her IUD currently through OB/GYN.  She is feeling better today and we reviewed that NSAIDs can increase the risk of bleeding.    Prior to going to the emergency room she obtained labs that followed up for her elevated fasting sugar and prediabetic status with A1c at 5.8.  On repeat it is at 5.6.  She has made changes to her diet and exercise.  She also followed up with a nutritionist and is using my fitness pal [employer]ell.  Additionally she has been taking the Crestor 5 mg daily.  The cholesterol values for total cholesterol, triglyceride, LDL, non-HDL have all significantly improved.  She has not had any significant symptoms on the medication.  Additionally she had elevated blood pressures into the 160s over 80s.  After starting Cozaar 50 mg daily and taking her off of labetalol this has significantly improved her symptoms.  The only reason we have been able to do this is she has been using birth control previously and is not actively trying to become pregnant.  This was the reason for labetalol in the past.  She has been stable on omeprazole for her reflux.  The rest of her medications are covered by her psychiatrist.    Fever, chills, nausea, vomiting, chest pain, shortness of breath, diarrhea, constipation.=      Past Medical History:   Diagnosis Date    Anxiety     Asthma     Cervical cyst     Current moderate episode of major depressive disorder without prior episode (HCC)     Depression     Fatty liver     Hypertension     Hypertriglyceridemia      Mixed hyperlipidemia     Ovarian cyst     Ovarian cyst     left      ROS:   Review of Systems   See HPI    OBJECTIVE  Vitals:    03/08/24 1059   BP: 122/88   Pulse: 92   SpO2: 99%        Physical Exam  Constitutional:       General: Stella is not in acute distress.     Appearance: Normal appearance. Stella is not ill-appearing, toxic-appearing or diaphoretic.   HENT:      Head: Normocephalic and atraumatic.      Right Ear: Tympanic membrane, ear canal and external ear normal. There is no impacted cerumen.      Left Ear: Tympanic membrane, ear canal and external ear normal. There is no impacted cerumen.      Nose: Nose normal. No congestion or rhinorrhea.      Mouth/Throat:      Mouth: Mucous membranes are moist.      Pharynx: Oropharynx is clear. No oropharyngeal exudate or posterior oropharyngeal erythema.   Eyes:      General:         Right eye: No discharge.         Left eye: No discharge.      Pupils: Pupils are equal, round, and reactive to light.   Cardiovascular:      Rate and Rhythm: Normal rate and regular rhythm.      Heart sounds: Normal heart sounds. No murmur heard.     No friction rub. No gallop.   Pulmonary:      Effort: Pulmonary effort is normal. No respiratory distress.      Breath sounds: Normal breath sounds. No stridor. No wheezing, rhonchi or rales.   Abdominal:      General: Bowel sounds are normal. There is no distension.      Palpations: Abdomen is soft.      Tenderness: There is no abdominal tenderness.   Musculoskeletal:      Cervical back: Neck supple. No tenderness.   Lymphadenopathy:      Cervical: No cervical adenopathy.   Skin:     General: Skin is warm.      Capillary Refill: Capillary refill takes less than 2 seconds.   Neurological:      Mental Status: Stella is alert.        ASSESSMENT AND PLAN   Stella was seen today for follow-up.    Diagnoses and all orders for this visit:    Essential hypertension  Stable.  Patient was switched from labetalol to losartan, after titrating to 50 mg  of losartan the patient's blood pressure is significantly better controlled.  Previously on labetalol secondary to possible pregnancy, but patient is looking to adopt and is no longer actively attempting to become pregnant.  Up until recently she was using birth control.  Will continue with current dosing of losartan since her most recent BP was 122/88 on presentation.  She is to monitor her symptoms and inform me of any increased lightheadedness and dizziness.    Ganglion cyst  Mass of right hand  Subcutaneous cyst  Patient continues to have what is likely a ganglion cyst on her wrist.  Previously evaluated, she was not interested in surgical intervention.  On reexamination today it is about the same size and is intermittently causing symptoms.  Offered surgical evaluation through hand surgery, patient deferred and will continue to monitor.  If it starts to interfere with daily life she will call the office to request referral.    Mixed hyperlipidemia  Hypertriglyceridemia  Significant change in cholesterol values.  Patient was started on Crestor 5 mg daily.  She has been doing exceptionally well and all of her cholesterol values are now within normal limits except for HDL which is slightly low.  We reviewed consuming more nuts, legumes, or plant-based oils to help with this value.  Otherwise no further intervention required, will continue on Crestor 5 mg daily.    Prediabetes  Resolved.  Patient has been monitoring oral intake and carbohydrates.  In November her diet increased to 5.8 and placed her in the prediabetic range.  Follow-up her A1c is 5.6.  Advocated that she continue to monitor her carbohydrate intake and reduce her weight.  She has been doing exceptionally well over the course of the last several months and may consider follow-up in 3 to 6 months.    Abnormal vaginal bleeding  Reviewed ED visit notes and labs.  Bleeding has been slowing.  Patient did have low hemoglobin due to excessive bleeding.  She  will continue to monitor symptoms and return to OB/GYN if this reoccurs.           DO [facility]  3/10/2024 6:22 AM

## 2024-03-16 ENCOUNTER — TELEPHONE (OUTPATIENT)
Dept: OTHER | Facility: OTHER | Age: 34
End: 2024-03-16

## 2024-03-16 DIAGNOSIS — F41.1 GENERALIZED ANXIETY DISORDER: ICD-10-CM

## 2024-03-16 DIAGNOSIS — F39 UNSPECIFIED MOOD (AFFECTIVE) DISORDER (HCC): ICD-10-CM

## 2024-03-16 NOTE — TELEPHONE ENCOUNTER
Pt would like to schedule and states she is almost out of Venlafaxine 150mg. Please call to schedule, will send the refill req as well.

## 2024-03-16 NOTE — TELEPHONE ENCOUNTER
Medication Refill Request     Name Effexor 150mg   Dose/Frequency 1x daily  Quantity 30  Verified pharmacy   [x]  Verified ordering Provider   [x]  Does patient have enough for the next 3 days? Yes [] No [x]

## 2024-03-18 RX ORDER — VENLAFAXINE HYDROCHLORIDE 150 MG/1
150 CAPSULE, EXTENDED RELEASE ORAL DAILY
Qty: 45 CAPSULE | Refills: 0 | Status: SHIPPED | OUTPATIENT
Start: 2024-03-18 | End: 2024-05-02

## 2024-03-18 RX ORDER — VENLAFAXINE HYDROCHLORIDE 37.5 MG/1
37.5 CAPSULE, EXTENDED RELEASE ORAL DAILY
Qty: 45 CAPSULE | Refills: 0 | Status: SHIPPED | OUTPATIENT
Start: 2024-03-18 | End: 2024-05-02

## 2024-03-18 NOTE — TELEPHONE ENCOUNTER
Due to policy and letters sent out on 10/6 and 3/6 with no contact with our office on the part of this patient, patient will be discharged.     45 day supply of medications given, Patient PCP will be notified.

## 2024-03-20 ENCOUNTER — DOCUMENTATION (OUTPATIENT)
Dept: PSYCHIATRY | Facility: CLINIC | Age: 34
End: 2024-03-20

## 2024-03-20 ENCOUNTER — TELEPHONE (OUTPATIENT)
Dept: PSYCHIATRY | Facility: CLINIC | Age: 34
End: 2024-03-20

## 2024-03-20 DIAGNOSIS — F90.2 ATTENTION DEFICIT HYPERACTIVITY DISORDER (ADHD), COMBINED TYPE: ICD-10-CM

## 2024-03-20 DIAGNOSIS — F41.1 GENERALIZED ANXIETY DISORDER: ICD-10-CM

## 2024-03-20 DIAGNOSIS — G47.00 INSOMNIA, UNSPECIFIED TYPE: ICD-10-CM

## 2024-03-20 DIAGNOSIS — F31.81 BIPOLAR II DISORDER (HCC): Primary | ICD-10-CM

## 2024-03-20 NOTE — TELEPHONE ENCOUNTER
DISCHARGE LETTER for Roger (certified and regular) placed in outgoing mail on 03/20/24.    Article #:  [id number]    Address:  [address] [place] [id number]
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
## 2024-03-20 NOTE — TELEPHONE ENCOUNTER
Called patient and LVM stating that meds were sent in to pharmacy for a 45 day supply and that provider is discharging patient due to multiple no shows.

## 2024-03-28 LAB
APOB+LDLR+PCSK9 GENE MUT ANL BLD/T: NOT DETECTED
BRCA1+BRCA2 DEL+DUP + FULL MUT ANL BLD/T: NOT DETECTED
MLH1+MSH2+MSH6+PMS2 GN DEL+DUP+FUL M: NOT DETECTED

## 2024-04-07 DIAGNOSIS — F39 UNSPECIFIED MOOD (AFFECTIVE) DISORDER (HCC): ICD-10-CM

## 2024-04-08 RX ORDER — QUETIAPINE FUMARATE 50 MG/1
50 TABLET, FILM COATED ORAL
Qty: 30 TABLET | Refills: 0 | OUTPATIENT
Start: 2024-04-08

## 2024-04-08 NOTE — TELEPHONE ENCOUNTER
Typically ordered by psychiatrist.  If she is no longer seeing them then I will do I otherwise sent to wrong provider.     Sincerely,     Dr. Najera DO

## 2024-04-09 ENCOUNTER — TELEPHONE (OUTPATIENT)
Dept: PSYCHIATRY | Facility: CLINIC | Age: 34
End: 2024-04-09

## 2024-04-09 LAB

## 2024-04-09 NOTE — TELEPHONE ENCOUNTER
Patient has been added to the Medication Management wait list without a referral.    Insurance: [employer]  Insurance Type:    Commercial []   Medicaid [x]   [facility] (if applicable)   Medicare []  Location Preference: [place]  Provider Preference: no prov pref  Virtual: Yes [] No [x]  Were outside resources sent: Yes [] No [x]

## 2024-04-14 DIAGNOSIS — I10 ESSENTIAL HYPERTENSION: ICD-10-CM

## 2024-04-15 RX ORDER — LOSARTAN POTASSIUM 50 MG/1
50 TABLET ORAL DAILY
Qty: 90 TABLET | Refills: 1 | Status: SHIPPED | OUTPATIENT
Start: 2024-04-15

## 2024-04-16 NOTE — TELEPHONE ENCOUNTER
EGF cannot be calculated due to know specific gender placed on chart.  Therefore cannot be properly calculated.

## 2024-05-01 LAB

## 2024-05-10 LAB

## 2024-05-15 LAB
DME PARACHUTE DELIVERY DATE ACTUAL: NORMAL
DME PARACHUTE DELIVERY DATE EXPECTED: NORMAL
DME PARACHUTE DELIVERY DATE REQUESTED: NORMAL
DME PARACHUTE ITEM DESCRIPTION: NORMAL
DME PARACHUTE ORDER STATUS: NORMAL
DME PARACHUTE SUPPLIER NAME: NORMAL
DME PARACHUTE SUPPLIER PHONE: NORMAL

## 2024-05-21 ENCOUNTER — OFFICE VISIT (OUTPATIENT)
Dept: FAMILY MEDICINE CLINIC | Facility: CLINIC | Age: 34
End: 2024-05-21

## 2024-05-21 VITALS
OXYGEN SATURATION: 98 % | HEIGHT: 63 IN | SYSTOLIC BLOOD PRESSURE: 130 MMHG | HEART RATE: 90 BPM | DIASTOLIC BLOOD PRESSURE: 90 MMHG | BODY MASS INDEX: 39.51 KG/M2 | WEIGHT: 223 LBS

## 2024-05-21 DIAGNOSIS — F31.81 BIPOLAR II DISORDER (HCC): Primary | ICD-10-CM

## 2024-05-21 DIAGNOSIS — L72.9 SUBCUTANEOUS CYST: ICD-10-CM

## 2024-05-21 DIAGNOSIS — E78.2 MIXED HYPERLIPIDEMIA: ICD-10-CM

## 2024-05-21 DIAGNOSIS — I10 ESSENTIAL HYPERTENSION: ICD-10-CM

## 2024-05-21 DIAGNOSIS — E78.1 HYPERTRIGLYCERIDEMIA: ICD-10-CM

## 2024-05-21 DIAGNOSIS — M67.40 GANGLION CYST: ICD-10-CM

## 2024-05-21 DIAGNOSIS — R22.31 MASS OF RIGHT HAND: ICD-10-CM

## 2024-05-21 DIAGNOSIS — F39 UNSPECIFIED MOOD (AFFECTIVE) DISORDER (HCC): ICD-10-CM

## 2024-05-21 DIAGNOSIS — F41.1 GENERALIZED ANXIETY DISORDER: ICD-10-CM

## 2024-05-21 DIAGNOSIS — G47.33 OSA (OBSTRUCTIVE SLEEP APNEA): ICD-10-CM

## 2024-05-21 DIAGNOSIS — K21.9 GASTRO-ESOPHAGEAL REFLUX DISEASE WITHOUT ESOPHAGITIS: ICD-10-CM

## 2024-05-21 PROBLEM — J45.909 ASTHMA: Status: RESOLVED | Noted: 2023-11-02 | Resolved: 2024-05-21

## 2024-05-21 RX ORDER — QUETIAPINE FUMARATE 50 MG/1
TABLET, FILM COATED ORAL
Qty: 90 TABLET | Refills: 0 | Status: SHIPPED | OUTPATIENT
Start: 2024-05-21 | End: 2024-08-20

## 2024-05-21 RX ORDER — VENLAFAXINE HYDROCHLORIDE 150 MG/1
150 CAPSULE, EXTENDED RELEASE ORAL DAILY
Qty: 90 CAPSULE | Refills: 0 | Status: SHIPPED | OUTPATIENT
Start: 2024-05-21 | End: 2024-08-19

## 2024-05-21 RX ORDER — VENLAFAXINE HYDROCHLORIDE 37.5 MG/1
37.5 CAPSULE, EXTENDED RELEASE ORAL DAILY
Qty: 90 CAPSULE | Refills: 0 | Status: SHIPPED | OUTPATIENT
Start: 2024-05-21 | End: 2024-08-19

## 2024-05-21 NOTE — PROGRESS NOTES
Adult Annual Physical  Name: Stella Sorenson      : 1990      MRN: [id number]  Encounter Provider: Semaj Najera DO  Encounter Date: 2024   Encounter department: [facility]    Assessment & Plan   1. Ganglion cyst  4. Mass of right hand  Patient has ganglion cyst right hand over the dorsum of the thumb joint.  It has become more irritating and I have referred her to orthopedic surgery for further intervention.   -     Ambulatory Referral to Orthopedic Surgery; Future    2. Unspecified mood (affective) disorder (HCC)  3. Generalized anxiety disorder  Patient was discharged from her psychiatrist.  She is back on wait list.  She is hopeful to get back on medications.  I reviewed medications and recommended tapering up to prior levels.  Orders placed for both Seroquel and Effexor. See AVS directions for taper.   -     venlafaxine (EFFEXOR-XR) 150 mg 24 hr capsule; Take 1 capsule (150 mg total) by mouth daily  -     venlafaxine (EFFEXOR-XR) 37.5 mg 24 hr capsule; Take 1 capsule (37.5 mg total) by mouth daily    Hypertriglyceridemia  Mixed hyperlipidemia  Stable/ improved.  Last lipid levels were within normal limits with 5mg of crestor.      Essential hypertension  Mildly elevated today.  She has losartan 50mg daily.  She feels that the blood pressures are better outside the office.   Will continue to monitor and inform me if the values continue to creep up into the higher 130's or 140's or higher.     Gastro-esophageal reflux disease without esophagitis  Stable.  Continue with current dosing of prilosec 20mg daily.  Patient symptoms are adequately treated with low dose.     ALEJANDRA (obstructive sleep apnea)  Stable.  Unable to use nasal pillow.  Recommended hybrid fit mask. She will reach out to supplier.        Immunizations and preventive care screenings were discussed with patient today. Appropriate education was printed on patient's after visit  summary.    Counseling:  Alcohol/drug use: discussed moderation in alcohol intake, the recommendations for healthy alcohol use, and avoidance of illicit drug use.  Dental Health: discussed importance of regular tooth brushing, flossing, and dental visits.  Injury prevention: discussed safety/seat belts, safety helmets, smoke detectors, carbon dioxide detectors, and smoking near bedding or upholstery.  Sexual health: discussed sexually transmitted diseases, partner selection, use of condoms, avoidance of unintended pregnancy, and contraceptive alternatives.  Exercise: the importance of regular exercise/physical activity was discussed. Recommend exercise 3-5 times per week for at least 30 minutes.          History of Present Illness     Adult Annual Physical:  Patient presents for annual physical.     Diet and Physical Activity:  - Diet/Nutrition: poor diet and portion control.  - Exercise: no formal exercise.    General Health:  - Sleep: sleeps poorly. needs to get back on medications  - Hearing: normal hearing bilateral ears.  - Vision: goes for regular eye exams and wears glasses.  - Dental: regular dental visits.    /GYN Health:  - Follows with GYN: yes.   - Menopause: premenopausal.   - History of STDs: no  - Contraception:. prior IUD, fell out, loss of pregnancy recently at 3 weeks.        Review of Systems   Constitutional:  Negative for chills and fever.   HENT:  Positive for congestion, postnasal drip and rhinorrhea. Negative for ear discharge, ear pain, hearing loss, sinus pressure, sinus pain and sore throat.    Eyes:  Positive for visual disturbance (wears glasses).   Respiratory:  Positive for shortness of breath (increased activity.). Negative for cough and chest tightness.    Cardiovascular:  Negative for chest pain and palpitations.   Gastrointestinal:  Positive for abdominal pain (pelvic pain, possible adhesion or tightness in pelvic region). Negative for constipation, diarrhea, nausea and vomiting.    Genitourinary:  Negative for dysuria and frequency.   Skin:  Negative for rash and wound.   Allergic/Immunologic: Positive for environmental allergies.   Neurological:  Negative for dizziness, light-headedness and headaches.   Psychiatric/Behavioral:  Positive for decreased concentration, dysphoric mood and suicidal ideas. Negative for self-injury. The patient is nervous/anxious.        Pertinent Medical History   Medical History Reviewed by provider this encounter:  Tobacco  Allergies  Meds  Med Hx  Surg Hx  Soc Hx      Past Medical History   Past Medical History:   Diagnosis Date    Anxiety     Asthma     Cervical cyst     Current moderate episode of major depressive disorder without prior episode (HCC)     Depression     Fatty liver     Hypertension     Hypertriglyceridemia     Mixed hyperlipidemia     Ovarian cyst     Ovarian cyst     left     Past Surgical History:   Procedure Laterality Date    [place] ESOPHAGOGASTRODUODENOSCOPY TRANSORAL DIAGNOSTIC N/A 4/16/2019    Procedure: ESOPHAGOGASTRODUODENOSCOPY (EGD);  Surgeon: Blake Irby MD;  Location: [facility];  Service: Gastroenterology    TOOTH EXTRACTION      WISDOM TOOTH EXTRACTION       Family History   Problem Relation Age of Onset    Hypertension Mother     Arthritis Father     Bipolar disorder Father     Heart disease Father         cardiac disorder    Hypertension Father     Alcohol abuse Father         He has recovered and no longer drinks.    ADD / ADHD Father     Hypertension Sister     No Known Problems Sister     Arthritis Family     Hypertension Family     Hypertension Family     Hypertension Sister     ADD / ADHD Sister     Bipolar disorder Sister     Colon cancer Neg Hx     Ovarian cancer Neg Hx     Breast cancer Neg Hx     Uterine cancer Neg Hx      Current Outpatient Medications on File Prior to Visit   Medication Sig Dispense Refill    albuterol (PROVENTIL HFA,VENTOLIN HFA) 90 mcg/act inhaler Inhale 2 puffs every 6 (six) hours as  needed for wheezing      losartan (COZAAR) 50 mg tablet Take 1 tablet (50 mg total) by mouth daily 90 tablet 1    naproxen (NAPROSYN) 500 mg tablet Take 1 tablet (500 mg total) by mouth every 12 (twelve) hours as needed for mild pain or moderate pain 20 tablet 0    omeprazole (PriLOSEC) 20 mg delayed release capsule Take 1 capsule by mouth once daily 30 capsule 5    rosuvastatin (CRESTOR) 5 mg tablet Take 1 tablet by mouth once daily 30 tablet 5    [DISCONTINUED] Mirena, 52 MG, 20 MCG/DAY IUD       [DISCONTINUED] ondansetron (ZOFRAN-ODT) 4 mg disintegrating tablet Take 1 tablet (4 mg total) by mouth every 6 (six) hours as needed for nausea or vomiting 20 tablet 0    [DISCONTINUED] QUEtiapine (SEROquel) 50 mg tablet TAKE 1 TABLET BY MOUTH ONCE DAILY AT BEDTIME 30 tablet 0    [DISCONTINUED] amphetamine-dextroamphetamine (ADDERALL XR, 5MG,) 5 MG 24 hr capsule Take 1 capsule (5 mg total) by mouth every morning Max Daily Amount: 5 mg 30 capsule 0    [DISCONTINUED] venlafaxine (EFFEXOR-XR) 150 mg 24 hr capsule Take 1 capsule (150 mg total) by mouth daily 45 capsule 0    [DISCONTINUED] venlafaxine (EFFEXOR-XR) 37.5 mg 24 hr capsule Take 1 capsule (37.5 mg total) by mouth daily 45 capsule 0     No current facility-administered medications on file prior to visit.     Allergies   Allergen Reactions    Tylenol [Acetaminophen] Anaphylaxis    Kiwi Extract - Food Allergy Itching    Pineapple Extract - Food Allergy Itching    Latex Rash      Current Outpatient Medications on File Prior to Visit   Medication Sig Dispense Refill    albuterol (PROVENTIL HFA,VENTOLIN HFA) 90 mcg/act inhaler Inhale 2 puffs every 6 (six) hours as needed for wheezing      losartan (COZAAR) 50 mg tablet Take 1 tablet (50 mg total) by mouth daily 90 tablet 1    naproxen (NAPROSYN) 500 mg tablet Take 1 tablet (500 mg total) by mouth every 12 (twelve) hours as needed for mild pain or moderate pain 20 tablet 0    omeprazole (PriLOSEC) 20 mg delayed release  "capsule Take 1 capsule by mouth once daily 30 capsule 5    rosuvastatin (CRESTOR) 5 mg tablet Take 1 tablet by mouth once daily 30 tablet 5    [DISCONTINUED] Mirena, 52 MG, 20 MCG/DAY IUD       [DISCONTINUED] ondansetron (ZOFRAN-ODT) 4 mg disintegrating tablet Take 1 tablet (4 mg total) by mouth every 6 (six) hours as needed for nausea or vomiting 20 tablet 0    [DISCONTINUED] QUEtiapine (SEROquel) 50 mg tablet TAKE 1 TABLET BY MOUTH ONCE DAILY AT BEDTIME 30 tablet 0    [DISCONTINUED] amphetamine-dextroamphetamine (ADDERALL XR, 5MG,) 5 MG 24 hr capsule Take 1 capsule (5 mg total) by mouth every morning Max Daily Amount: 5 mg 30 capsule 0    [DISCONTINUED] venlafaxine (EFFEXOR-XR) 150 mg 24 hr capsule Take 1 capsule (150 mg total) by mouth daily 45 capsule 0    [DISCONTINUED] venlafaxine (EFFEXOR-XR) 37.5 mg 24 hr capsule Take 1 capsule (37.5 mg total) by mouth daily 45 capsule 0     No current facility-administered medications on file prior to visit.      Social History     Tobacco Use    Smoking status: Never    Smokeless tobacco: Never   Vaping Use    Vaping status: Never Used   Substance and Sexual Activity    Alcohol use: Not Currently    Drug use: No    Sexual activity: Yes     Partners: Male     Birth control/protection: None       Objective     /90   Pulse 90   Ht 5' 3\" (1.6 m)   Wt 101 kg (223 lb)   SpO2 98%   BMI 39.50 kg/m²     Physical Exam  Constitutional:       General: Stella is not in acute distress.     Appearance: Normal appearance. Stella is obese. Stella is not ill-appearing, toxic-appearing or diaphoretic.   HENT:      Head: Normocephalic and atraumatic.      Right Ear: Tympanic membrane, ear canal and external ear normal. There is no impacted cerumen.      Left Ear: Tympanic membrane, ear canal and external ear normal. There is no impacted cerumen.      Nose: Nose normal. No congestion or rhinorrhea.      Mouth/Throat:      Mouth: Mucous membranes are moist.      Pharynx: Oropharynx is " clear. No oropharyngeal exudate or posterior oropharyngeal erythema.   Eyes:      General:         Right eye: No discharge.         Left eye: No discharge.      Extraocular Movements: Extraocular movements intact.      Pupils: Pupils are equal, round, and reactive to light.   Cardiovascular:      Rate and Rhythm: Normal rate and regular rhythm.      Heart sounds: Normal heart sounds. No murmur heard.     No friction rub. No gallop.   Pulmonary:      Effort: Pulmonary effort is normal. No respiratory distress.      Breath sounds: Normal breath sounds. No stridor. No wheezing, rhonchi or rales.   Abdominal:      General: Bowel sounds are normal. There is no distension.      Palpations: Abdomen is soft.      Tenderness: There is no abdominal tenderness.   Musculoskeletal:      Cervical back: Neck supple. No tenderness.      Comments: Ganglion cyst present on right hand dorsum of the right thumb.   Lymphadenopathy:      Cervical: No cervical adenopathy.   Neurological:      Mental Status: Stella is alert.           Administrative Statements

## 2024-05-21 NOTE — PATIENT INSTRUCTIONS
If you are interested in the hybrid CPAP mask- name is as follows:   ResMed AirFit™ F30i Full Face CPAP Mask    Please start 150mg effexor every other day for 7 days then can increase to daily.     Please start taking seroquel 25mg for 7 days and then you can start the 50mg.

## 2024-05-22 ENCOUNTER — TELEPHONE (OUTPATIENT)
Age: 34
End: 2024-05-22

## 2024-05-22 NOTE — TELEPHONE ENCOUNTER
Patient is being referred to a orthopedics. Please schedule accordingly.    [facility] [place]   [phone]

## 2024-06-28 ENCOUNTER — OFFICE VISIT (OUTPATIENT)
Dept: OBGYN CLINIC | Facility: CLINIC | Age: 34
End: 2024-06-28
Payer: MEDICARE

## 2024-06-28 ENCOUNTER — HOSPITAL ENCOUNTER (OUTPATIENT)
Dept: RADIOLOGY | Facility: HOSPITAL | Age: 34
End: 2024-06-28
Attending: STUDENT IN AN ORGANIZED HEALTH CARE EDUCATION/TRAINING PROGRAM
Payer: MEDICARE

## 2024-06-28 VITALS — HEIGHT: 63 IN | WEIGHT: 223 LBS | BODY MASS INDEX: 39.51 KG/M2

## 2024-06-28 DIAGNOSIS — M67.40 GANGLION CYST: ICD-10-CM

## 2024-06-28 DIAGNOSIS — R22.31 MASS OF RIGHT HAND: ICD-10-CM

## 2024-06-28 PROCEDURE — 73130 X-RAY EXAM OF HAND: CPT

## 2024-06-28 PROCEDURE — 99214 OFFICE O/P EST MOD 30 MIN: CPT | Performed by: STUDENT IN AN ORGANIZED HEALTH CARE EDUCATION/TRAINING PROGRAM

## 2024-06-28 NOTE — PROGRESS NOTES
ORTHOPAEDIC HAND, WRIST, AND ELBOW OFFICE  VISIT      ASSESSMENT/PLAN:      Diagnoses and all orders for this visit:    Ganglion cyst  -     Ambulatory Referral to Orthopedic Surgery  -     XR hand 3+ vw right; Future    Mass of right hand  -     Ambulatory Referral to Orthopedic Surgery  -     MRI thumb right wo and w contrast; Future  -     Basic metabolic panel; Future          33 y.o. adult with right thumb mass  XRs reviewed.  Treatment options and expected outcomes were discussed.  The patient verbalized understanding of exam findings and treatment plan.   The patient was given the opportunity to ask questions.  Questions were answered to the patient's satisfaction.  The patient agreed to proceed with MRI imaging to further evaluate this.      Follow Up:  Will call after MRI      To Do Next Visit:  Figure out if we can continue care or if referral to Dr. Smiley is needed      Discussions:  The anatomy and physiology of the diagnosis(es) was(were) discussed with the patient today in the office. Treatment options and recommendations were discussed, as well as expected future outcomes.       Tej Garcia MD  Attending, Orthopaedic Surgery  Hand, Wrist, and Elbow Surgery  [facility]    ______________________________________________________________________________________________    CHIEF COMPLAINT:  Chief Complaint   Patient presents with   • Right Hand - Pain       SUBJECTIVE:  Patient is a 33 y.o. RHD adult who presents today for evaluation and treatment of right hand mass. Pt referred by her PCP Dr. Najera. Pt states this has been present for 2 months, getting larger in size. Denies any known trauma to the area. States it can be painful if they hit or brush it. Describes some tingling and itching sensation as well.      Occupation:       I have personally reviewed all the relevant PMH, PSH, SH, FH, Medications and allergies      PAST MEDICAL HISTORY:  Past Medical  History:   Diagnosis Date   • Anxiety    • Asthma    • Cervical cyst    • Current moderate episode of major depressive disorder without prior episode (HCC)    • Depression    • Fatty liver    • Hypertension    • Hypertriglyceridemia    • Mixed hyperlipidemia    • Ovarian cyst    • Ovarian cyst     left       PAST SURGICAL HISTORY:  Past Surgical History:   Procedure Laterality Date   • [place] ESOPHAGOGASTRODUODENOSCOPY TRANSORAL DIAGNOSTIC N/A 4/16/2019    Procedure: ESOPHAGOGASTRODUODENOSCOPY (EGD);  Surgeon: Blake Irby MD;  Location: [facility];  Service: Gastroenterology   • TOOTH EXTRACTION     • WISDOM TOOTH EXTRACTION         FAMILY HISTORY:  Family History   Problem Relation Age of Onset   • Hypertension Mother    • Arthritis Father    • Bipolar disorder Father    • Heart disease Father         cardiac disorder   • Hypertension Father    • Alcohol abuse Father         He has recovered and no longer drinks.   • ADD / ADHD Father    • Hypertension Sister    • No Known Problems Sister    • Arthritis Family    • Hypertension Family    • Hypertension Family    • Hypertension Sister    • ADD / ADHD Sister    • Bipolar disorder Sister    • Colon cancer Neg Hx    • Ovarian cancer Neg Hx    • Breast cancer Neg Hx    • Uterine cancer Neg Hx        SOCIAL HISTORY:  Social History     Tobacco Use   • Smoking status: Never   • Smokeless tobacco: Never   Vaping Use   • Vaping status: Never Used   Substance Use Topics   • Alcohol use: Not Currently   • Drug use: No       MEDICATIONS:    Current Outpatient Medications:   •  albuterol (PROVENTIL HFA,VENTOLIN HFA) 90 mcg/act inhaler, Inhale 2 puffs every 6 (six) hours as needed for wheezing, Disp: , Rfl:   •  losartan (COZAAR) 50 mg tablet, Take 1 tablet (50 mg total) by mouth daily, Disp: 90 tablet, Rfl: 1  •  naproxen (NAPROSYN) 500 mg tablet, Take 1 tablet (500 mg total) by mouth every 12 (twelve) hours as needed for mild pain or moderate pain, Disp: 20 tablet, Rfl: 0  •   omeprazole (PriLOSEC) 20 mg delayed release capsule, Take 1 capsule by mouth once daily, Disp: 30 capsule, Rfl: 5  •  QUEtiapine (SEROquel) 50 mg tablet, Take 0.5 tablets (25 mg total) by mouth daily at bedtime for 7 days, THEN 1 tablet (50 mg total) daily at bedtime., Disp: 90 tablet, Rfl: 0  •  rosuvastatin (CRESTOR) 5 mg tablet, Take 1 tablet by mouth once daily, Disp: 30 tablet, Rfl: 5  •  venlafaxine (EFFEXOR-XR) 150 mg 24 hr capsule, Take 1 capsule (150 mg total) by mouth daily, Disp: 90 capsule, Rfl: 0  •  venlafaxine (EFFEXOR-XR) 37.5 mg 24 hr capsule, Take 1 capsule (37.5 mg total) by mouth daily, Disp: 90 capsule, Rfl: 0    ALLERGIES:  Allergies   Allergen Reactions   • Tylenol [Acetaminophen] Anaphylaxis   • Kiwi Extract - Food Allergy Itching   • Pineapple Extract - Food Allergy Itching   • Latex Rash           REVIEW OF SYSTEMS:  Musculoskeletal:        As noted in HPI.   All other systems reviewed and are negative.    VITALS:  There were no vitals filed for this visit.    LABS:  HgA1c:   Lab Results   Component Value Date    HGBA1C 5.6 03/07/2024     BMP:   Lab Results   Component Value Date    CALCIUM 8.8 03/07/2024    K 3.6 03/07/2024    CO2 26 03/07/2024     03/07/2024    BUN 13 03/07/2024    CREATININE 0.71 03/07/2024       _____________________________________________________  PHYSICAL EXAMINATION:  General: Well developed and well nourished, alert & oriented x 3, appears comfortable  Psychiatric: Normal  HEENT: Normocephalic, Atraumatic Trachea Midline, No torticollis  Pulmonary: No audible wheezing or respiratory distress   Abdomen/GI: Non tender, non distended     Cardiovascular: No pitting edema, 2+ radial  pulse   Skin: No Erythema, No Lacerations, No Fluctuation, No Ulcerations  Neurovascular: Sensation Intact to the Median, Ulnar, Radial Nerve, Motor Intact to the Median, Ulnar, Radial Nerve, and Pulses Intact  Musculoskeletal: Normal, except as noted in detailed exam and in  HPI.      MUSCULOSKELETAL EXAMINATION:  Right Hand:  Pt with mass located along the radial slightly dorsal aspect of the thumb.  This is at the level of the metacarpal.  Not obviously fluid-filled.  10mm L x 10mm W x 6mm H.   FROM of the thumb.   FPL/EPL intact.   Brisk capillary refill.    ___________________________________________________  STUDIES REVIEWED:  Xrays of the right hand were reviewed and independently interpreted in PACS by Dr. Garcia and demonstrate no acute osseous abnormalities.        PROCEDURES PERFORMED:  Procedures  No Procedures performed today    _____________________________________________________      Scribe Attestation    I,:  Amalia Aguila PA-C am acting as a scribe while in the presence of the attending physician.:       I,:  Tej Garcia MD personally performed the services described in this documentation    as scribed in my presence.:

## 2024-07-08 DIAGNOSIS — E78.2 MIXED HYPERLIPIDEMIA: ICD-10-CM

## 2024-07-08 DIAGNOSIS — E78.1 HYPERTRIGLYCERIDEMIA: ICD-10-CM

## 2024-07-08 DIAGNOSIS — K21.9 GASTRO-ESOPHAGEAL REFLUX DISEASE WITHOUT ESOPHAGITIS: ICD-10-CM

## 2024-07-08 RX ORDER — ROSUVASTATIN CALCIUM 5 MG/1
5 TABLET, COATED ORAL DAILY
Qty: 30 TABLET | Refills: 0 | Status: SHIPPED | OUTPATIENT
Start: 2024-07-08

## 2024-07-08 RX ORDER — OMEPRAZOLE 20 MG/1
CAPSULE, DELAYED RELEASE ORAL
Qty: 30 CAPSULE | Refills: 0 | Status: SHIPPED | OUTPATIENT
Start: 2024-07-08

## 2024-07-25 ENCOUNTER — OFFICE VISIT (OUTPATIENT)
Age: 34
End: 2024-07-25
Payer: MEDICARE

## 2024-07-25 VITALS
SYSTOLIC BLOOD PRESSURE: 166 MMHG | DIASTOLIC BLOOD PRESSURE: 91 MMHG | OXYGEN SATURATION: 98 % | TEMPERATURE: 97.7 F | HEART RATE: 90 BPM | HEIGHT: 63 IN | WEIGHT: 227 LBS | BODY MASS INDEX: 40.22 KG/M2 | RESPIRATION RATE: 18 BRPM

## 2024-07-25 DIAGNOSIS — H10.33 ACUTE BACTERIAL CONJUNCTIVITIS OF BOTH EYES: Primary | ICD-10-CM

## 2024-07-25 PROCEDURE — 99213 OFFICE O/P EST LOW 20 MIN: CPT | Performed by: PHYSICIAN ASSISTANT

## 2024-07-25 RX ORDER — OFLOXACIN 3 MG/ML
2 SOLUTION/ DROPS OPHTHALMIC 4 TIMES DAILY
Qty: 5 ML | Refills: 0 | Status: SHIPPED | OUTPATIENT
Start: 2024-07-25 | End: 2024-08-01

## 2024-07-25 NOTE — PROGRESS NOTES
[facility]        NAME: Stella Sorenson is a 33 y.o. adult  : 1990    MRN: [id number]  DATE: 2024  TIME: 7:21 PM    Assessment and Plan   Acute bacterial conjunctivitis of both eyes [H10.33]  1. Acute bacterial conjunctivitis of both eyes  ofloxacin (OCUFLOX) 0.3 % ophthalmic solution          Patient bilateral eye conjunctivitis.  Drops prescribed.  Recommend continue warm compresses.    The patient and/or parent/legal guardian verbalized understanding of exam findings and   Treatment plan. We engaged in the shared decision-making process and treatment options were   discussed at length with the patient.  All questions, concerns and complaints were answered and   addressed to the patient's' and/or parent/legal guardians's satisfaction.    Patient Instructions   There are no Patient Instructions on file for this visit.    Follow up with PCP in 3-5 days.  Proceed to  ER if symptoms worsen.    If tests are performed, our office will contact you with results only if   changes need to made to the care plan discussed with you at the visit.   You can [facility] full [facility] on [facility].     Chief Complaint     Chief Complaint   Patient presents with   • Conjunctivitis     Started 5 days ago, patient complains of redness, discharge, pain and itching of bilateral eyes.          History of Present Illness       HPI  Patient presents complaining of 5 days of bilateral eye redness and discharge.  She reports occasional blurriness before rubbing her eyes and clearing the film from her eyes.  She denies any contact use.  Denies any loss of visual acuity.  No double vision.  No headache.  No fever or chill    Review of Systems   Review of Systems  All other related systems reviewed and are negative except as noted in HPI    Current Medications       Current Outpatient Medications:   •  albuterol (PROVENTIL HFA,VENTOLIN HFA) 90 mcg/act inhaler, Inhale 2 puffs every 6 (six) hours as needed for  wheezing, Disp: , Rfl:   •  losartan (COZAAR) 50 mg tablet, Take 1 tablet (50 mg total) by mouth daily, Disp: 90 tablet, Rfl: 1  •  ofloxacin (OCUFLOX) 0.3 % ophthalmic solution, Administer 2 drops to both eyes 4 (four) times a day for 7 days, Disp: 5 mL, Rfl: 0  •  omeprazole (PriLOSEC) 20 mg delayed release capsule, Take 1 capsule by mouth once daily, Disp: 30 capsule, Rfl: 0  •  QUEtiapine (SEROquel) 50 mg tablet, Take 0.5 tablets (25 mg total) by mouth daily at bedtime for 7 days, THEN 1 tablet (50 mg total) daily at bedtime., Disp: 90 tablet, Rfl: 0  •  rosuvastatin (CRESTOR) 5 mg tablet, Take 1 tablet by mouth once daily, Disp: 30 tablet, Rfl: 0  •  venlafaxine (EFFEXOR-XR) 150 mg 24 hr capsule, Take 1 capsule (150 mg total) by mouth daily, Disp: 90 capsule, Rfl: 0  •  venlafaxine (EFFEXOR-XR) 37.5 mg 24 hr capsule, Take 1 capsule (37.5 mg total) by mouth daily, Disp: 90 capsule, Rfl: 0  •  naproxen (NAPROSYN) 500 mg tablet, Take 1 tablet (500 mg total) by mouth every 12 (twelve) hours as needed for mild pain or moderate pain (Patient not taking: Reported on 7/25/2024), Disp: 20 tablet, Rfl: 0    Current Allergies     Allergies as of 07/25/2024 - Reviewed 07/25/2024   Allergen Reaction Noted   • Tylenol [acetaminophen] Anaphylaxis 08/19/2018   • Kiwi extract - food allergy Itching 08/24/2023   • Pineapple extract - food allergy Itching 08/24/2023   • Latex Rash 08/19/2018            The following portions of the patient's history were reviewed and updated as appropriate: allergies, current medications, past family history, past medical history, past social history, past surgical history and problem list.     Past Medical History:   Diagnosis Date   • Anxiety    • Asthma    • Cervical cyst    • Current moderate episode of major depressive disorder without prior episode (HCC)    • Depression    • Fatty liver    • Hypertension    • Hypertriglyceridemia    • Mixed hyperlipidemia    • Ovarian cyst    • Ovarian cyst  "    left       Past Surgical History:   Procedure Laterality Date   • [place] ESOPHAGOGASTRODUODENOSCOPY TRANSORAL DIAGNOSTIC N/A 4/16/2019    Procedure: ESOPHAGOGASTRODUODENOSCOPY (EGD);  Surgeon: Blake Irby MD;  Location: [facility];  Service: Gastroenterology   • TOOTH EXTRACTION     • WISDOM TOOTH EXTRACTION         Family History   Problem Relation Age of Onset   • Hypertension Mother    • Arthritis Father    • Bipolar disorder Father    • Heart disease Father         cardiac disorder   • Hypertension Father    • Alcohol abuse Father         He has recovered and no longer drinks.   • ADD / ADHD Father    • Hypertension Sister    • No Known Problems Sister    • Arthritis Family    • Hypertension Family    • Hypertension Family    • Hypertension Sister    • ADD / ADHD Sister    • Bipolar disorder Sister    • Colon cancer Neg Hx    • Ovarian cancer Neg Hx    • Breast cancer Neg Hx    • Uterine cancer Neg Hx          Medications have been verified.        Objective   /91   Pulse 90   Temp 97.7 °F (36.5 °C)   Resp 18   Ht 5' 3\" (1.6 m)   Wt 103 kg (227 lb)   SpO2 98%   BMI 40.21 kg/m²   No LMP recorded. Patient has had an implant.       Physical Exam     Physical Exam  Constitutional:       General: Stella is not in acute distress.     Appearance: Stella is well-developed.   HENT:      Head: Normocephalic and atraumatic.   Eyes:      General: No scleral icterus.        Right eye: Discharge present.         Left eye: Discharge present.     Extraocular Movements: Extraocular movements intact.      Pupils: Pupils are equal, round, and reactive to light.   Pulmonary:      Effort: Pulmonary effort is normal. No respiratory distress.      Breath sounds: No stridor.   Musculoskeletal:      Cervical back: Normal range of motion and neck supple.   Skin:     General: Skin is warm and dry.   Neurological:      Mental Status: Stella is alert and oriented to person, place, and time.   Psychiatric:         Behavior: " "Behavior normal.         Ortho Exam        Procedures  No Procedures performed today        Note: Portions of this record may have been created with voice recognition software. Occasional wrong word or \"sound a like\" substitutions may have occurred due to the inherent limitations of voice recognition software. Please read the chart carefully and recognize, using context, where substitutions have occurred.*      "

## 2024-08-06 ENCOUNTER — HOSPITAL ENCOUNTER (OUTPATIENT)
Dept: MRI IMAGING | Facility: HOSPITAL | Age: 34
Discharge: HOME | End: 2024-08-06
Payer: MEDICARE

## 2024-08-06 DIAGNOSIS — R22.31 MASS OF RIGHT HAND: ICD-10-CM

## 2024-08-06 PROCEDURE — 73220 MRI UPPR EXTREMITY W/O&W/DYE: CPT

## 2024-08-06 RX ORDER — GADOBUTROL 604.72 MG/ML
10 INJECTION INTRAVENOUS
Status: COMPLETED | OUTPATIENT
Start: 2024-08-06 | End: 2024-08-07

## 2024-08-07 PROCEDURE — A9585 GADOBUTROL INJECTION: HCPCS | Performed by: FAMILY MEDICINE

## 2024-08-07 RX ADMIN — GADOBUTROL 10 ML: 604.72 INJECTION INTRAVENOUS at 00:14

## 2024-08-08 ENCOUNTER — PREP FOR PROCEDURE (OUTPATIENT)
Dept: OTHER | Facility: HOSPITAL | Age: 34
End: 2024-08-08

## 2024-08-08 DIAGNOSIS — R22.31 MASS OF RIGHT HAND: Primary | ICD-10-CM

## 2024-08-08 RX ORDER — CEFAZOLIN SODIUM 2 G/50ML
2000 SOLUTION INTRAVENOUS ONCE
OUTPATIENT
Start: 2024-08-08 | End: 2024-08-08

## 2024-08-08 RX ORDER — CHLORHEXIDINE GLUCONATE ORAL RINSE 1.2 MG/ML
15 SOLUTION DENTAL ONCE
OUTPATIENT
Start: 2024-08-08 | End: 2024-08-08

## 2024-08-08 NOTE — QUICK NOTE
Called and spoke with patient to discuss MRI results.  Discussed that right hand mass appears to be superficial, however, unfortunately cannot discern if it is a mass or a cyst.  We discussed treatment options including observation or surgical excision.  We discussed risk, benefits, and alternatives to surgical management.  We discussed risk of surgery including pain, bleeding, stiffness, infection, need for further surgeries.  We discussed the real, although low, possibility of malignancy.  Patient would like to proceed with right hand mass excision.  Informed consent was obtained.

## 2024-08-09 ENCOUNTER — TELEPHONE (OUTPATIENT)
Dept: OBGYN CLINIC | Facility: CLINIC | Age: 34
End: 2024-08-09

## 2024-08-09 NOTE — TELEPHONE ENCOUNTER
----- Message from Tej Garcia MD sent at 8/8/2024  3:43 PM EDT -----  Please call and set up surgery for 8/22. I called and spoke with patient about MRI results. Thank you.

## 2024-08-09 NOTE — TELEPHONE ENCOUNTER
L/m for pt to return call to discuss surgery on 8/22 with Dr. Garcia.  Provided my direct call back number.

## 2024-08-12 ENCOUNTER — TELEPHONE (OUTPATIENT)
Dept: OBGYN CLINIC | Facility: CLINIC | Age: 34
End: 2024-08-12

## 2024-08-12 NOTE — TELEPHONE ENCOUNTER
L/m for pt to return call to review pre-op instructions for surgery on 8/22 with Dr. Garcia.  Provided my direct call back number

## 2024-08-12 NOTE — TELEPHONE ENCOUNTER
Pt returned call and pre-op instructions were reviewed.  She is aware not to eat/drink anything after midnight the night before and that she will need a  on DOS.  Pt aware that she will receive a phone call the day before, after 2 PM, with her arrival time.  P/o appt information provided and washing instructions review. Pt aware that surgery is at [facility].  Pt has my direct number for any questions that arise.

## 2024-08-14 DIAGNOSIS — F41.1 GENERALIZED ANXIETY DISORDER: ICD-10-CM

## 2024-08-14 DIAGNOSIS — F39 UNSPECIFIED MOOD (AFFECTIVE) DISORDER (HCC): ICD-10-CM

## 2024-08-15 RX ORDER — VENLAFAXINE HYDROCHLORIDE 150 MG/1
150 CAPSULE, EXTENDED RELEASE ORAL DAILY
Qty: 100 CAPSULE | Refills: 1 | Status: SHIPPED | OUTPATIENT
Start: 2024-08-15

## 2024-08-15 RX ORDER — VENLAFAXINE HYDROCHLORIDE 37.5 MG/1
37.5 CAPSULE, EXTENDED RELEASE ORAL DAILY
Qty: 100 CAPSULE | Refills: 1 | Status: SHIPPED | OUTPATIENT
Start: 2024-08-15

## 2024-08-19 ENCOUNTER — ANESTHESIA EVENT (OUTPATIENT)
Dept: PERIOP | Facility: AMBULARY SURGERY CENTER | Age: 34
End: 2024-08-19
Payer: MEDICARE

## 2024-08-21 NOTE — PRE-PROCEDURE INSTRUCTIONS
Pre-Surgery Instructions:   Medication Instructions    albuterol (PROVENTIL HFA,VENTOLIN HFA) 90 mcg/act inhaler Uses PRN- OK to take day of surgery    losartan (COZAAR) 50 mg tablet Take night before surgery    omeprazole (PriLOSEC) 20 mg delayed release capsule Take day of surgery.    rosuvastatin (CRESTOR) 5 mg tablet Take day of surgery.    venlafaxine (EFFEXOR-XR) 150 mg 24 hr capsule Take day of surgery.    venlafaxine (EFFEXOR-XR) 37.5 mg 24 hr capsule Take day of surgery.      Medication instructions for day surgery reviewed. Please use only a sip of water to take your instructed medications. Avoid all over the counter vitamins, supplements and NSAIDS for one week prior to surgery per anesthesia guidelines. Tylenol is ok to take as needed.     You will receive a call one business day prior to surgery with an arrival time and hospital directions. If your surgery is scheduled on a Monday, the hospital will be calling you on the Friday prior to your surgery. If you have not heard from anyone by 8pm, please call the hospital supervisor through the hospital  at [phone]. ([place] [phone] or [place] [phone]).    Do not eat or drink anything after midnight the night before your surgery, including candy, mints, lifesavers, or chewing gum. Do not drink alcohol 24hrs before your surgery. Try not to smoke at least 24hrs before your surgery.       Follow the pre surgery showering instructions as listed in the “My Surgical Experience Booklet” or otherwise provided by your surgeon's office. Do not use a blade to shave the surgical area 1 week before surgery. It is okay to use a clean electric clippers up to 24 hours before surgery. Do not apply any lotions, creams, including makeup, cologne, deodorant, or perfumes after showering on the day of your surgery. Do not use dry shampoo, hair spray, hair gel, or any type of hair products.     No contact lenses, eye make-up, or artificial eyelashes. Remove  nail polish, including gel polish, and any artificial, gel, or acrylic nails if possible. Remove all jewelry including rings and body piercing jewelry.     Wear causal clothing that is easy to take on and off. Consider your type of surgery.    Keep any valuables, jewelry, piercings at home. Please bring any specially ordered equipment (sling, braces) if indicated.    Arrange for a responsible person to drive you to and from the hospital on the day of your surgery. Please confirm the visitor policy for the day of your procedure when you receive your phone call with an arrival time.     Call the surgeon's office with any new illnesses, exposures, or additional questions prior to surgery.    Please reference your “My Surgical Experience Booklet” for additional information to prepare for your upcoming surgery.

## 2024-08-22 ENCOUNTER — ANESTHESIA (OUTPATIENT)
Dept: PERIOP | Facility: AMBULARY SURGERY CENTER | Age: 34
End: 2024-08-22
Payer: MEDICARE

## 2024-08-22 ENCOUNTER — HOSPITAL ENCOUNTER (OUTPATIENT)
Facility: AMBULARY SURGERY CENTER | Age: 34
Setting detail: OUTPATIENT SURGERY
Discharge: HOME | End: 2024-08-22
Attending: STUDENT IN AN ORGANIZED HEALTH CARE EDUCATION/TRAINING PROGRAM | Admitting: STUDENT IN AN ORGANIZED HEALTH CARE EDUCATION/TRAINING PROGRAM
Payer: MEDICARE

## 2024-08-22 VITALS
HEART RATE: 95 BPM | SYSTOLIC BLOOD PRESSURE: 156 MMHG | RESPIRATION RATE: 18 BRPM | OXYGEN SATURATION: 99 % | TEMPERATURE: 97.2 F | DIASTOLIC BLOOD PRESSURE: 97 MMHG

## 2024-08-22 DIAGNOSIS — R22.31 MASS OF RIGHT HAND: ICD-10-CM

## 2024-08-22 DIAGNOSIS — Z98.890 S/P CARPAL TUNNEL RELEASE: Primary | ICD-10-CM

## 2024-08-22 PROCEDURE — 88342 IMHCHEM/IMCYTCHM 1ST ANTB: CPT | Performed by: PATHOLOGY

## 2024-08-22 PROCEDURE — 12041 INTMD RPR N-HF/GENIT 2.5CM/<: CPT | Performed by: STUDENT IN AN ORGANIZED HEALTH CARE EDUCATION/TRAINING PROGRAM

## 2024-08-22 PROCEDURE — 11422 EXC H-F-NK-SP B9+MARG 1.1-2: CPT | Performed by: PHYSICIAN ASSISTANT

## 2024-08-22 PROCEDURE — 88305 TISSUE EXAM BY PATHOLOGIST: CPT | Performed by: PATHOLOGY

## 2024-08-22 PROCEDURE — 88341 IMHCHEM/IMCYTCHM EA ADD ANTB: CPT | Performed by: PATHOLOGY

## 2024-08-22 PROCEDURE — 12041 INTMD RPR N-HF/GENIT 2.5CM/<: CPT | Performed by: PHYSICIAN ASSISTANT

## 2024-08-22 PROCEDURE — 11422 EXC H-F-NK-SP B9+MARG 1.1-2: CPT | Performed by: STUDENT IN AN ORGANIZED HEALTH CARE EDUCATION/TRAINING PROGRAM

## 2024-08-22 PROCEDURE — NC001 PR NO CHARGE: Performed by: STUDENT IN AN ORGANIZED HEALTH CARE EDUCATION/TRAINING PROGRAM

## 2024-08-22 RX ORDER — OXYCODONE HYDROCHLORIDE 5 MG/1
5 TABLET ORAL EVERY 6 HOURS PRN
Status: DISCONTINUED | OUTPATIENT
Start: 2024-08-22 | End: 2024-08-22 | Stop reason: HOSPADM

## 2024-08-22 RX ORDER — LIDOCAINE HYDROCHLORIDE 20 MG/ML
INJECTION, SOLUTION EPIDURAL; INFILTRATION; INTRACAUDAL; PERINEURAL AS NEEDED
Status: DISCONTINUED | OUTPATIENT
Start: 2024-08-22 | End: 2024-08-22

## 2024-08-22 RX ORDER — ONDANSETRON 2 MG/ML
INJECTION INTRAMUSCULAR; INTRAVENOUS AS NEEDED
Status: DISCONTINUED | OUTPATIENT
Start: 2024-08-22 | End: 2024-08-22

## 2024-08-22 RX ORDER — MEPERIDINE HYDROCHLORIDE 25 MG/ML
12.5 INJECTION INTRAMUSCULAR; INTRAVENOUS; SUBCUTANEOUS
Status: DISCONTINUED | OUTPATIENT
Start: 2024-08-22 | End: 2024-08-22 | Stop reason: HOSPADM

## 2024-08-22 RX ORDER — MIDAZOLAM HYDROCHLORIDE 2 MG/2ML
INJECTION, SOLUTION INTRAMUSCULAR; INTRAVENOUS AS NEEDED
Status: DISCONTINUED | OUTPATIENT
Start: 2024-08-22 | End: 2024-08-22

## 2024-08-22 RX ORDER — CHLORHEXIDINE GLUCONATE ORAL RINSE 1.2 MG/ML
15 SOLUTION DENTAL ONCE
Status: DISCONTINUED | OUTPATIENT
Start: 2024-08-22 | End: 2024-08-22 | Stop reason: HOSPADM

## 2024-08-22 RX ORDER — ONDANSETRON 2 MG/ML
4 INJECTION INTRAMUSCULAR; INTRAVENOUS ONCE AS NEEDED
Status: DISCONTINUED | OUTPATIENT
Start: 2024-08-22 | End: 2024-08-22 | Stop reason: HOSPADM

## 2024-08-22 RX ORDER — CEFAZOLIN SODIUM 2 G/50ML
2000 SOLUTION INTRAVENOUS ONCE
Status: COMPLETED | OUTPATIENT
Start: 2024-08-22 | End: 2024-08-22

## 2024-08-22 RX ORDER — OXYCODONE HYDROCHLORIDE 5 MG/1
5 TABLET ORAL EVERY 6 HOURS PRN
Qty: 5 TABLET | Refills: 0 | Status: SHIPPED | OUTPATIENT
Start: 2024-08-22

## 2024-08-22 RX ORDER — SODIUM CHLORIDE, SODIUM LACTATE, POTASSIUM CHLORIDE, CALCIUM CHLORIDE 600; 310; 30; 20 MG/100ML; MG/100ML; MG/100ML; MG/100ML
INJECTION, SOLUTION INTRAVENOUS CONTINUOUS PRN
Status: DISCONTINUED | OUTPATIENT
Start: 2024-08-22 | End: 2024-08-22

## 2024-08-22 RX ORDER — FENTANYL CITRATE 50 UG/ML
INJECTION, SOLUTION INTRAMUSCULAR; INTRAVENOUS AS NEEDED
Status: DISCONTINUED | OUTPATIENT
Start: 2024-08-22 | End: 2024-08-22

## 2024-08-22 RX ORDER — MAGNESIUM HYDROXIDE 1200 MG/15ML
LIQUID ORAL AS NEEDED
Status: DISCONTINUED | OUTPATIENT
Start: 2024-08-22 | End: 2024-08-22 | Stop reason: HOSPADM

## 2024-08-22 RX ORDER — KETOROLAC TROMETHAMINE 30 MG/ML
INJECTION, SOLUTION INTRAMUSCULAR; INTRAVENOUS AS NEEDED
Status: DISCONTINUED | OUTPATIENT
Start: 2024-08-22 | End: 2024-08-22

## 2024-08-22 RX ORDER — PROPOFOL 10 MG/ML
INJECTION, EMULSION INTRAVENOUS AS NEEDED
Status: DISCONTINUED | OUTPATIENT
Start: 2024-08-22 | End: 2024-08-22

## 2024-08-22 RX ORDER — FENTANYL CITRATE/PF 50 MCG/ML
25 SYRINGE (ML) INJECTION
Status: DISCONTINUED | OUTPATIENT
Start: 2024-08-22 | End: 2024-08-22 | Stop reason: HOSPADM

## 2024-08-22 RX ADMIN — SODIUM CHLORIDE, SODIUM LACTATE, POTASSIUM CHLORIDE, AND CALCIUM CHLORIDE: .6; .31; .03; .02 INJECTION, SOLUTION INTRAVENOUS at 09:33

## 2024-08-22 RX ADMIN — MIDAZOLAM 2 MG: 1 INJECTION INTRAMUSCULAR; INTRAVENOUS at 09:38

## 2024-08-22 RX ADMIN — KETOROLAC TROMETHAMINE 15 MG: 30 INJECTION, SOLUTION INTRAMUSCULAR; INTRAVENOUS at 10:08

## 2024-08-22 RX ADMIN — PROPOFOL 200 MG: 10 INJECTION, EMULSION INTRAVENOUS at 09:43

## 2024-08-22 RX ADMIN — ONDANSETRON 4 MG: 2 INJECTION INTRAMUSCULAR; INTRAVENOUS at 09:46

## 2024-08-22 RX ADMIN — FENTANYL CITRATE 25 MCG: 50 INJECTION INTRAMUSCULAR; INTRAVENOUS at 09:51

## 2024-08-22 RX ADMIN — LIDOCAINE HYDROCHLORIDE 40 MG: 20 INJECTION, SOLUTION EPIDURAL; INFILTRATION; INTRACAUDAL at 09:43

## 2024-08-22 RX ADMIN — FENTANYL CITRATE 25 MCG: 50 INJECTION INTRAMUSCULAR; INTRAVENOUS at 09:54

## 2024-08-22 RX ADMIN — FENTANYL CITRATE 50 MCG: 50 INJECTION INTRAMUSCULAR; INTRAVENOUS at 09:41

## 2024-08-22 RX ADMIN — CEFAZOLIN SODIUM 2000 MG: 2 SOLUTION INTRAVENOUS at 09:38

## 2024-08-22 NOTE — DISCHARGE INSTR - AVS FIRST PAGE
Post Operative Instructions    You have had surgery on your arm today, please read and follow the information below:  Elevate your hand above your elbow during the next 24-48 hours to help with swelling.  Place your hand and arm over your head with motion at your shoulder three times a day.  Do not apply any cream/ointment/oil to your incisions including antibiotics (I.e.Neosporin)  Do not use peroxide to clean your wound   Do not soak your hands in standing water (dishwater, tubs, Jacuzzi's, pools, etc.) until given permission (typically 2-3 weeks after injury)    Call the office if you notice any:  Increased numbness or tingling of your hand or fingers that is not relieved with elevation.  Increasing pain that is not controlled with medication.  Difficulty chewing, breathing, swallowing.  Chest pains or shortness of breath.  Fever over 101.4 degrees.    Bandage: Do NOT remove bandage until follow-up appointment.    Motion: Move fingers into a fist 5 times a day, DO NOT move any splinted fingers.    Weight bearing status: Avoid heavy lifting (>2 pounds) with the extremity that was operated on until follow up appointment. Normal activities of daily living are OK.    Ice: Ice for 10 minutes every hour as needed for swelling x 24 hours.    Sling: No sling necessary.    Pain medication:   Oxycodone 1 tab every 6 hours AS NEEDED for pain  *   You may take Tylenol (acetaminophen) in addition to your pain medication. This is over the counter. Please follow the instructions on the bottle. BE AWARE - your pain medication (hydrocodone/oxycodone) may already include acetaminophen in it. If it does, you must include this in your daily amount and make sure not take more than 3000 mg per day.   *   You may take an antiinflammatory medication (i.e. ibuprofen/naproxen) in addition to your pain medication. These are found over the counter, but higher prescription doses are available if needed. Please follow the dosing instructions  on the bottle and it is recommended you take these with food. DO NOT take these medications if you are on a blood thinner, have history of gastrointestinal bleeding, chronic kidney disease, or if you have ever been told by a physician not to. You CAN take this with Tylenol (acetaminophen), but should only take ONE antiinflammatory at a time.    Antibiotics:  None     Therapy: No therapy needed at this time.    Follow-up Appointment: 10-14 days.        Please call the office if you have any questions or concerns regarding your post-operative care.

## 2024-08-22 NOTE — H&P
H&P reviewed. After examining the patient I find no changes in the patients condition since the H&P had been written. Surgical site marked with patient participation. All questions were answered. We called and spoke with patient on 8/8/24 regarding MRI. Discussed unable to discern if cyst or mass. Informed consent was obtained.    Vitals:    08/22/24 0850   BP: 145/94   Pulse: 99   Resp: 18   Temp: 98 °F (36.7 °C)   SpO2: 99%     ORTHOPAEDIC HAND, WRIST, AND ELBOW OFFICE  VISIT        ASSESSMENT/PLAN:       Diagnoses and all orders for this visit:     Ganglion cyst  -     Ambulatory Referral to Orthopedic Surgery  -     XR hand 3+ vw right; Future     Mass of right hand  -     Ambulatory Referral to Orthopedic Surgery  -     MRI thumb right wo and w contrast; Future  -     Basic metabolic panel; Future              33 y.o. adult with right thumb mass  XRs reviewed.  Treatment options and expected outcomes were discussed.  The patient verbalized understanding of exam findings and treatment plan.   The patient was given the opportunity to ask questions.  Questions were answered to the patient's satisfaction.  The patient agreed to proceed with MRI imaging to further evaluate this.        Follow Up:  Will call after MRI        To Do Next Visit:  Figure out if we can continue care or if referral to Dr. Smiley is needed        Discussions:  The anatomy and physiology of the diagnosis(es) was(were) discussed with the patient today in the office. Treatment options and recommendations were discussed, as well as expected future outcomes.         Tej Garcia MD  Attending, Orthopaedic Surgery  Hand, Wrist, and Elbow Surgery  [facility]     ______________________________________________________________________________________________     CHIEF COMPLAINT:      Chief Complaint   Patient presents with    Right Hand - Pain         SUBJECTIVE:  Patient is a 33 y.o. RHD adult who presents today for  evaluation and treatment of right hand mass. Pt referred by her PCP Dr. Najera. Pt states this has been present for 2 months, getting larger in size. Denies any known trauma to the area. States it can be painful if they hit or brush it. Describes some tingling and itching sensation as well.      Occupation:        I have personally reviewed all the relevant PMH, PSH, SH, FH, Medications and allergies        PAST MEDICAL HISTORY:  Medical History        Past Medical History:   Diagnosis Date    Anxiety      Asthma      Cervical cyst      Current moderate episode of major depressive disorder without prior episode (HCC)      Depression      Fatty liver      Hypertension      Hypertriglyceridemia      Mixed hyperlipidemia      Ovarian cyst      Ovarian cyst       left            PAST SURGICAL HISTORY:  Surgical History         Past Surgical History:   Procedure Laterality Date    [place] ESOPHAGOGASTRODUODENOSCOPY TRANSORAL DIAGNOSTIC N/A 4/16/2019     Procedure: ESOPHAGOGASTRODUODENOSCOPY (EGD);  Surgeon: Blake Irby MD;  Location: [facility];  Service: Gastroenterology    TOOTH EXTRACTION        WISDOM TOOTH EXTRACTION                FAMILY HISTORY:  Family History         Family History   Problem Relation Age of Onset    Hypertension Mother      Arthritis Father      Bipolar disorder Father      Heart disease Father           cardiac disorder    Hypertension Father      Alcohol abuse Father           He has recovered and no longer drinks.    ADD / ADHD Father      Hypertension Sister      No Known Problems Sister      Arthritis Family      Hypertension Family      Hypertension Family      Hypertension Sister      ADD / ADHD Sister      Bipolar disorder Sister      Colon cancer Neg Hx      Ovarian cancer Neg Hx      Breast cancer Neg Hx      Uterine cancer Neg Hx              SOCIAL HISTORY:  Social History   Social History           Tobacco Use    Smoking status: Never    Smokeless tobacco: Never    Vaping Use    Vaping status: Never Used   Substance Use Topics    Alcohol use: Not Currently    Drug use: No            MEDICATIONS:    Current Medications      Current Outpatient Medications:     albuterol (PROVENTIL HFA,VENTOLIN HFA) 90 mcg/act inhaler, Inhale 2 puffs every 6 (six) hours as needed for wheezing, Disp: , Rfl:     losartan (COZAAR) 50 mg tablet, Take 1 tablet (50 mg total) by mouth daily, Disp: 90 tablet, Rfl: 1    naproxen (NAPROSYN) 500 mg tablet, Take 1 tablet (500 mg total) by mouth every 12 (twelve) hours as needed for mild pain or moderate pain, Disp: 20 tablet, Rfl: 0    omeprazole (PriLOSEC) 20 mg delayed release capsule, Take 1 capsule by mouth once daily, Disp: 30 capsule, Rfl: 5    QUEtiapine (SEROquel) 50 mg tablet, Take 0.5 tablets (25 mg total) by mouth daily at bedtime for 7 days, THEN 1 tablet (50 mg total) daily at bedtime., Disp: 90 tablet, Rfl: 0    rosuvastatin (CRESTOR) 5 mg tablet, Take 1 tablet by mouth once daily, Disp: 30 tablet, Rfl: 5    venlafaxine (EFFEXOR-XR) 150 mg 24 hr capsule, Take 1 capsule (150 mg total) by mouth daily, Disp: 90 capsule, Rfl: 0    venlafaxine (EFFEXOR-XR) 37.5 mg 24 hr capsule, Take 1 capsule (37.5 mg total) by mouth daily, Disp: 90 capsule, Rfl: 0        ALLERGIES:  Allergies        Allergies   Allergen Reactions    Tylenol [Acetaminophen] Anaphylaxis    Kiwi Extract - Food Allergy Itching    Pineapple Extract - Food Allergy Itching    Latex Rash                  REVIEW OF SYSTEMS:  Musculoskeletal:        As noted in HPI.   All other systems reviewed and are negative.     VITALS:  There were no vitals filed for this visit.     LABS:  HgA1c:         Lab Results   Component Value Date     HGBA1C 5.6 03/07/2024      BMP:         Lab Results   Component Value Date     CALCIUM 8.8 03/07/2024     K 3.6 03/07/2024     CO2 26 03/07/2024      03/07/2024     BUN 13 03/07/2024     CREATININE 0.71 03/07/2024          _____________________________________________________  PHYSICAL EXAMINATION:  General: Well developed and well nourished, alert & oriented x 3, appears comfortable  Psychiatric: Normal  HEENT: Normocephalic, Atraumatic Trachea Midline, No torticollis  Pulmonary: No audible wheezing or respiratory distress   Abdomen/GI: Non tender, non distended                     Cardiovascular: No pitting edema, 2+ radial  pulse   Skin: No Erythema, No Lacerations, No Fluctuation, No Ulcerations  Neurovascular: Sensation Intact to the Median, Ulnar, Radial Nerve, Motor Intact to the Median, Ulnar, Radial Nerve, and Pulses Intact  Musculoskeletal: Normal, except as noted in detailed exam and in HPI.        MUSCULOSKELETAL EXAMINATION:  Right Hand:  Pt with mass located along the radial slightly dorsal aspect of the thumb.  This is at the level of the metacarpal.  Not obviously fluid-filled.  10mm L x 10mm W x 6mm H.   FROM of the thumb.   FPL/EPL intact.   Brisk capillary refill.     ___________________________________________________  STUDIES REVIEWED:  Xrays of the right hand were reviewed and independently interpreted in PACS by Dr. Garcia and demonstrate no acute osseous abnormalities.           PROCEDURES PERFORMED:  Procedures  No Procedures performed today     _____________________________________________________             Scribe Attestation    I,:  Amalia Aguila PA-C am acting as a scribe while in the presence of the attending physician.:       I,:  Tej Garcia MD personally performed the services described in this documentation    as scribed in my presence.:                     Electronically signed by Tej Garcia MD at 6/28/2024 11:03 AM

## 2024-08-22 NOTE — OP NOTE
OPERATIVE REPORT  PATIENT NAME: Stella Sorenson    :  1990  MRN: [id number]  Pt Location: [facility] OR [facility]OM 05    SURGERY DATE: 2024     Surgeons and Role:  Tej Garcia MD - Primary  VASQUEZ Yanes - Assisting    Physician Assistant need: A physician assistant was required during the procedure for retraction, tissue handling, dissection, and suturing. No qualified resident was available.    Pre-Op Diagnosis Codes:      * Mass of right hand [R22.31]    Post-Op Diagnosis Codes:     * Mass of right hand [R22.31]    Procedure(s) (LRB):  MARGINAL EXCISION MASS - RIGHT HAND, SUBCUTANEOUS LEVEL    Specimen(s):  ID Type Source Tests Collected by Time Destination   1 : RIGHT HAND MASS - SEE COMMENTS Tissue Soft Tissue, Other TISSUE EXAM Tej Garcia MD 2024 [id number]        Estimated Blood Loss:   Minimal    Drains:  None    Implants:  * No implants in log *    Anesthesia Type:   IV Sedation with Anesthesia    Operative Indications:  Patient is a 33 y.o. adult evaluated in clinic with symptoms and clinical exam consistent with Right hand mass.  We discussed treatment options with patient including conservative management and surgical management. Discussed nonoperative management with observation.  We discussed risk of surgery including pain, bleeding, stiffness, damage to neurovascular structures, infection, need for therapy, recurrence of mass.  We discussed low, but real possibility of malignant process and need for further surgical management. Patient elected for surgical management with mass excision.  Informed consent was obtained.     Operative Findings:  Right hand mass was marginally excised.       Complications:   None     Procedure and Technique:  Patient was identified in the preoperative holding area.  Surgical sites were marked with patient participation. Patient was taken back to the operating theater.  Anesthesia was induced. Extremity was prepped and draped in typical fashion.  Formal  time-out was performed confirming site, patient, and procedure. All present were in agreement.       Mass was identified over radial aspect of hand around the thenar musculature.  Skin was incised longitudinally along the mass.  Mass was noted to be adhered to the skin, therefore decision was made to excise skin with a mass.  Mass was located in subcutaneous layer.  Mass was excised marginally from the subcutaneous tissues. Mass was measured to be 10 mm long by 8 mm wide by 4 mm deep. Mass was sent for pathologic examination. Wound was inspected. There was no evidence of remaining mass.  Wound was irrigated. Deep dermal layer was closed with 4-0 vicryl. Running 4-0 monocryl and exofin glue closed the skin.  A 50-50 mixture of 1% lidocaine without epinephrine and 0.25% bupivacaine without epinephrine was used for local field block.  Wounds were dressed with Xeroform, 4x4s, leonila, and finger sock.   Tourniquet was deflated.  Patient was taken to PACU in stable condition.     I was present for the entire procedure     Postoperative Plan:  Patient may range digits as tolerated.  We will limit weightbearing to a couple of pounds for the first 2 weeks.  We will see patient back at the 2-week postoperative thomas.      Patient Disposition:  PACU         SIGNATURE: Tej Garcia MD  DATE: August 22, 2024  TIME: 10:08 AM

## 2024-08-22 NOTE — ANESTHESIA POSTPROCEDURE EVALUATION
Post-Op Assessment Note            No anethesia notable event occurred.                /91 (08/22/24 1045)    Temp      Pulse 96 (08/22/24 1045)   Resp 18 (08/22/24 1045)    SpO2 96 % (08/22/24 1045)

## 2024-08-22 NOTE — ANESTHESIA PREPROCEDURE EVALUATION
Procedure:  EXCISION MASS - RIGHT HAND (Right: Hand)    EKG( 11/02/21):  NSR, HR 98bpm, Qtc 444    Relevant Problems   CARDIO   (+) Essential hypertension   (+) Hyperlipidemia   (+) Hypertriglyceridemia   (+) Mixed hyperlipidemia      GI/HEPATIC   (+) Gastro-esophageal reflux disease without esophagitis      NEURO/PSYCH   (+) Depression   (+) Generalized anxiety disorder      PULMONARY   (+) Mild intermittent asthma without complication   (+) ALEJANDRA (obstructive sleep apnea)      Behavioral Health   (+) Attention deficit hyperactivity disorder (ADHD), combined type   (+) Bipolar II disorder (HCC)      Other   (+) Morbidly obese (HCC)      Lab Results   Component Value Date    WBC 6.49 03/07/2024    HGB 11.4 (L) 03/07/2024    HCT 34.4 (L) 03/07/2024    MCV 89 03/07/2024     03/07/2024     Lab Results   Component Value Date    SODIUM 139 03/07/2024    K 3.6 03/07/2024     03/07/2024    CO2 26 03/07/2024    AGAP 7 03/07/2024    BUN 13 03/07/2024    CREATININE 0.71 03/07/2024    GLUC 103 03/07/2024    GLUF 108 (H) 11/30/2022    CALCIUM 8.8 03/07/2024    AST 13 03/07/2024    ALT 18 03/07/2024    ALKPHOS 39 03/07/2024    TP 7.1 03/07/2024    TBILI 0.31 03/07/2024    EGFR 100 01/18/2022     Lab Results   Component Value Date    PTT 35 03/07/2024     Lab Results   Component Value Date    INR 1.09 03/07/2024    INR 1.02 12/25/2023    PROTIME 14.7 (H) 03/07/2024    PROTIME 14.0 12/25/2023       Physical Exam    Airway    Mallampati score: III  TM Distance: <3 FB  Neck ROM: full     Dental       Cardiovascular  Rhythm: regular, Rate: normal, Cardiovascular exam normal    Pulmonary  Pulmonary exam normal Breath sounds clear to auscultation    Other Findings        Anesthesia Plan  ASA Score- 3     Anesthesia Type- general with ASA Monitors.         Additional Monitors:     Airway Plan: LMA.           Plan Factors-Exercise tolerance (METS): >4 METS.    Chart reviewed. EKG reviewed. Imaging results reviewed. Existing  labs reviewed. Patient summary reviewed.    Patient is not a current smoker.  Patient did not smoke on day of surgery.    Obstructive sleep apnea risk education given perioperatively.        Induction- intravenous.    Postoperative Plan-         Informed Consent- Anesthetic plan and risks discussed with patient.  I personally reviewed this patient with the CRNA. Discussed and agreed on the Anesthesia Plan with the CRNA..

## 2024-08-22 NOTE — ANESTHESIA POSTPROCEDURE EVALUATION
Post-Op Assessment Note    CV Status:  Stable    Pain management: adequate       Mental Status:  Sleepy   Hydration Status:  Stable   PONV Controlled:  None   Airway Patency:  Patent  There is a medical reason for not screening for obstructive sleep apnea and/or for not using two or more mitigation strategies    Post Op Vitals Reviewed: No      Staff: CRNA               /95 (08/22/24 1027)    Temp (!) 97 °F (36.1 °C) (08/22/24 1027)    Pulse 94 (08/22/24 1027)   Resp 15 (08/22/24 1027)    SpO2 96 % (08/22/24 1027)

## 2024-08-25 DIAGNOSIS — K21.9 GASTRO-ESOPHAGEAL REFLUX DISEASE WITHOUT ESOPHAGITIS: ICD-10-CM

## 2024-08-29 ENCOUNTER — OFFICE VISIT (OUTPATIENT)
Dept: OBGYN CLINIC | Facility: CLINIC | Age: 34
End: 2024-08-29

## 2024-08-29 ENCOUNTER — TELEPHONE (OUTPATIENT)
Age: 34
End: 2024-08-29

## 2024-08-29 VITALS
SYSTOLIC BLOOD PRESSURE: 150 MMHG | DIASTOLIC BLOOD PRESSURE: 85 MMHG | HEIGHT: 63 IN | RESPIRATION RATE: 16 BRPM | HEART RATE: 72 BPM | WEIGHT: 227 LBS | BODY MASS INDEX: 40.22 KG/M2

## 2024-08-29 DIAGNOSIS — Z48.01 DRESSING CHANGE OR REMOVAL, SURGICAL WOUND: Primary | ICD-10-CM

## 2024-08-29 PROCEDURE — 99024 POSTOP FOLLOW-UP VISIT: CPT | Performed by: PHYSICIAN ASSISTANT

## 2024-08-29 NOTE — TELEPHONE ENCOUNTER
Caller: patient    Doctor: michael    Reason for call: Patients dressing got wet while in the shower and now the dressing is coming apart, patient is wondering what to do   Please advise     Call back#: [phone]

## 2024-08-29 NOTE — PROGRESS NOTES
Assessment:   S/P Excision Mass - Right Hand - Right on 8/22/2024    Plan:   Dressing changed.    Follow Up:  As scheduled      CHIEF COMPLAINT:  No chief complaint on file.        SUBJECTIVE:  Stella Sorenson is a 33 y.o. adult who presents for follow up after Excision Mass - Right Hand - Right on 8/22/2024.  Today patient called in stating she accidentally got the dressing wet in the shower and now it is coming apart.  She comes in for a dressing change. No new injury.  No fevers or chills.  No other complaints.      PHYSICAL EXAMINATION:  Vital signs: There were no vitals taken for this visit.  General: well developed and well nourished, alert, oriented times 3, and appears comfortable  Psychiatric: Normal    MUSCULOSKELETAL EXAMINATION:  Incision: Clean, dry, intact  Range of Motion: As expected  Neurovascular status: Neuro intact, good cap refill    Done today: suture ends clipped.  Large bandaid placed      STUDIES REVIEWED:  No Studies to review      PROCEDURES PERFORMED:  Procedures  No Procedures performed today

## 2024-08-30 PROCEDURE — 88305 TISSUE EXAM BY PATHOLOGIST: CPT | Performed by: PATHOLOGY

## 2024-08-30 PROCEDURE — 88342 IMHCHEM/IMCYTCHM 1ST ANTB: CPT | Performed by: PATHOLOGY

## 2024-08-30 PROCEDURE — 88341 IMHCHEM/IMCYTCHM EA ADD ANTB: CPT | Performed by: PATHOLOGY

## 2024-09-03 ENCOUNTER — OFFICE VISIT (OUTPATIENT)
Dept: OBGYN CLINIC | Facility: CLINIC | Age: 34
End: 2024-09-03

## 2024-09-03 VITALS
BODY MASS INDEX: 40.22 KG/M2 | DIASTOLIC BLOOD PRESSURE: 87 MMHG | HEART RATE: 116 BPM | HEIGHT: 63 IN | SYSTOLIC BLOOD PRESSURE: 142 MMHG | WEIGHT: 227 LBS

## 2024-09-03 DIAGNOSIS — Z98.890 POSTOPERATIVE STATE: Primary | ICD-10-CM

## 2024-09-03 PROCEDURE — 99024 POSTOP FOLLOW-UP VISIT: CPT | Performed by: STUDENT IN AN ORGANIZED HEALTH CARE EDUCATION/TRAINING PROGRAM

## 2024-09-03 RX ORDER — SODIUM FLUORIDE 1.1 G/100G
CREAM ORAL
COMMUNITY
Start: 2024-08-14

## 2024-09-03 NOTE — PROGRESS NOTES
Assessment/Plan:  Patient ID: 33 y.o. adult   Surgery: Excision Mass - Right Hand - Right  Date of Surgery: 8/22/2024    Reviewed pathology with patient. Discussed given results with atypia, recommend consultation with orthopedic oncology. Referral was placed to Dr. Smiley.    Follow Up:  PRN    To Do Next Visit:         CHIEF COMPLAINT:  Chief Complaint   Patient presents with    Right Hand - Post-op         SUBJECTIVE:  Patient is a 33 y.o. year old adult who presents for follow up after Excision Mass - Right Hand - Right. Today patient states they are doing well.    Occupation:     PHYSICAL EXAMINATION:  General: Well developed and well nourished, alert and oriented x 3, appears comfortable  Psychiatric: Normal    MUSCULOSKELETAL EXAMINATION:  Incision: Clean, dry, intact  Surgery Site: normal, no evidence of infection   Range of Motion: As expected postoperatively  FPL/EPL intact  Neurovascular status: Neuro intact, good cap refill         STUDIES REVIEWED:  Pathology report reviewed with patient: Granular cell tumor with atypia and mitotic activity      PROCEDURES PERFORMED:  Procedures  No Procedures performed today    Scribe Attestation      I,:  Martine Elkins PA-C am acting as a scribe while in the presence of the attending physician.:       I,:  Tej Garcia MD personally performed the services described in this documentation    as scribed in my presence.:

## 2024-09-04 ENCOUNTER — OFFICE VISIT (OUTPATIENT)
Dept: FAMILY MEDICINE CLINIC | Facility: CLINIC | Age: 34
End: 2024-09-04
Payer: MEDICARE

## 2024-09-04 VITALS
HEART RATE: 105 BPM | DIASTOLIC BLOOD PRESSURE: 80 MMHG | SYSTOLIC BLOOD PRESSURE: 142 MMHG | WEIGHT: 229 LBS | BODY MASS INDEX: 40.57 KG/M2 | OXYGEN SATURATION: 98 % | HEIGHT: 63 IN

## 2024-09-04 DIAGNOSIS — I10 ESSENTIAL HYPERTENSION: ICD-10-CM

## 2024-09-04 DIAGNOSIS — Z13.0 SCREENING FOR DEFICIENCY ANEMIA: ICD-10-CM

## 2024-09-04 DIAGNOSIS — R73.03 PREDIABETES: ICD-10-CM

## 2024-09-04 DIAGNOSIS — F41.1 GENERALIZED ANXIETY DISORDER: ICD-10-CM

## 2024-09-04 DIAGNOSIS — F51.01 PRIMARY INSOMNIA: ICD-10-CM

## 2024-09-04 DIAGNOSIS — Z13.1 SCREENING FOR DIABETES MELLITUS: ICD-10-CM

## 2024-09-04 DIAGNOSIS — E78.2 MIXED HYPERLIPIDEMIA: Primary | ICD-10-CM

## 2024-09-04 DIAGNOSIS — F90.2 ATTENTION DEFICIT HYPERACTIVITY DISORDER (ADHD), COMBINED TYPE: ICD-10-CM

## 2024-09-04 DIAGNOSIS — F31.81 BIPOLAR II DISORDER (HCC): ICD-10-CM

## 2024-09-04 PROCEDURE — 99214 OFFICE O/P EST MOD 30 MIN: CPT | Performed by: FAMILY MEDICINE

## 2024-09-04 RX ORDER — LOSARTAN POTASSIUM 100 MG/1
100 TABLET ORAL DAILY
Qty: 90 TABLET | Refills: 1 | Status: SHIPPED | OUTPATIENT
Start: 2024-09-04

## 2024-09-04 NOTE — PROGRESS NOTES
Outpatient Note- Follow up     HPI:     Stella Sorenson , 33 y.o. genderqueer, neither exclusively male or female  presents today for follow up.  The patient has been following with me for both hyperlipidemia and hypertension.  We have been monitoring her labs fairly regularly in the past and her cholesterol has improved with the start of statin.  She is also on losartan 50 mg daily for blood pressure.  Her BP on presentation today was 140/80.  We discussed the importance of keeping this within a normal range for reduction of risk for future complications.  Lastly the patient is following with me for GERD.  She has been taking Prilosec 20 mg daily which has been improving/keeping her symptoms stable.  She is on multiple medications for mood related issues including ADHD, bipolar type II, history of depression, TOPHER, and insomnia.  She continues on Effexor and Seroquel.    She presents with her newly adopted child today.  She is very happy and is thankful for the opportunity to give her a better life that she had previously.    Past Medical History:   Diagnosis Date    Anxiety     Asthma     Cervical cyst     Current moderate episode of major depressive disorder without prior episode (HCC)     Depression     Fatty liver     Hypertension     Hypertriglyceridemia     Mixed hyperlipidemia     Ovarian cyst     Ovarian cyst     left      ROS:   Review of Systems       OBJECTIVE  Vitals:    09/04/24 1445   BP: 142/80   Pulse: 105   SpO2: 98%        Physical Exam  Constitutional:       General: Stella is not in acute distress.     Appearance: Normal appearance. Stella is obese. Stella is not ill-appearing, toxic-appearing or diaphoretic.   HENT:      Head: Normocephalic and atraumatic.      Right Ear: Tympanic membrane, ear canal and external ear normal. There is no impacted cerumen.      Left Ear: Tympanic membrane, ear canal and external ear normal. There is no impacted cerumen.      Nose: Nose normal. No congestion or  rhinorrhea.      Mouth/Throat:      Mouth: Mucous membranes are moist.      Pharynx: Oropharynx is clear. No oropharyngeal exudate or posterior oropharyngeal erythema.   Cardiovascular:      Rate and Rhythm: Normal rate and regular rhythm.      Heart sounds: Normal heart sounds. No murmur heard.     No friction rub. No gallop.   Pulmonary:      Effort: Pulmonary effort is normal. No respiratory distress.      Breath sounds: Normal breath sounds. No stridor. No wheezing, rhonchi or rales.   Abdominal:      General: Bowel sounds are normal. There is no distension.      Palpations: Abdomen is soft.      Tenderness: There is no abdominal tenderness.   Musculoskeletal:      Cervical back: Neck supple. No tenderness.      Comments: Healing wound on right wrist   Lymphadenopathy:      Cervical: No cervical adenopathy.   Skin:     Capillary Refill: Capillary refill takes less than 2 seconds.   Neurological:      Mental Status: Stella is alert.          ASSESSMENT AND PLAN   Stella was seen today for follow-up.  Diagnoses and all orders for this visit:    Mixed hyperlipidemia  Stable.  Currently on Crestor 5 mg daily.  This significantly improved previous values, and we will continue to have the patient remain on the medication.  If she does lose any weight or improves her diet, we may be able to reduce medication.  -     Lipid panel; Future    Prediabetes  Screening for diabetes mellitus  Patient has history of prediabetes in November 2023, value was 5.8.  Follow-up in March was 5.6.  She will continue to monitor her diet and attempt to reduce carbohydrates and sugars.  -     Comprehensive metabolic panel; Future  -     Hemoglobin A1C; Future    Screening for deficiency anemia  Patient will be due for screening in about 6 months.  Orders placed to review cell lines at follow-up.  -     CBC and differential; Future    Essential hypertension  Increase Cozaar from 50 mg to 100 mg.  Patient on presentation had blood pressure  142/80.  She is to monitor BP at home and we may need to consider switching to more potent ARB or adding medication depending on values.  -     losartan (COZAAR) 100 MG tablet; Take 1 tablet (100 mg total) by mouth daily    Bipolar II disorder (HCC)  Generalized anxiety disorder  Attention deficit hyperactivity disorder (ADHD), combined type  Primary insomnia  Stable.  Currently on Seroquel and Effexor.  Thankfully this has been taking care of her psychiatric issues.  She is no longer following with psych, is obtaining medications through primary care provider.  If she has any significant exacerbations in her symptoms, I would recommend she follow-up with psychiatric provider.      DO Louisa Escalona [facility]  9/5/2024 9:29 AM

## 2024-09-10 DIAGNOSIS — E78.1 HYPERTRIGLYCERIDEMIA: ICD-10-CM

## 2024-09-10 DIAGNOSIS — E78.2 MIXED HYPERLIPIDEMIA: ICD-10-CM

## 2024-09-10 RX ORDER — ROSUVASTATIN CALCIUM 5 MG/1
5 TABLET, COATED ORAL DAILY
Qty: 30 TABLET | Refills: 5 | Status: SHIPPED | OUTPATIENT
Start: 2024-09-10

## 2024-09-12 ENCOUNTER — OFFICE VISIT (OUTPATIENT)
Dept: OBGYN CLINIC | Facility: MEDICAL CENTER | Age: 34
End: 2024-09-12
Payer: MEDICARE

## 2024-09-12 VITALS
BODY MASS INDEX: 40.57 KG/M2 | SYSTOLIC BLOOD PRESSURE: 135 MMHG | WEIGHT: 229 LBS | HEIGHT: 63 IN | DIASTOLIC BLOOD PRESSURE: 92 MMHG | HEART RATE: 102 BPM

## 2024-09-12 DIAGNOSIS — Z01.818 PRE-OP TESTING: ICD-10-CM

## 2024-09-12 DIAGNOSIS — D21.9 GRANULAR CELL TUMOR: Primary | ICD-10-CM

## 2024-09-12 DIAGNOSIS — Z98.890 POSTOPERATIVE STATE: ICD-10-CM

## 2024-09-12 PROCEDURE — 99214 OFFICE O/P EST MOD 30 MIN: CPT | Performed by: STUDENT IN AN ORGANIZED HEALTH CARE EDUCATION/TRAINING PROGRAM

## 2024-09-12 RX ORDER — CEFAZOLIN SODIUM 2 G/50ML
2000 SOLUTION INTRAVENOUS ONCE
OUTPATIENT
Start: 2024-09-12 | End: 2024-09-12

## 2024-09-12 RX ORDER — CHLORHEXIDINE GLUCONATE 40 MG/ML
SOLUTION TOPICAL DAILY PRN
OUTPATIENT
Start: 2024-09-12

## 2024-09-12 NOTE — H&P (VIEW-ONLY)
Orthopedic Oncology Surgery Office Note  Stella Sorenson (33 y.o. adult)  : 1990 Encounter Date: 2024  Dr. Jono Smiley, , Orthopedic Surgeon  Orthopedic Oncology & Sarcoma Surgery   Phone:[phone] Fax:[phone]    Assessment, Plan, & Discussion:   Stella Sorenson is a 33 y.o. adult with:    1.  Granular cell tumor of right hand  - reviewed GCT diagnosis, with both benign and malignant possibility; with her specific pathology somewhere between the two on the spectrum of  malignant potential  - our recommendation at this time would be to remove the area as well as the area around it to establish clean margins  - discussed surgical plan to ellipse out some tissue with a larger scar and surgical area than prior  -Furthermore I reached out to the diagnosing pathologist to administer more information in regards to the granular cell tumor type of benign atypical or malignant.    2. Comorbidity, including: HTN, asthma, ALEJANDRA, ARIAS I, ADHD, BPII, hyperglycemia, obesity, GERD  - continue current management     Surgical Planning:   Surgery Signup: The patient is indicated for surgical intervention with wide excision of right hand granular cell mass. Risks and benefits of the treatment options and surgery were discussed in detail with the patient by Dr. Smiley. The risks of surgery including infection, bleeding, injury to nerves, injury to the vessels, excess scar tissue formation, risk of failure of the procedure, the possible need for further surgery, and potential risk of loss of limb and life. Specific risks to this procedure discussed, including recurrence, need for future surgery, change in diagnosis.  After weighing all the treatment options available, the patient has opted for surgical intervention and informed consent was obtained. We will schedule the patient to be seen back postoperatively.    Pre-op recommendations:   Hold anticoagulation therapy 5-7 days prior to surgery date  Hold  "vitamins/minerals/supplements/ NSAIDs 7 days prior to surgery to decrease bleeding risk.  Hold diabetic medications morning of surgery.  Hold Ace/Arbs/CCB day of surgery  OK to take rest of your medications morning of surgery with small sip of water including pain medication.  NPO night before surgery at midnight  Advised to discuss this with their prescribers as well.    Follow Up & Tasks:     Return for surgery.   ___________________________________________________________________________    History of Present Illness:     Stella Sorenson is a 33 y.o. adult with history of mass on the radial aspect of base of thumb who presents for consultation at the request of Martine Elkins PA-C  regarding pathology resulting from excision of mass 3 weeks ago showing granular cell tumor.     She has had no concerns since excision, healing well.     At baseline patient gaits without assistance.  Denies constitutional symptoms such as fever, chills, night sweats, fatigue, weight gains/losses. Denies  chest pain/shortness of breath.  Patient Denies  personal history of cancer    Review of Systems:   Allergies, medications, past medical/surgical/family/social history have been reviewed.  Complete 12 system review performed and found to be negative except: except as per mentioned in HPI.    Oncology and Treatment History:      Review of referring provider's records:  Referring provider: Martine Elkins PA-C  Date: 9/3/24  Impression: Reviewed pathology with patient. Discussed given results with atypia, recommend consultation with orthopedic oncology. Referral was placed to Dr. Smiley.     Patient Care team:   Patient Care Team:  Semaj Najera DO as PCP - General (Family Medicine)  Huang Hicks DO as PCP - PCP-[facility] ([facility])  Sherry Peralta MD as PCP - PCP-[place] ([facility])     Oncology History    No history exists.       Physical Examination:     Height: 5' 3\" (160 cm)  Weight - Scale: 104 kg (229 lb)  BMI " (Calculated): 40.6  BSA (Calculated - m2): 2.05 sq meters     Vitals:    09/12/24 1533   BP: 135/92   Pulse: 102     Body mass index is 40.57 kg/m².    General: alert and oriented;  nourished/well developed; no apparent distress.   Present with support person  Psychiatric: normal mood and affect  HEENT: NCAT. Head/neck - full range of motion.   Lungs: breathing comfortably; equal symmetric chest expansion.   Abdomen: soft, non-tender, non-distended.   Skin: warm; dry; no lesions, rashes, petechiae or purpura; no clubbing, no cyanosis, no edema, no palpable masses.    Extremity: right hand   Inspection: no edema, skin abnormalities throughout; well healing surgical scar   Palpation: no palpable masses or lesions   Range of motion of joints: WFL range of motion all extremities.   Motor strength: WNL all extremities.  intact..   Sensation: grossly intact to all extremities.    Pulses: intact   Lymphatics: (no obvious) lymphadenopathy  Gait: normal gait.      Imaging Results:   All images personally review today by Dr. Smiley    Study: MRI right thumb w wo  Date: 8/7/24  Report: I have read and agree with the radiologist report. and I have personally reviewed the imaging in PACS and my impression is as follows:  My impression is as follows:   Small, indeterminate, superficial mass along the thenar eminence, as described. Although this could represent a complex sebaceous cyst, further evaluation is warranted. Due to the small and superficial nature of the mass, and ultrasound may be helpful to   assess for solid or cystic components.    Study: XR right hand  Date: 6/28/24  Report: I have read and agree with the radiologist report. and I have personally reviewed the imaging in PACS and my impression is as follows:  My impression is as follows: No acute osseous abnormality.       Pathology & Pertinent Laboratory Findings:      Pathology: FINAL  A. Skin, right hand:  GRANULAR CELL TUMOR WITH ATYPIA AND MITOTIC ACTIVITY  (SEE NOTE)     Note: As there is some degree of cytologic atypia and associated mitoses, the specimen was sent to Dr Chacko for consultation, whose diagnosis is as above (the full report is attached).  The lesion focally extends to the deep margin.  Given the atypical features, it is recommended that a full thickness re-excision of the area with a zone of normal skin be performed.  By immunohistochemistry, CD68 and S100 are positive.      Medical, Surgical, Family, and Social History      Past Medical History:   Diagnosis Date    Anxiety     Asthma     Cervical cyst     Current moderate episode of major depressive disorder without prior episode (HCC)     Depression     Fatty liver     Hypertension     Hypertriglyceridemia     Mixed hyperlipidemia     Ovarian cyst     Ovarian cyst     left     Past Surgical History:   Procedure Laterality Date    UT ESOPHAGOGASTRODUODENOSCOPY TRANSORAL DIAGNOSTIC N/A 4/16/2019    Procedure: ESOPHAGOGASTRODUODENOSCOPY (EGD);  Surgeon: Blake Irby MD;  Location: [facility];  Service: Gastroenterology    UT EXC B9 LESION MRGN XCP SK TG T/A/L 0.6-1.0 CM Right 8/22/2024    Procedure: EXCISION MASS - RIGHT HAND;  Surgeon: Tej Garcia MD;  Location[facility];  Service: Orthopedics    TOOTH EXTRACTION      WISDOM TOOTH EXTRACTION         Current Outpatient Medications:     albuterol (PROVENTIL HFA,VENTOLIN HFA) 90 mcg/act inhaler, Inhale 2 puffs every 6 (six) hours as needed for wheezing, Disp: , Rfl:     Denta 5000 Plus 1.1 % CREA, BRUSH 1 TIME DAILY FOR 2 MINUTES NO FOOD OR DRINKS FOR 30 MINUTES, Disp: , Rfl:     losartan (COZAAR) 100 MG tablet, Take 1 tablet (100 mg total) by mouth daily, Disp: 90 tablet, Rfl: 1    omeprazole (PriLOSEC) 20 mg delayed release capsule, Take 1 capsule by mouth once daily, Disp: 30 capsule, Rfl: 0    rosuvastatin (CRESTOR) 5 mg tablet, Take 1 tablet by mouth once daily, Disp: 30 tablet, Rfl: 5    venlafaxine (EFFEXOR-XR) 150 mg 24 hr  capsule, Take 1 capsule by mouth once daily, Disp: 100 capsule, Rfl: 1    venlafaxine (EFFEXOR-XR) 37.5 mg 24 hr capsule, Take 1 capsule by mouth once daily, Disp: 100 capsule, Rfl: 1    QUEtiapine (SEROquel) 50 mg tablet, Take 0.5 tablets (25 mg total) by mouth daily at bedtime for 7 days, THEN 1 tablet (50 mg total) daily at bedtime., Disp: 90 tablet, Rfl: 0  Allergies   Allergen Reactions    Tylenol [Acetaminophen] Anaphylaxis    Kiwi Extract - Food Allergy Itching    Pineapple Extract - Food Allergy Itching    Latex Rash     Family History   Problem Relation Age of Onset    Hypertension Mother     Arthritis Father     Bipolar disorder Father     Heart disease Father         cardiac disorder    Hypertension Father     Alcohol abuse Father         He has recovered and no longer drinks.    ADD / ADHD Father     Hypertension Sister     No Known Problems Sister     Arthritis Family     Hypertension Family     Hypertension Family     Hypertension Sister     ADD / ADHD Sister     Bipolar disorder Sister     Colon cancer Neg Hx     Ovarian cancer Neg Hx     Breast cancer Neg Hx     Uterine cancer Neg Hx      Social History     Socioeconomic History    Marital status: /Civil Union     Spouse name: Not on file    Number of children: Not on file    Years of education: Not on file    Highest education level: Not on file   Occupational History    Not on file   Tobacco Use    Smoking status: Never    Smokeless tobacco: Never   Vaping Use    Vaping status: Never Used   Substance and Sexual Activity    Alcohol use: Not Currently    Drug use: No    Sexual activity: Yes     Partners: Male     Birth control/protection: None   Other Topics Concern    Not on file   Social History Narrative    Caffeine use     Social Determinants of Health     Financial Resource Strain: High Risk (11/2/2023)    Overall Financial Resource Strain (CARDIA)     Difficulty of Paying Living Expenses: Very hard   Food Insecurity: Food Insecurity  Present (11/2/2023)    Hunger Vital Sign     Worried About Running Out of Food in the Last Year: Often true     Ran Out of Food in the Last Year: Often true   Transportation Needs: Unmet Transportation Needs (11/2/2023)    PRAPARE - Transportation     Lack of Transportation (Medical): Yes     Lack of Transportation (Non-Medical): Yes   Physical Activity: Not on file   Stress: Not on file   Social Connections: Not on file   Intimate Partner Violence: Not on file   Housing Stability: Low Risk  (11/2/2023)    Housing Stability Vital Sign     Unable to Pay for Housing in the Last Year: No     Number of Times Moved in the Last Year: 1     Homeless in the Last Year: No       This Visit:     30 minutes was spent in the coordination of care, reviewing of imaging and with the patient on the date of service    I, Tristan Sahni PA-C, scribed this note in conjunction with and in the presence of Dr. Jono Smiley DO, who performed parts of the services as described in this documentation    Tristan Sahni PA-C   9/12/2024 7:46 PM       Associated Orders:     Problem List Items Addressed This Visit    None  Visit Diagnoses       Granular cell tumor    -  Primary    Relevant Orders    Case request operating room: EXCISION BIOPSY TISSUE LESION/MASS UPPER EXTREMITY (Completed)    Comprehensive metabolic panel    CBC and differential    APTT    Protime-INR    Postoperative state        Pre-op testing        Relevant Orders    Comprehensive metabolic panel    CBC and differential    APTT    Protime-INR

## 2024-09-12 NOTE — PROGRESS NOTES
Orthopedic Oncology Surgery Office Note  Stella Sorenson (33 y.o. adult)  : 1990 Encounter Date: 2024  Dr. Jono Smiley, , Orthopedic Surgeon  Orthopedic Oncology & Sarcoma Surgery   Phone:[phone] Fax:[phone]    Assessment, Plan, & Discussion:   Stella Sorenson is a 33 y.o. adult with:    1.  Granular cell tumor of right hand  - reviewed GCT diagnosis, with both benign and malignant possibility; with her specific pathology somewhere between the two on the spectrum of  malignant potential  - our recommendation at this time would be to remove the area as well as the area around it to establish clean margins  - discussed surgical plan to ellipse out some tissue with a larger scar and surgical area than prior  -Furthermore I reached out to the diagnosing pathologist to administer more information in regards to the granular cell tumor type of benign atypical or malignant.    2. Comorbidity, including: HTN, asthma, ALEJANDRA, ARIAS I, ADHD, BPII, hyperglycemia, obesity, GERD  - continue current management     Surgical Planning:   Surgery Signup: The patient is indicated for surgical intervention with wide excision of right hand granular cell mass. Risks and benefits of the treatment options and surgery were discussed in detail with the patient by Dr. Smiley. The risks of surgery including infection, bleeding, injury to nerves, injury to the vessels, excess scar tissue formation, risk of failure of the procedure, the possible need for further surgery, and potential risk of loss of limb and life. Specific risks to this procedure discussed, including recurrence, need for future surgery, change in diagnosis.  After weighing all the treatment options available, the patient has opted for surgical intervention and informed consent was obtained. We will schedule the patient to be seen back postoperatively.    Pre-op recommendations:   Hold anticoagulation therapy 5-7 days prior to surgery date  Hold  "vitamins/minerals/supplements/ NSAIDs 7 days prior to surgery to decrease bleeding risk.  Hold diabetic medications morning of surgery.  Hold Ace/Arbs/CCB day of surgery  OK to take rest of your medications morning of surgery with small sip of water including pain medication.  NPO night before surgery at midnight  Advised to discuss this with their prescribers as well.    Follow Up & Tasks:     Return for surgery.   ___________________________________________________________________________    History of Present Illness:     Stella Sorenson is a 33 y.o. adult with history of mass on the radial aspect of base of thumb who presents for consultation at the request of Martine Elkins PA-C  regarding pathology resulting from excision of mass 3 weeks ago showing granular cell tumor.     She has had no concerns since excision, healing well.     At baseline patient gaits without assistance.  Denies constitutional symptoms such as fever, chills, night sweats, fatigue, weight gains/losses. Denies  chest pain/shortness of breath.  Patient Denies  personal history of cancer    Review of Systems:   Allergies, medications, past medical/surgical/family/social history have been reviewed.  Complete 12 system review performed and found to be negative except: except as per mentioned in HPI.    Oncology and Treatment History:      Review of referring provider's records:  Referring provider: Martine Elkins PA-C  Date: 9/3/24  Impression: Reviewed pathology with patient. Discussed given results with atypia, recommend consultation with orthopedic oncology. Referral was placed to Dr. Smiley.     Patient Care team:   Patient Care Team:  Semaj Najera DO as PCP - General (Family Medicine)  Huang Hicks DO as PCP - PCP-[facility])  Sherry Peralta MD as PCP - PCP-[facility] ([facility])     Oncology History    No history exists.       Physical Examination:     Height: 5' 3\" (160 cm)  Weight - Scale: 104 kg (229 lb)  BMI " (Calculated): 40.6  BSA (Calculated - m2): 2.05 sq meters     Vitals:    09/12/24 1533   BP: 135/92   Pulse: 102     Body mass index is 40.57 kg/m².    General: alert and oriented;  nourished/well developed; no apparent distress.   Present with support person  Psychiatric: normal mood and affect  HEENT: NCAT. Head/neck - full range of motion.   Lungs: breathing comfortably; equal symmetric chest expansion.   Abdomen: soft, non-tender, non-distended.   Skin: warm; dry; no lesions, rashes, petechiae or purpura; no clubbing, no cyanosis, no edema, no palpable masses.    Extremity: right hand   Inspection: no edema, skin abnormalities throughout; well healing surgical scar   Palpation: no palpable masses or lesions   Range of motion of joints: WFL range of motion all extremities.   Motor strength: WNL all extremities.  intact..   Sensation: grossly intact to all extremities.    Pulses: intact   Lymphatics: (no obvious) lymphadenopathy  Gait: normal gait.      Imaging Results:   All images personally review today by Dr. Smiley    Study: MRI right thumb w wo  Date: 8/7/24  Report: I have read and agree with the radiologist report. and I have personally reviewed the imaging in PACS and my impression is as follows:  My impression is as follows:   Small, indeterminate, superficial mass along the thenar eminence, as described. Although this could represent a complex sebaceous cyst, further evaluation is warranted. Due to the small and superficial nature of the mass, and ultrasound may be helpful to   assess for solid or cystic components.    Study: XR right hand  Date: 6/28/24  Report: I have read and agree with the radiologist report. and I have personally reviewed the imaging in PACS and my impression is as follows:  My impression is as follows: No acute osseous abnormality.       Pathology & Pertinent Laboratory Findings:      Pathology: FINAL  A. Skin, right hand:  GRANULAR CELL TUMOR WITH ATYPIA AND MITOTIC ACTIVITY  (SEE NOTE)     Note: As there is some degree of cytologic atypia and associated mitoses, the specimen was sent to Dr Chacko for consultation, whose diagnosis is as above (the full report is attached).  The lesion focally extends to the deep margin.  Given the atypical features, it is recommended that a full thickness re-excision of the area with a zone of normal skin be performed.  By immunohistochemistry, CD68 and S100 are positive.      Medical, Surgical, Family, and Social History      Past Medical History:   Diagnosis Date    Anxiety     Asthma     Cervical cyst     Current moderate episode of major depressive disorder without prior episode (HCC)     Depression     Fatty liver     Hypertension     Hypertriglyceridemia     Mixed hyperlipidemia     Ovarian cyst     Ovarian cyst     left     Past Surgical History:   Procedure Laterality Date    [place] ESOPHAGOGASTRODUODENOSCOPY TRANSORAL DIAGNOSTIC N/A 4/16/2019    Procedure: ESOPHAGOGASTRODUODENOSCOPY (EGD);  Surgeon: Blake Irby MD;  Location: [facility];  Service: Gastroenterology    [place] EXC B9 LESION MRGN XCP SK TG T/A/L 0.6-1.0 CM Right 8/22/2024    Procedure: EXCISION MASS - RIGHT HAND;  Surgeon: Tej Garcia MD;  [facility];  Service: Orthopedics    TOOTH EXTRACTION      WISDOM TOOTH EXTRACTION         Current Outpatient Medications:     albuterol (PROVENTIL HFA,VENTOLIN HFA) 90 mcg/act inhaler, Inhale 2 puffs every 6 (six) hours as needed for wheezing, Disp: , Rfl:     Denta 5000 Plus 1.1 % CREA, BRUSH 1 TIME DAILY FOR 2 MINUTES NO FOOD OR DRINKS FOR 30 MINUTES, Disp: , Rfl:     losartan (COZAAR) 100 MG tablet, Take 1 tablet (100 mg total) by mouth daily, Disp: 90 tablet, Rfl: 1    omeprazole (PriLOSEC) 20 mg delayed release capsule, Take 1 capsule by mouth once daily, Disp: 30 capsule, Rfl: 0    rosuvastatin (CRESTOR) 5 mg tablet, Take 1 tablet by mouth once daily, Disp: 30 tablet, Rfl: 5    venlafaxine (EFFEXOR-XR) 150 mg 24 hr  capsule, Take 1 capsule by mouth once daily, Disp: 100 capsule, Rfl: 1    venlafaxine (EFFEXOR-XR) 37.5 mg 24 hr capsule, Take 1 capsule by mouth once daily, Disp: 100 capsule, Rfl: 1    QUEtiapine (SEROquel) 50 mg tablet, Take 0.5 tablets (25 mg total) by mouth daily at bedtime for 7 days, THEN 1 tablet (50 mg total) daily at bedtime., Disp: 90 tablet, Rfl: 0  Allergies   Allergen Reactions    Tylenol [Acetaminophen] Anaphylaxis    Kiwi Extract - Food Allergy Itching    Pineapple Extract - Food Allergy Itching    Latex Rash     Family History   Problem Relation Age of Onset    Hypertension Mother     Arthritis Father     Bipolar disorder Father     Heart disease Father         cardiac disorder    Hypertension Father     Alcohol abuse Father         He has recovered and no longer drinks.    ADD / ADHD Father     Hypertension Sister     No Known Problems Sister     Arthritis Family     Hypertension Family     Hypertension Family     Hypertension Sister     ADD / ADHD Sister     Bipolar disorder Sister     Colon cancer Neg Hx     Ovarian cancer Neg Hx     Breast cancer Neg Hx     Uterine cancer Neg Hx      Social History     Socioeconomic History    Marital status: /Civil Union     Spouse name: Not on file    Number of children: Not on file    Years of education: Not on file    Highest education level: Not on file   Occupational History    Not on file   Tobacco Use    Smoking status: Never    Smokeless tobacco: Never   Vaping Use    Vaping status: Never Used   Substance and Sexual Activity    Alcohol use: Not Currently    Drug use: No    Sexual activity: Yes     Partners: Male     Birth control/protection: None   Other Topics Concern    Not on file   Social History Narrative    Caffeine use     Social Determinants of Health     Financial Resource Strain: High Risk (11/2/2023)    Overall Financial Resource Strain (CARDIA)     Difficulty of Paying Living Expenses: Very hard   Food Insecurity: Food Insecurity  Present (11/2/2023)    Hunger Vital Sign     Worried About Running Out of Food in the Last Year: Often true     Ran Out of Food in the Last Year: Often true   Transportation Needs: Unmet Transportation Needs (11/2/2023)    PRAPARE - Transportation     Lack of Transportation (Medical): Yes     Lack of Transportation (Non-Medical): Yes   Physical Activity: Not on file   Stress: Not on file   Social Connections: Not on file   Intimate Partner Violence: Not on file   Housing Stability: Low Risk  (11/2/2023)    Housing Stability Vital Sign     Unable to Pay for Housing in the Last Year: No     Number of Times Moved in the Last Year: 1     Homeless in the Last Year: No       This Visit:     30 minutes was spent in the coordination of care, reviewing of imaging and with the patient on the date of service    I, Tristan Sahni PA-C, scribed this note in conjunction with and in the presence of Dr. Jono Smiley DO, who performed parts of the services as described in this documentation    Tristan Sahni PA-C   9/12/2024 7:46 PM       Associated Orders:     Problem List Items Addressed This Visit    None  Visit Diagnoses       Granular cell tumor    -  Primary    Relevant Orders    Case request operating room: EXCISION BIOPSY TISSUE LESION/MASS UPPER EXTREMITY (Completed)    Comprehensive metabolic panel    CBC and differential    APTT    Protime-INR    Postoperative state        Pre-op testing        Relevant Orders    Comprehensive metabolic panel    CBC and differential    APTT    Protime-INR

## 2024-09-23 DIAGNOSIS — F39 UNSPECIFIED MOOD (AFFECTIVE) DISORDER (HCC): ICD-10-CM

## 2024-09-23 NOTE — PRE-PROCEDURE INSTRUCTIONS
Pre-Surgery Instructions:   Medication Instructions    albuterol (PROVENTIL HFA,VENTOLIN HFA) 90 mcg/act inhaler Uses PRN- OK to take day of surgery    losartan (COZAAR) 100 MG tablet Take night before surgery    omeprazole (PriLOSEC) 20 mg delayed release capsule Take day of surgery.    QUEtiapine (SEROquel) 50 mg tablet Take night before surgery    rosuvastatin (CRESTOR) 5 mg tablet Take night before surgery    venlafaxine (EFFEXOR-XR) 150 mg 24 hr capsule Take day of surgery.    venlafaxine (EFFEXOR-XR) 37.5 mg 24 hr capsule Take day of surgery.     Medication instructions for day surgery reviewed. Please use only a sip of water to take your instructed medications. Avoid all over the counter vitamins, supplements and NSAIDS for one week prior to surgery per anesthesia guidelines. Tylenol is ok to take as needed.     You will receive a call one business day prior to surgery with an arrival time and hospital directions. If your surgery is scheduled on a Monday, the hospital will be calling you on the Friday prior to your surgery. If you have not heard from anyone by 8pm, please call the hospital supervisor through the hospital  at [phone]. ([place] [phone] or [place] [phone]).    Do not eat or drink anything after midnight the night before your surgery, including candy, mints, lifesavers, or chewing gum. Do not drink alcohol 24hrs before your surgery. Try not to smoke at least 24hrs before your surgery.       Follow the pre surgery showering instructions as listed in the “My Surgical Experience Booklet” or otherwise provided by your surgeon's office. Do not use a blade to shave the surgical area 1 week before surgery. It is okay to use a clean electric clippers up to 24 hours before surgery. Do not apply any lotions, creams, including makeup, cologne, deodorant, or perfumes after showering on the day of your surgery. Do not use dry shampoo, hair spray, hair gel, or any type of hair  products.     No contact lenses, eye make-up, or artificial eyelashes. Remove nail polish, including gel polish, and any artificial, gel, or acrylic nails if possible. Remove all jewelry including rings and body piercing jewelry.     Wear causal clothing that is easy to take on and off. Consider your type of surgery.    Keep any valuables, jewelry, piercings at home. Please bring any specially ordered equipment (sling, braces) if indicated.    Arrange for a responsible person to drive you to and from the hospital on the day of your surgery. Please confirm the visitor policy for the day of your procedure when you receive your phone call with an arrival time.     Call the surgeon's office with any new illnesses, exposures, or additional questions prior to surgery.    Please reference your “My Surgical Experience Booklet” for additional information to prepare for your upcoming surgery.

## 2024-09-24 ENCOUNTER — APPOINTMENT (OUTPATIENT)
Dept: LAB | Facility: CLINIC | Age: 34
End: 2024-09-24
Payer: MEDICARE

## 2024-09-24 DIAGNOSIS — Z01.818 PRE-OP TESTING: ICD-10-CM

## 2024-09-24 DIAGNOSIS — D21.9 GRANULAR CELL TUMOR: ICD-10-CM

## 2024-09-24 DIAGNOSIS — R22.31 MASS OF RIGHT HAND: ICD-10-CM

## 2024-09-24 LAB
ALBUMIN SERPL BCG-MCNC: 4.4 G/DL (ref 3.5–5)
ALP SERPL-CCNC: 38 U/L (ref 34–104)
ALT SERPL W P-5'-P-CCNC: 28 U/L (ref 7–52)
ANION GAP SERPL CALCULATED.3IONS-SCNC: 10 MMOL/L (ref 4–13)
APTT PPP: 30 SECONDS (ref 23–34)
AST SERPL W P-5'-P-CCNC: 20 U/L (ref 5–45)
BASOPHILS # BLD AUTO: 0.04 THOUSANDS/ΜL (ref 0–0.1)
BASOPHILS NFR BLD AUTO: 1 % (ref 0–1)
BILIRUB SERPL-MCNC: 0.33 MG/DL (ref 0.2–1)
BUN SERPL-MCNC: 13 MG/DL (ref 5–25)
CALCIUM SERPL-MCNC: 9.1 MG/DL (ref 8.4–10.2)
CHLORIDE SERPL-SCNC: 103 MMOL/L (ref 96–108)
CO2 SERPL-SCNC: 25 MMOL/L (ref 21–32)
CREAT SERPL-MCNC: 0.63 MG/DL (ref 0.6–1.3)
EOSINOPHIL # BLD AUTO: 0.21 THOUSAND/ΜL (ref 0–0.61)
EOSINOPHIL NFR BLD AUTO: 3 % (ref 0–6)
ERYTHROCYTE [DISTWIDTH] IN BLOOD BY AUTOMATED COUNT: 13.9 % (ref 11.6–15.1)
GLUCOSE P FAST SERPL-MCNC: 129 MG/DL (ref 65–99)
HCT VFR BLD AUTO: 35.1 % (ref 36.5–46.1)
HGB BLD-MCNC: 11.4 G/DL (ref 12–15.4)
IMM GRANULOCYTES # BLD AUTO: 0.03 THOUSAND/UL (ref 0–0.2)
IMM GRANULOCYTES NFR BLD AUTO: 0 % (ref 0–2)
INR PPP: 1.04 (ref 0.85–1.19)
LYMPHOCYTES # BLD AUTO: 1.79 THOUSANDS/ΜL (ref 0.6–4.47)
LYMPHOCYTES NFR BLD AUTO: 27 % (ref 14–44)
MCH RBC QN AUTO: 28.4 PG (ref 26.8–34.3)
MCHC RBC AUTO-ENTMCNC: 32.5 G/DL (ref 31.4–37.4)
MCV RBC AUTO: 87 FL (ref 82–98)
MONOCYTES # BLD AUTO: 0.3 THOUSAND/ΜL (ref 0.17–1.22)
MONOCYTES NFR BLD AUTO: 5 % (ref 4–12)
NEUTROPHILS # BLD AUTO: 4.31 THOUSANDS/ΜL (ref 1.85–7.62)
NEUTS SEG NFR BLD AUTO: 64 % (ref 43–75)
NRBC BLD AUTO-RTO: 0 /100 WBCS
PLATELET # BLD AUTO: 317 THOUSANDS/UL (ref 149–390)
PMV BLD AUTO: 11.4 FL (ref 8.9–12.7)
POTASSIUM SERPL-SCNC: 4.1 MMOL/L (ref 3.5–5.3)
PROT SERPL-MCNC: 7.2 G/DL (ref 6.4–8.4)
PROTHROMBIN TIME: 14 SECONDS (ref 12.3–15)
RBC # BLD AUTO: 4.02 MILLION/UL (ref 3.88–5.12)
SODIUM SERPL-SCNC: 138 MMOL/L (ref 135–147)
WBC # BLD AUTO: 6.68 THOUSAND/UL (ref 4.31–10.16)

## 2024-09-24 PROCEDURE — 80053 COMPREHEN METABOLIC PANEL: CPT

## 2024-09-24 PROCEDURE — 85025 COMPLETE CBC W/AUTO DIFF WBC: CPT

## 2024-09-24 PROCEDURE — 85610 PROTHROMBIN TIME: CPT

## 2024-09-24 PROCEDURE — 85730 THROMBOPLASTIN TIME PARTIAL: CPT

## 2024-09-24 PROCEDURE — 36415 COLL VENOUS BLD VENIPUNCTURE: CPT

## 2024-09-24 RX ORDER — QUETIAPINE FUMARATE 50 MG/1
TABLET, FILM COATED ORAL
Qty: 90 TABLET | Refills: 1 | Status: SHIPPED | OUTPATIENT
Start: 2024-09-24

## 2024-09-30 ENCOUNTER — ANESTHESIA EVENT (OUTPATIENT)
Dept: PERIOP | Facility: HOSPITAL | Age: 34
End: 2024-09-30
Payer: MEDICARE

## 2024-10-01 ENCOUNTER — OFFICE VISIT (OUTPATIENT)
Dept: OBGYN CLINIC | Facility: CLINIC | Age: 34
End: 2024-10-01

## 2024-10-01 VITALS
DIASTOLIC BLOOD PRESSURE: 82 MMHG | HEART RATE: 102 BPM | RESPIRATION RATE: 18 BRPM | BODY MASS INDEX: 40.57 KG/M2 | SYSTOLIC BLOOD PRESSURE: 120 MMHG | HEIGHT: 63 IN | WEIGHT: 229 LBS

## 2024-10-01 DIAGNOSIS — N96 RECURRENT PREGNANCY LOSS: Primary | ICD-10-CM

## 2024-10-02 ENCOUNTER — ANESTHESIA (OUTPATIENT)
Dept: PERIOP | Facility: HOSPITAL | Age: 34
End: 2024-10-02
Payer: MEDICARE

## 2024-10-02 ENCOUNTER — HOSPITAL ENCOUNTER (OUTPATIENT)
Facility: HOSPITAL | Age: 34
Setting detail: OUTPATIENT SURGERY
Discharge: HOME | End: 2024-10-02
Attending: STUDENT IN AN ORGANIZED HEALTH CARE EDUCATION/TRAINING PROGRAM | Admitting: STUDENT IN AN ORGANIZED HEALTH CARE EDUCATION/TRAINING PROGRAM
Payer: MEDICARE

## 2024-10-02 VITALS
OXYGEN SATURATION: 97 % | TEMPERATURE: 97.6 F | DIASTOLIC BLOOD PRESSURE: 64 MMHG | HEIGHT: 63 IN | WEIGHT: 225.75 LBS | HEART RATE: 116 BPM | BODY MASS INDEX: 40 KG/M2 | SYSTOLIC BLOOD PRESSURE: 115 MMHG | RESPIRATION RATE: 18 BRPM

## 2024-10-02 DIAGNOSIS — D21.9 GRANULAR CELL TUMOR: Primary | ICD-10-CM

## 2024-10-02 LAB
EXT PREGNANCY TEST URINE: NEGATIVE
EXT. CONTROL: NORMAL

## 2024-10-02 PROCEDURE — 26113 EXC HAND TUM DEEP 1.5 CM/>: CPT | Performed by: STUDENT IN AN ORGANIZED HEALTH CARE EDUCATION/TRAINING PROGRAM

## 2024-10-02 PROCEDURE — 88305 TISSUE EXAM BY PATHOLOGIST: CPT | Performed by: PATHOLOGY

## 2024-10-02 PROCEDURE — 88341 IMHCHEM/IMCYTCHM EA ADD ANTB: CPT | Performed by: PATHOLOGY

## 2024-10-02 PROCEDURE — 88342 IMHCHEM/IMCYTCHM 1ST ANTB: CPT | Performed by: PATHOLOGY

## 2024-10-02 PROCEDURE — 81025 URINE PREGNANCY TEST: CPT | Performed by: STUDENT IN AN ORGANIZED HEALTH CARE EDUCATION/TRAINING PROGRAM

## 2024-10-02 RX ORDER — SODIUM CHLORIDE 9 MG/ML
125 INJECTION, SOLUTION INTRAVENOUS CONTINUOUS
Status: DISCONTINUED | OUTPATIENT
Start: 2024-10-02 | End: 2024-10-02 | Stop reason: HOSPADM

## 2024-10-02 RX ORDER — CHLORHEXIDINE GLUCONATE 40 MG/ML
SOLUTION TOPICAL DAILY PRN
Status: DISCONTINUED | OUTPATIENT
Start: 2024-10-02 | End: 2024-10-02 | Stop reason: HOSPADM

## 2024-10-02 RX ORDER — ONDANSETRON 2 MG/ML
INJECTION INTRAMUSCULAR; INTRAVENOUS AS NEEDED
Status: DISCONTINUED | OUTPATIENT
Start: 2024-10-02 | End: 2024-10-02

## 2024-10-02 RX ORDER — LIDOCAINE HYDROCHLORIDE AND EPINEPHRINE 10; 10 MG/ML; UG/ML
INJECTION, SOLUTION INFILTRATION; PERINEURAL AS NEEDED
Status: DISCONTINUED | OUTPATIENT
Start: 2024-10-02 | End: 2024-10-02 | Stop reason: HOSPADM

## 2024-10-02 RX ORDER — LIDOCAINE HYDROCHLORIDE 20 MG/ML
INJECTION, SOLUTION EPIDURAL; INFILTRATION; INTRACAUDAL; PERINEURAL AS NEEDED
Status: DISCONTINUED | OUTPATIENT
Start: 2024-10-02 | End: 2024-10-02

## 2024-10-02 RX ORDER — KETOROLAC TROMETHAMINE 30 MG/ML
INJECTION, SOLUTION INTRAMUSCULAR; INTRAVENOUS AS NEEDED
Status: DISCONTINUED | OUTPATIENT
Start: 2024-10-02 | End: 2024-10-02

## 2024-10-02 RX ORDER — MIDAZOLAM HYDROCHLORIDE 2 MG/2ML
INJECTION, SOLUTION INTRAMUSCULAR; INTRAVENOUS AS NEEDED
Status: DISCONTINUED | OUTPATIENT
Start: 2024-10-02 | End: 2024-10-02

## 2024-10-02 RX ORDER — DIPHENOXYLATE HYDROCHLORIDE AND ATROPINE SULFATE 2.5; .025 MG/1; MG/1
1 TABLET ORAL DAILY
COMMUNITY

## 2024-10-02 RX ORDER — CEFAZOLIN SODIUM 2 G/50ML
2000 SOLUTION INTRAVENOUS ONCE
Status: COMPLETED | OUTPATIENT
Start: 2024-10-02 | End: 2024-10-02

## 2024-10-02 RX ORDER — ONDANSETRON 2 MG/ML
4 INJECTION INTRAMUSCULAR; INTRAVENOUS EVERY 6 HOURS PRN
Status: DISCONTINUED | OUTPATIENT
Start: 2024-10-02 | End: 2024-10-02 | Stop reason: HOSPADM

## 2024-10-02 RX ORDER — CETIRIZINE HYDROCHLORIDE 10 MG/1
10 TABLET ORAL DAILY
COMMUNITY

## 2024-10-02 RX ORDER — FENTANYL CITRATE 50 UG/ML
INJECTION, SOLUTION INTRAMUSCULAR; INTRAVENOUS AS NEEDED
Status: DISCONTINUED | OUTPATIENT
Start: 2024-10-02 | End: 2024-10-02

## 2024-10-02 RX ORDER — TRAMADOL HYDROCHLORIDE 50 MG/1
50 TABLET ORAL EVERY 6 HOURS SCHEDULED
Status: DISCONTINUED | OUTPATIENT
Start: 2024-10-02 | End: 2024-10-02 | Stop reason: HOSPADM

## 2024-10-02 RX ORDER — PROPOFOL 10 MG/ML
INJECTION, EMULSION INTRAVENOUS AS NEEDED
Status: DISCONTINUED | OUTPATIENT
Start: 2024-10-02 | End: 2024-10-02

## 2024-10-02 RX ORDER — TRAMADOL HYDROCHLORIDE 50 MG/1
50 TABLET ORAL EVERY 6 HOURS PRN
Qty: 40 TABLET | Refills: 0 | Status: SHIPPED | OUTPATIENT
Start: 2024-10-02 | End: 2024-10-12

## 2024-10-02 RX ORDER — MEPERIDINE HYDROCHLORIDE 25 MG/ML
12.5 INJECTION INTRAMUSCULAR; INTRAVENOUS; SUBCUTANEOUS
Status: DISCONTINUED | OUTPATIENT
Start: 2024-10-02 | End: 2024-10-02 | Stop reason: HOSPADM

## 2024-10-02 RX ORDER — DEXAMETHASONE SODIUM PHOSPHATE 10 MG/ML
INJECTION, SOLUTION INTRAMUSCULAR; INTRAVENOUS AS NEEDED
Status: DISCONTINUED | OUTPATIENT
Start: 2024-10-02 | End: 2024-10-02

## 2024-10-02 RX ORDER — MAGNESIUM HYDROXIDE 1200 MG/15ML
LIQUID ORAL AS NEEDED
Status: DISCONTINUED | OUTPATIENT
Start: 2024-10-02 | End: 2024-10-02 | Stop reason: HOSPADM

## 2024-10-02 RX ORDER — CELECOXIB 100 MG/1
100 CAPSULE ORAL 2 TIMES DAILY
Qty: 28 CAPSULE | Refills: 0 | Status: SHIPPED | OUTPATIENT
Start: 2024-10-02 | End: 2024-10-16

## 2024-10-02 RX ORDER — HYDROMORPHONE HCL/PF 1 MG/ML
0.5 SYRINGE (ML) INJECTION
Status: DISCONTINUED | OUTPATIENT
Start: 2024-10-02 | End: 2024-10-02 | Stop reason: HOSPADM

## 2024-10-02 RX ORDER — ONDANSETRON 4 MG/1
4 TABLET, ORALLY DISINTEGRATING ORAL EVERY 6 HOURS PRN
Qty: 15 TABLET | Refills: 0 | Status: SHIPPED | OUTPATIENT
Start: 2024-10-02

## 2024-10-02 RX ORDER — ONDANSETRON 2 MG/ML
4 INJECTION INTRAMUSCULAR; INTRAVENOUS ONCE AS NEEDED
Status: DISCONTINUED | OUTPATIENT
Start: 2024-10-02 | End: 2024-10-02 | Stop reason: HOSPADM

## 2024-10-02 RX ORDER — FENTANYL CITRATE/PF 50 MCG/ML
25 SYRINGE (ML) INJECTION
Status: DISCONTINUED | OUTPATIENT
Start: 2024-10-02 | End: 2024-10-02 | Stop reason: HOSPADM

## 2024-10-02 RX ORDER — SENNA AND DOCUSATE SODIUM 50; 8.6 MG/1; MG/1
1 TABLET, FILM COATED ORAL DAILY
Qty: 14 TABLET | Refills: 0 | Status: SHIPPED | OUTPATIENT
Start: 2024-10-02 | End: 2024-10-16

## 2024-10-02 RX ORDER — BUPIVACAINE HYDROCHLORIDE AND EPINEPHRINE 2.5; 5 MG/ML; UG/ML
INJECTION, SOLUTION EPIDURAL; INFILTRATION; INTRACAUDAL; PERINEURAL AS NEEDED
Status: DISCONTINUED | OUTPATIENT
Start: 2024-10-02 | End: 2024-10-02 | Stop reason: HOSPADM

## 2024-10-02 RX ADMIN — ONDANSETRON 4 MG: 2 INJECTION INTRAMUSCULAR; INTRAVENOUS at 13:13

## 2024-10-02 RX ADMIN — PROPOFOL 50 MG: 10 INJECTION, EMULSION INTRAVENOUS at 12:41

## 2024-10-02 RX ADMIN — DEXAMETHASONE SODIUM PHOSPHATE 10 MG: 10 INJECTION INTRAMUSCULAR; INTRAVENOUS at 12:21

## 2024-10-02 RX ADMIN — SODIUM CHLORIDE 125 ML/HR: 0.9 INJECTION, SOLUTION INTRAVENOUS at 10:02

## 2024-10-02 RX ADMIN — LIDOCAINE HYDROCHLORIDE 100 MG: 20 INJECTION, SOLUTION EPIDURAL; INFILTRATION; INTRACAUDAL at 12:21

## 2024-10-02 RX ADMIN — PROPOFOL 200 MG: 10 INJECTION, EMULSION INTRAVENOUS at 12:21

## 2024-10-02 RX ADMIN — MIDAZOLAM HYDROCHLORIDE 2 MG: 1 INJECTION, SOLUTION INTRAMUSCULAR; INTRAVENOUS at 12:19

## 2024-10-02 RX ADMIN — SODIUM CHLORIDE 125 ML/HR: 0.9 INJECTION, SOLUTION INTRAVENOUS at 13:45

## 2024-10-02 RX ADMIN — FENTANYL CITRATE 50 MCG: 50 INJECTION, SOLUTION INTRAMUSCULAR; INTRAVENOUS at 12:24

## 2024-10-02 RX ADMIN — FENTANYL CITRATE 50 MCG: 50 INJECTION, SOLUTION INTRAMUSCULAR; INTRAVENOUS at 12:41

## 2024-10-02 RX ADMIN — CEFAZOLIN SODIUM 2000 MG: 2 SOLUTION INTRAVENOUS at 12:19

## 2024-10-02 RX ADMIN — KETOROLAC TROMETHAMINE 15 MG: 30 INJECTION, SOLUTION INTRAMUSCULAR; INTRAVENOUS at 13:17

## 2024-10-02 NOTE — DISCHARGE INSTR - AVS FIRST PAGE
Discharge Instructions  Dr. Jono Smiley DO, Orthopedic Surgeon  [facility]   Phone:[phone] Fax:[phone]    What to Expect/Activity  It is normal to have some discomfort in the surgical area for several days.  For the next 48 hours use ice packs around your surgical limb,  20-30 minutes every 1-2 hours while you are awake.  Elevate your surgical leg by elevating with pillows as much as possible  You are weight bearing as tolerated  to your operative extremity.    Dressing/Wound Care/Bathing  DO NOT remove your ACE dressing  Do not stick anything down your dressing to itch. Benadryl, ice and light pressure through the bandage can help alleviate itching.  When showering please wrap the ACE wrap in either a cast bag or trash bag secured to prevent the splint from getting wet. Please call the office if the splint becomes wet or dirty.  No baths, swimming or submerging until discussed at your follow-up appointment.     Pain Management/Medications  You may resume your usual home medications unless instructed otherwise.  You have been prescribed several medications. Take as directed on the instructions. If you experience any adverse effects, stop taking them immediately and call the office for additional instructions.   Walk around every few hours. This is to prevent blood clots after surgery.   Narcotic pain medication: You may have been prescribed a strong narcotic medication. Take this according to the pharmacy instructions, only if needed  Stool Softeners (senna/colace): take daily while taking narcotic pain medication to help prevent constipation     Follow up/Call if:  The findings of your surgery will be explained to you and your family immediately after surgery. However, in the post-operative period, during recovery from anesthesia you may not fully remember or fully understand what was said. This will be explained once again when you return for your post-op appointment.    You should call to schedule a post-operative appointment in the office 10-14 days from the date of surgery if not already scheduled.   If you experience fever over 101 degrees, excessive bleeding, persistent nausea or vomiting, decreased sensation or discoloration of the operative leg, or severe uncontrollable pain please contact Dr. Smiley's office immediately.     Dr. Smiley's Office Contact: [phone]      Post Operative Instructions    You have had surgery on your arm today, please read and follow the information below:  Do not apply any cream/ointment/oil to your incisions including antibiotics.  Do not soak your hands in standing water (dishwater, tubs, Jacuzzi's, pools, etc.) until given permission (typically 2-3 weeks after injury)    Call the office if you notice any:  Increased numbness or tingling of your hand or fingers that is not relieved with elevation.  Increasing pain that is not controlled with medication.  Difficulty chewing, breathing, swallowing.  Chest pains or shortness of breath.  Fever over 101.4 degrees.    Bandage: Do NOT remove bandage until follow-up appointment.    Motion: Move fingers into a fist 5 times a day, DO NOT move any splinted fingers.    Weight bearing status: Avoid heavy lifting (>5 pounds) with the extremity that was operated on until follow up appointment. Normal activities of daily living are OK.    Ice: Ice for 10 minutes every hour as needed for swelling x 24 hours.    Sling: No sling necessary.    Medications:   Take as prescribed, as needed     Follow-up Appointment: 10-14 days.      Please call the office if you have any questions or concerns regarding your post-operative care.

## 2024-10-02 NOTE — ANESTHESIA POSTPROCEDURE EVALUATION
Post-Op Assessment Note    CV Status:  Stable  Pain Score: 0    Pain management: adequate       Mental Status:  Sleepy and arousable   Hydration Status:  Euvolemic   PONV Controlled:  Controlled   Airway Patency:  Patent     Post Op Vitals Reviewed: Yes    No anethesia notable event occurred.    Staff: Anesthesiologist, CRNA   Comments: Report given to recovering RN, VSS, Pt resting comfortably            /66 (10/02/24 1328)    Temp 97.8 °F (36.6 °C) (10/02/24 1328)    Pulse (!) 110 (10/02/24 1328)   Resp 16 (10/02/24 1328)    SpO2 100 % (10/02/24 1328)

## 2024-10-02 NOTE — OP NOTE
OPERATIVE REPORT    PATIENT NAME: Stella Sorenson   :  1990  MRN: [id number]  Pt Location: [facility] OR ROOM 02    SURGERY DATE: 10/2/2024    SURGEON(S) and ROLE:  Primary: Jono Smiley DO  Assisting: Zheng Novoa MD; Tristan Sahni PA-C    NOTE:  I was present for the entire procedure and performed all essential portions of the surgery.      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    POSTOPERATIVE DIAGNOSES:  Same    PROCEDURES:  Resection of granular cell tumor right thenar eminence       ANESTHESIA TYPE:  General LMA    ANESTHESIA STAFF:   Anesthesiologist: Pino Clarke DO  CRNA: Uriel Bocanegra CRNA; Malcolm Moffett CRNA    ESTIMATED BLOOD LOSS:  minimal mL    TOURNIQUET TIME:  17 minutes    PERIOPERATIVE ANTIBIOTICS:  cefazolin, 2 grams    IMPLANTS:  none    * No implants in log *    SPECIMENS:    ID Type Source Tests Collected by Time Destination   1 : right hand mass proximal margin Tissue Mass TISSUE EXAM Jono Smiley, DO 10/2/2024 1243    2 : right hand mass distal margin Tissue Mass TISSUE EXAM Jono Smiley, DO 10/2/2024 1244    3 : right hand mass radial margin Tissue Mass TISSUE EXAM Jono Smiley, DO 10/2/2024 1244    4 : right hand mass ulnar margin Tissue Mass TISSUE EXAM Jono Smiley, DO 10/2/2024 1244    5 : right hand mass deep margin Tissue Mass TISSUE EXAM Jono Smiley, DO 10/2/2024 1244    6 : right hand mass- suture- 1 radial, 2 proximal Tissue Mass TISSUE EXAM Jono Smiley, DO 10/2/2024 1252        DRAINS:  None      OPERATIVE INDICATIONS:  The patient is a 34 y.o. adult with atypical granular cell tumor right thenar eminence.  Surgical treatment was indicated due to need to obtain clean surgical margins after previous biopsy done by another surgeon.  Patient has atypical granular cell tumor which can potentially have malignant features.  After a thorough discussion of the potential risks, benefits, and alternative treatments, the patient  agreed to proceed with surgery.  The patient understands that the risks of surgery include, but are not limited to: Recurrence of mass, need for further surgery, malignant diagnosis.  Oral and written consent for surgery was obtained from the patient preoperatively.    PROCEDURE AND TECHNIQUE:  On the day of surgery, the patient was identified by myself in the preoperative holding area.  The operative site was marked by the surgeon as the proper operative extremity.  She was taken to the operating room where she was transferred operating room table, she placed in the supine position with all bony prominences well-padded.    The patient was anesthetized, intubated and sedated in the standard manner and perioperative antibiotics, ancef, were administered.  The patient was positioned supine on the OR table.  A tourniquet cuff was applied to the operative extremity, set at 250 mmHg.  The operative site was prepped and draped using standard sterile technique.  A time-out was conducted to confirm the patient's identity, identifying all team members in the room, the operative site, and the proposed procedure.     Approximately 4 cm incision was made over the previous surgical site over the right thenar eminence after injecting anesthetic proximal to the surgical site.  Incision was created in an ellipse fashion from the previous vertical incision in order to obtain clean skin margins.  Skin was sharply incised down to the deep subcutaneous tissue.  Small subcutaneous skin flaps were made both anteriorly and posteriorly.  Incision was taken down into the muscular fascia of the thenar eminence.  This was done safely protecting the area of the previous biopsy without contamination.  Plan was to resect as a sandwich, skin on top with tumor tissue deep with the underlying fascia that was over the muscle resected with the tumor with a wide resection.  Fascia was elevated sharply with a knife off of the thenar musculature.  The  mass was removed en bloc.    Measured on the back table to be approximate 4cm x 1.5cm.  Remaining defect was 4 cm x 3 cm .  There is no visible tumor left over, only normal muscular tissue.  Margins were then obtained from the radial ulnar proximal distal and deep areas of where the tumor resided.  These were sent for margin pathology.  Wound was irrigated with normal saline, anesthetic was injected once again proximally and circumferentially to create a field block.  Tourniquet was let down 17 minutes, hemostasis was obtained.  Incision was closed with 3-0 nylon.  There was not significant tension over the wound.  Skin was cleansed and dried, Mepilex dressing along with 4 x 4 soft roll and Ace wrap was placed.  Patient was awoken from anesthesia without complication sent to the PACU     I was present for the entire procedure.     COMPLICATIONS:  None    PATIENT DISPOSITION:  PACU  and extubated and stable    Plan:   WBAT to operative extremity  Return to activities of daily living  ROM: no restrictions  PT/OT  Anticoagulation: Return to home anticoagulation (none)  Keep dressing in place until follow up in 10-14 days  Multimodal pain control    SIGNATURE:  Jono Smiley DO  DATE:  October 2, 2024  TIME:  1:24 PM

## 2024-10-02 NOTE — ANESTHESIA PREPROCEDURE EVALUATION
Procedure:  Excision of mass of right hand; FROZEN MARGINS (Right: Hand)    Relevant Problems   ANESTHESIA (within normal limits)      CARDIO   (+) Essential hypertension   (+) Hyperlipidemia   (+) Hypertriglyceridemia   (+) Mixed hyperlipidemia      GI/HEPATIC   (+) Gastro-esophageal reflux disease without esophagitis (Controlled on medication)      NEURO/PSYCH   (+) Depression   (+) Generalized anxiety disorder      PULMONARY   (+) Mild intermittent asthma without complication   (+) ALEJANDRA (obstructive sleep apnea) (Uses CPAP)        Physical Exam    Airway    Mallampati score: II  TM Distance: >3 FB  Neck ROM: full     Dental   No notable dental hx     Cardiovascular  Cardiovascular exam normal    Pulmonary  Pulmonary exam normal     Other Findings        Anesthesia Plan  ASA Score- 3     Anesthesia Type- general with ASA Monitors.         Additional Monitors:     Airway Plan: LMA.    Comment: Tolerated GA/LMA with recent hand procedure.       Plan Factors-    Chart reviewed.    Patient summary reviewed.                  Induction- intravenous.    Postoperative Plan-         Informed Consent- Anesthetic plan and risks discussed with patient.

## 2024-10-02 NOTE — INTERVAL H&P NOTE
H&P reviewed. After examining the patient I find no changes in the patients condition since the H&P had been written. 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 She is keen for surgery and has been NPO.     Vitals:    10/02/24 0945   BP: 134/83   Pulse: 94   Resp: 16   Temp: 97.8 °F (36.6 °C)   SpO2: 97%

## 2024-10-03 ENCOUNTER — TELEPHONE (OUTPATIENT)
Age: 34
End: 2024-10-03

## 2024-10-03 NOTE — PROGRESS NOTES
[facility] WOM[facility]'S HE[facility]  PROBLEM VISIT    SUBJECTIVE:  HPI: Stella Sorenson is a 34 y.o.  adult who presents to discuss  desire to get a hysterectomy. Pt states that they have a history of recurrent pregnancy loss and has now come to terms with not having children with her partner and would like to proceed with removal of the uterus. They also reports irregular menstrual periods but the primary reason for requesting a hysterectomy is her desire to not experience another pregnancy loss again. We then discussed her previous workup for recurrent pregnancy loss, which included lab tests for Antiphospholipid syndrome,  which were negative; Thyroid dysfunction with normal TSH  and Diabetes with normal HgbA1c. She has had pelvic ultrasound in 2019 that was unremarkable and a CT scan in 2025 which demonstrated enlarged ovary and subserosal fibroid. A follow up pelvic ultrasound was recommended and ordered but was not completed. After reviewing these results we discussed that while she has had extensive workup for her recurrent pregnancy loss, there has not been workup to assess for intrauterine structural anomaly and tubal patency. She was offered continued workup for the recurrent pregnancy loss via Saline infusion sonogram or continued discussion regarding permanent sterilization. We also briefly reviewed options for permanent sterilization via bilateral salpingectomy, which would be lower risk procedure compared with Hysterectomy, which can have higher blood loss and risk to injury to local organs like bowel and bladder. She states that she would like to continue the workup for the recurrent pregnancy loss and if that remains inconclusive that she would like to proceed with sterilization.     Plan to schedule office Saline Sonogram .     Past Medical History:   Diagnosis Date    Anxiety     Asthma     Cervical cyst     Current moderate episode of major depressive disorder without prior episode (HCC)      Depression     Fatty liver     Hypertension     Hypertriglyceridemia     Mixed hyperlipidemia     Ovarian cyst     Ovarian cyst     left    Sleep apnea     uses CPAP       OB History    Para Term  AB Living   9       9      IAB Ectopic Multiple Live Births   9              # Outcome Date GA Lbr Bridger/2nd Weight Sex Type Anes PTL Lv   9 2023           8 2023           7 2022           6 2021           5 2020           4 2019           3 2015           2 2011           1 2010              Obstetric Comments   Miscarriage        Past Surgical History:   Procedure Laterality Date    MS ESOPHAGOGASTRODUODENOSCOPY TRANSORAL DIAGNOSTIC N/A 2019    Procedure: ESOPHAGOGASTRODUODENOSCOPY (EGD);  Surgeon: Blake Irby MD;  Location: [facility];  Service: Gastroenterology    MS EXC B9 LESION MRGN XCP SK TG T/A/L 0.6-1.0 CM Right 2024    Procedure: EXCISION MASS - RIGHT HAND;  Surgeon: Tej Garcia MD;  Location[facility];  Service: Orthopedics    MS RAD RESCJ AMANDEEP SOFT TISSUE HAND/FINGER 3 CM/> Right 10/2/2024    Procedure: Excision of mass of right hand;;  Surgeon: Jono Smiley DO;  Location: [facility];  Service: Orthopedics    TOOTH EXTRACTION      WISDOM TOOTH EXTRACTION         Social History     Tobacco Use    Smoking status: Never    Smokeless tobacco: Never   Vaping Use    Vaping status: Never Used   Substance Use Topics    Alcohol use: Not Currently    Drug use: No         Current Outpatient Medications:     albuterol (PROVENTIL HFA,VENTOLIN HFA) 90 mcg/act inhaler, Inhale 2 puffs every 6 (six) hours as needed for wheezing, Disp: , Rfl:     Denta 5000 Plus 1.1 % CREA, BRUSH 1 TIME DAILY FOR 2 MINUTES NO FOOD OR DRINKS FOR 30 MINUTES, Disp: , Rfl:     losartan (COZAAR) 100 MG tablet, Take 1 tablet (100 mg total) by mouth daily, Disp: 90 tablet, Rfl: 1    omeprazole (PriLOSEC) 20 mg delayed release capsule, Take 1 capsule by mouth once  "daily, Disp: 30 capsule, Rfl: 0    QUEtiapine (SEROquel) 50 mg tablet, TAKE 0.5 TABLET BY MOUTH DAILY AT BEDTIME FOR 7 DAYS, THEN 1 TABLET DAILY AT BEDTIME., Disp: 90 tablet, Rfl: 1    rosuvastatin (CRESTOR) 5 mg tablet, Take 1 tablet by mouth once daily, Disp: 30 tablet, Rfl: 5    venlafaxine (EFFEXOR-XR) 150 mg 24 hr capsule, Take 1 capsule by mouth once daily, Disp: 100 capsule, Rfl: 1    venlafaxine (EFFEXOR-XR) 37.5 mg 24 hr capsule, Take 1 capsule by mouth once daily, Disp: 100 capsule, Rfl: 1    celecoxib (CeleBREX) 100 mg capsule, Take 1 capsule (100 mg total) by mouth 2 (two) times a day for 14 days, Disp: 28 capsule, Rfl: 0    cetirizine (ZyrTEC) 10 mg tablet, Take 10 mg by mouth daily, Disp: , Rfl:     multivitamin (THERAGRAN) TABS, Take 1 tablet by mouth daily, Disp: , Rfl:     ondansetron (ZOFRAN-ODT) 4 mg disintegrating tablet, Take 1 tablet (4 mg total) by mouth every 6 (six) hours as needed for nausea or vomiting, Disp: 15 tablet, Rfl: 0    senna-docusate sodium (SENOKOT-S) 8.6-50 mg per tablet, Take 1 tablet by mouth daily for 14 days, Disp: 14 tablet, Rfl: 0    traMADol (Ultram) 50 mg tablet, Take 1 tablet (50 mg total) by mouth every 6 (six) hours as needed for moderate pain or severe pain for up to 10 days, Disp: 40 tablet, Rfl: 0  No current facility-administered medications for this visit.    OBJECTIVE:  Vitals:    10/01/24 1027   BP: 120/82   BP Location: Right arm   Patient Position: Sitting   Cuff Size: Adult   Pulse: 102   Resp: 18   Weight: 104 kg (229 lb)   Height: 5' 3\" (1.6 m)       Physical Exam  Vitals and nursing note reviewed.   Constitutional:       General: Stella is not in acute distress.     Appearance: Stella is well-developed. Stella is obese.   HENT:      Head: Normocephalic and atraumatic.   Eyes:      Conjunctiva/sclera: Conjunctivae normal.   Cardiovascular:      Rate and Rhythm: Normal rate.   Pulmonary:      Effort: Pulmonary effort is normal.   Musculoskeletal:      " Cervical back: Neck supple.   Neurological:      General: No focal deficit present.      Mental Status: Stella is alert.   Psychiatric:         Mood and Affect: Mood normal.         ASSESSMENT:  Stella Sorenson is a 34 y.o.  female who presented for discussion of recurrent pregnancy loss and discussion regarding permanent sterilization. Patient would like to continue further workup of recurrent pregnancy loss.     No results found for this or any previous visit (from the past 12 hour(s)).    PLAN:  Recurrent pregnancy loss:  Previous workup: Antiphospholipid syndrome , negative                                  TSH wnl                                  HgA1c negative   Imaging: CT pelvis: fibroid and large ovary , followup ultrasound never completed, SIS recommended and to be scheduled  If anatomic anomalies are noted to be normal, consider further workup to rule out Chromosomal abnormalities   If further workup remains inconclusive, pt desires permenent sterilization, plan to schedule appt for further discuss once workup is complete.      Linda Luna MD   PGY-III OBGYN  10/03/24

## 2024-10-08 PROCEDURE — 88341 IMHCHEM/IMCYTCHM EA ADD ANTB: CPT | Performed by: PATHOLOGY

## 2024-10-08 PROCEDURE — 88342 IMHCHEM/IMCYTCHM 1ST ANTB: CPT | Performed by: PATHOLOGY

## 2024-10-08 PROCEDURE — 88305 TISSUE EXAM BY PATHOLOGIST: CPT | Performed by: PATHOLOGY

## 2024-10-10 ENCOUNTER — OFFICE VISIT (OUTPATIENT)
Dept: OBGYN CLINIC | Facility: MEDICAL CENTER | Age: 34
End: 2024-10-10

## 2024-10-10 VITALS
HEART RATE: 105 BPM | WEIGHT: 228 LBS | DIASTOLIC BLOOD PRESSURE: 94 MMHG | HEIGHT: 63 IN | SYSTOLIC BLOOD PRESSURE: 139 MMHG | BODY MASS INDEX: 40.4 KG/M2

## 2024-10-10 DIAGNOSIS — Z98.890 S/P MUSCULOSKELETAL SYSTEM SURGERY: Primary | ICD-10-CM

## 2024-10-10 PROCEDURE — 99024 POSTOP FOLLOW-UP VISIT: CPT | Performed by: STUDENT IN AN ORGANIZED HEALTH CARE EDUCATION/TRAINING PROGRAM

## 2024-10-10 NOTE — PROGRESS NOTES
Orthopedic Oncology Post Operative Office Note  Stella Sorenson (34 y.o. adult)   : 1990   MRN: [id number]   Encounter Date: 10/10/2024  Dr. Jono Smiley DO, Orthopedic Surgeon  Orthopedic Oncology & Sarcoma Surgery   DOS: 10/2/2024  Procedure performed: Excision of mass of right hand; - Right      Impression/Plan: 34 y.o. adult with a history of giant cell tumor 8 days s/p Excision of mass of right hand; - Right  Final pathology consistent with no residual neoplasm.     S/p Excision of mass of right thumb    Wound Care   OK to shower., Dab dry with towel., Steristrips will fall off on their own., No soaking or bathing for another 2 weeks.  Pain control: use over the counter analgesics as needed   Continue ice packs/elevation  Physical therapy/occupational therapy is not indicated at this time.  Continue with full WBAT to right upper extremity  Complete DVT ppx: n/a   We will continue to monitor this area closely.   Return in about 3 months (around 1/10/2025) for f/u, R hand .  for evaluation without x-ray         Subjective:  34 y.o. adult presenting to the office for 8  day  follow up, status post Excision of mass of right hand; - Right  DOS: 10/2/2024    Patient states that their pain is none     Patient is not taking OTC pain medications for pain.   Numbness/weakness extremity: None Reported   Physical Therapy Progress: Not indicated at this time  DVT ppx: Return to home anticoagulation  Antibiotics: n/a   Eating/Drinking improving. Bowel/Bladder: WNL  Patient denies symptoms of an infection.     Review of Systems  Review of systems negative unless otherwise specified in HPI    Physical exam  Vitals:    10/10/24 1546   BP: 139/94   Pulse: 105       Exam: right base of thumb   Incision: healing well no significant drainage no dehiscence no significant erythema   ROM: limited by stiffness   Motor: AIN, UIN, PIN intact  Sensation: intact   Brisk Capillary Refill    Pathology: FINAL  Final Diagnosis   A.  "Soft tissue, right hand proximal margin:    FIBRO-FATTY TISSUE     Note: No residual tumor is identified. Deeper sections were also evaluated.     B. Soft tissue, right hand distal margin:   FIBROSING GRANULATION TISSUE     Note: No residual tumor is identified. Deeper sections were also evaluated.     C. Soft tissue, right hand radial margin:     FIBRO-FATTY TISSUE     Note: No residual tumor is identified. Deeper sections were also evaluated.     D. Soft tissue, right hand ulnar margin:    FIBRO-FATTY TISSUE     Note: No residual tumor is identified. Deeper sections were also evaluated.     E. Soft tissue, right hand deep margin:   SKELETAL MUSCLE     Note: No residual tumor is identified. Deeper sections were also evaluated.     F. Skin, right hand- suture- 1 radial, 2 proximal:   SCAR SECONDARY TO PREVIOUS PROCEDURE     Note: There is no residual neoplasm in this multiply-sectioned specimen, including on S100 immunostain.  There is a zone of uninvolved tissue on all margins, and the lesion appears completely excised.  There is also suture granuloma.     Gross Description    A. The specimen is received in formalin, labeled with the patient's name and hospital number, and is designated \" right tan mass proximal margin\".  The specimen consists of a tan-white portion of soft tissue measuring 0.4 cm in greatest dimension.  Entirely submitted in an embedding bag in 1 cassette.  B. The specimen is received in formalin, labeled with the patient's name and hospital number, and is designated \" right hand mass distal margin\".  The specimen consists of 8 white soft tissue fragment measuring 0.4 cm in greatest dimension.  Entirely submitted in an embedding bag in 1 cassette.  C. The specimen is received in formalin, labeled with the patient's name and hospital number, and is designated \" right tan mass radial margin\".  The specimen consists of a tan-white soft tissue fragment measuring 0.4 cm in greatest dimension.  Entirely " "submitted in an embedding bag in 1 cassette.  D. The specimen is received in formalin, labeled with the patient's name and hospital number, and is designated \" right tan mass Ulnar margin\".  The specimen consists of a tan-white soft tissue fragment measuring 0.3 cm in greatest dimension.  Entirely submitted in an embedding bag in 1 cassette.  E. The specimen is received in formalin, labeled with the patient's name and hospital number, and is designated \" right hand mass deep margin\".  The specimen consists of a tan-brown soft tissue fragment measuring 0.4 cm in greatest dimension.  Entirely submitted in an embedding bag in 1 cassette.  F. The specimen is received in formalin, labeled with the patient's name and hospital number, and is designated \" right hand mass sutures, 1 radial, 2 proximal\".  The specimen consists of a white ellipse of skin measuring 2.6 x 1.5 cm with attached underlying soft tissue to a depth of up to 0.5 cm.  The specimen exhibits 2 sutures was for protocol indicate 2 sutures proximal (arbitrarily designated as 12:00) and 1 suture radial (arbitrarily designated as 9:00).  The skin is inked as follows: 12-3:00-yellow, 3-6:00- green, 6-9:00- orange, 9-12:00- blue.  The epithelial surface exhibits remnants of a scar measuring 2.0 x 0.2 cm which is located 0.5 cm from the 1-2 o'clock margins and 0.7 cm from the 9-10 o'clock margins.  The specimen is serially sectioned in a 12 the 6:00 progression revealing tan-white cut surfaces.  Entirely submitted. Five cassettes between sponges.     1: 12 and 6:00 tips  2-5: Center sequentially submitted in a 12 the 6:00 progression with the scar         Imaging:  Study: MRI right thumb w wo  Date: 8/7/24  Report: I have read and agree with the radiologist report. and I have personally reviewed the imaging in PACS and my impression is as follows:  My impression is as follows:   Small, indeterminate, superficial mass along the thenar eminence, as described. " Although this could represent a complex sebaceous cyst, further evaluation is warranted. Due to the small and superficial nature of the mass, and ultrasound may be helpful to assess for solid or cystic components.     Study: XR right hand  Date: 6/28/24  Report: I have read and agree with the radiologist report. and I have personally reviewed the imaging in PACS and my impression is as follows:  My impression is as follows:   No acute osseous abnormality.         Scribe Attestation      I,:  Leeann Woodall am acting as a scribe while in the presence of the attending physician.:       I,:  Jono Smiley DO personally performed the services described in this documentation    as scribed in my presence.:

## 2024-10-18 DIAGNOSIS — K21.9 GASTRO-ESOPHAGEAL REFLUX DISEASE WITHOUT ESOPHAGITIS: ICD-10-CM

## 2024-10-22 ENCOUNTER — PROCEDURE VISIT (OUTPATIENT)
Dept: OBGYN CLINIC | Facility: CLINIC | Age: 34
End: 2024-10-22

## 2024-10-22 VITALS
DIASTOLIC BLOOD PRESSURE: 92 MMHG | HEIGHT: 63 IN | WEIGHT: 230 LBS | SYSTOLIC BLOOD PRESSURE: 137 MMHG | RESPIRATION RATE: 18 BRPM | HEART RATE: 99 BPM | BODY MASS INDEX: 40.75 KG/M2

## 2024-10-22 DIAGNOSIS — N96 RECURRENT PREGNANCY LOSS: Primary | ICD-10-CM

## 2024-10-22 DIAGNOSIS — Z32.02 PREGNANCY TEST NEGATIVE: ICD-10-CM

## 2024-10-22 LAB — SL AMB POCT URINE HCG: NEGATIVE

## 2024-10-22 PROCEDURE — 81025 URINE PREGNANCY TEST: CPT | Performed by: OBSTETRICS & GYNECOLOGY

## 2024-10-22 PROCEDURE — 99213 OFFICE O/P EST LOW 20 MIN: CPT | Performed by: OBSTETRICS & GYNECOLOGY

## 2024-10-22 PROCEDURE — 58340 CATHETER FOR HYSTEROGRAPHY: CPT | Performed by: OBSTETRICS & GYNECOLOGY

## 2024-10-22 PROCEDURE — 76831 ECHO EXAM UTERUS: CPT | Performed by: OBSTETRICS & GYNECOLOGY

## 2024-11-05 NOTE — PROGRESS NOTES
Sonohysterogram    Date/Time: 10/22/2024 10:00 AM    Performed by: Linda Luna MD  Authorized by: Linda Luna MD  Universal Protocol:  Procedure performed by: (Dr. Patiño)  Consent: Verbal consent obtained.  Risks and benefits: risks, benefits and alternatives were discussed  Consent given by: patient  Patient understanding: patient states understanding of the procedure being performed  Patient consent: the patient's understanding of the procedure matches consent given  Procedure consent: procedure consent matches procedure scheduled  Relevant documents: relevant documents present and verified  Test results: test results available and properly labeled  Site marked: the operative site was marked  Radiology Images displayed and confirmed. If images not available, report reviewed: imaging studies available  Required items: required blood products, implants, devices, and special equipment available  Patient identity confirmed: verbally with patient and provided demographic data    Pre-procedure:     Prepped with: Betadine    Procedure:     Cervix cleaned and prepped: yes      Cervix dilated: no      Tenaculum applied to cervix: yes      Uterus sounded: yes      Uterus sound depth (cm):  8    Catheter inserted: yes      Uterine cavity distended with saline: yes    Post-procedure:     Patient observed: yes      Patient observation time:  5 minutes    No complications: yes      Estimated blood loss (mL):  0    Post procedure instructions given to patient: yes      Nothing in vagina for 2 weeks: yes      Patient tolerated procedure well with no complications: yes    Comments:          Procedure details :   Patient was positioned in dorsal lithotomy position, a speculum was  introduced into the vagina and a narrow catheter was placed in the vagina, through the cervix, and into the uterine cavity. The ultrasound examination was continued while sterile saline (salt water) was instilled into the uterus. The saline solution  filled the uterus, helping to outline the uterine walls and cavity. There were no apparent abnormalities such as fibroids, polyps, or scar tissue inside the uterus noted during the scan.    All instruments were then removed from the vagina. Patient tolerated the procedure.      Patient was provided the precautions below:    SHG may also cause cramping, spotting, and vaginal discharge. Some women have cramping for several hours after the procedure.You should call your doctor if you experience pain or fever in the 1-2 days after the SHG.        Dr. Patiño present for the entirety of the procedure.     Linda Luna MD  OBGYN PGY3

## 2024-11-15 ENCOUNTER — HOSPITAL ENCOUNTER (EMERGENCY)
Facility: HOSPITAL | Age: 34
Discharge: HOME | End: 2024-11-15
Attending: EMERGENCY MEDICINE

## 2024-11-15 VITALS
HEART RATE: 91 BPM | OXYGEN SATURATION: 98 % | SYSTOLIC BLOOD PRESSURE: 177 MMHG | TEMPERATURE: 98.4 F | DIASTOLIC BLOOD PRESSURE: 112 MMHG | RESPIRATION RATE: 18 BRPM

## 2024-11-15 DIAGNOSIS — I10 UNCONTROLLED HYPERTENSION: ICD-10-CM

## 2024-11-15 DIAGNOSIS — R51.9 HEADACHE: Primary | ICD-10-CM

## 2024-11-15 LAB
ALBUMIN SERPL BCG-MCNC: 4.5 G/DL (ref 3.5–5)
ALP SERPL-CCNC: 43 U/L (ref 34–104)
ALT SERPL W P-5'-P-CCNC: 31 U/L (ref 7–52)
ANION GAP SERPL CALCULATED.3IONS-SCNC: 9 MMOL/L (ref 4–13)
AST SERPL W P-5'-P-CCNC: 17 U/L (ref 5–45)
BASOPHILS # BLD AUTO: 0.03 THOUSANDS/ÂΜL (ref 0–0.1)
BASOPHILS NFR BLD AUTO: 0 % (ref 0–1)
BILIRUB SERPL-MCNC: 0.28 MG/DL (ref 0.2–1)
BUN SERPL-MCNC: 14 MG/DL (ref 5–25)
CALCIUM SERPL-MCNC: 9.2 MG/DL (ref 8.4–10.2)
CHLORIDE SERPL-SCNC: 100 MMOL/L (ref 96–108)
CO2 SERPL-SCNC: 27 MMOL/L (ref 21–32)
CREAT SERPL-MCNC: 0.68 MG/DL (ref 0.6–1.3)
EOSINOPHIL # BLD AUTO: 0.24 THOUSAND/ÂΜL (ref 0–0.61)
EOSINOPHIL NFR BLD AUTO: 3 % (ref 0–6)
ERYTHROCYTE [DISTWIDTH] IN BLOOD BY AUTOMATED COUNT: 13.9 % (ref 11.6–15.1)
GLUCOSE SERPL-MCNC: 129 MG/DL (ref 65–140)
HCG SERPL QL: NEGATIVE
HCT VFR BLD AUTO: 34.1 % (ref 36.5–46.1)
HGB BLD-MCNC: 11 G/DL (ref 12–15.4)
IMM GRANULOCYTES # BLD AUTO: 0.02 THOUSAND/UL (ref 0–0.2)
IMM GRANULOCYTES NFR BLD AUTO: 0 % (ref 0–2)
LYMPHOCYTES # BLD AUTO: 2.75 THOUSANDS/ÂΜL (ref 0.6–4.47)
LYMPHOCYTES NFR BLD AUTO: 35 % (ref 14–44)
MCH RBC QN AUTO: 28 PG (ref 26.8–34.3)
MCHC RBC AUTO-ENTMCNC: 32.3 G/DL (ref 31.4–37.4)
MCV RBC AUTO: 87 FL (ref 82–98)
MONOCYTES # BLD AUTO: 0.47 THOUSAND/ÂΜL (ref 0.17–1.22)
MONOCYTES NFR BLD AUTO: 6 % (ref 4–12)
NEUTROPHILS # BLD AUTO: 4.46 THOUSANDS/ÂΜL (ref 1.85–7.62)
NEUTS SEG NFR BLD AUTO: 56 % (ref 43–75)
NRBC BLD AUTO-RTO: 0 /100 WBCS
PLATELET # BLD AUTO: 343 THOUSANDS/UL (ref 149–390)
PMV BLD AUTO: 9.5 FL (ref 8.9–12.7)
POTASSIUM SERPL-SCNC: 3.8 MMOL/L (ref 3.5–5.3)
PROT SERPL-MCNC: 7.5 G/DL (ref 6.4–8.4)
RBC # BLD AUTO: 3.93 MILLION/UL (ref 3.88–5.12)
SODIUM SERPL-SCNC: 136 MMOL/L (ref 135–147)
WBC # BLD AUTO: 7.97 THOUSAND/UL (ref 4.31–10.16)

## 2024-11-15 PROCEDURE — 80053 COMPREHEN METABOLIC PANEL: CPT | Performed by: EMERGENCY MEDICINE

## 2024-11-15 PROCEDURE — 99284 EMERGENCY DEPT VISIT MOD MDM: CPT

## 2024-11-15 PROCEDURE — 85025 COMPLETE CBC W/AUTO DIFF WBC: CPT | Performed by: EMERGENCY MEDICINE

## 2024-11-15 PROCEDURE — 36415 COLL VENOUS BLD VENIPUNCTURE: CPT | Performed by: EMERGENCY MEDICINE

## 2024-11-15 PROCEDURE — 84703 CHORIONIC GONADOTROPIN ASSAY: CPT | Performed by: EMERGENCY MEDICINE

## 2024-11-15 PROCEDURE — 96374 THER/PROPH/DIAG INJ IV PUSH: CPT

## 2024-11-15 RX ORDER — LOSARTAN POTASSIUM 50 MG/1
100 TABLET ORAL ONCE
Status: COMPLETED | OUTPATIENT
Start: 2024-11-15 | End: 2024-11-15

## 2024-11-15 RX ORDER — METOCLOPRAMIDE HYDROCHLORIDE 5 MG/ML
10 INJECTION INTRAMUSCULAR; INTRAVENOUS ONCE
Status: COMPLETED | OUTPATIENT
Start: 2024-11-15 | End: 2024-11-15

## 2024-11-15 RX ADMIN — LOSARTAN POTASSIUM 100 MG: 50 TABLET, FILM COATED ORAL at 20:18

## 2024-11-15 RX ADMIN — METOCLOPRAMIDE 10 MG: 5 INJECTION, SOLUTION INTRAMUSCULAR; INTRAVENOUS at 19:26

## 2024-11-16 NOTE — ED PROVIDER NOTES
Time reflects when diagnosis was documented in both MDM as applicable and the Disposition within this note       Time User Action Codes Description Comment    11/15/2024  7:54 PM Rehan Moore [R51.9] Headache     11/15/2024  7:54 PM Rehan Moore [I10] Uncontrolled hypertension           ED Disposition       ED Disposition   Discharge    Condition   Stable    Date/Time   Fri Nov 15, 2024  7:55 PM    Comment   Stella Sorenson discharge to home/self care.                   Assessment & Plan       Medical Decision Making  35 y/o genderqueer (physiologically female) patient with hx of HTN, HLD, and anxiety presents to the ED for evaluation of headache, nausea, and intermittent blurred vision in the setting of elevated blood pressure.  The patient states that they started experiencing a headache at approximately 1500 this afternoon.  They checked their blood pressure at a local [facility] tonight and found it to be 193/121, which alarmed them and then decided to come to the ER for evaluation.  While on the way to the ER they started experiencing mild nausea and intermittent blurred vision associated with the headache.  Of note, they have been off of their antihypertensive medication for the last 4 days, losartan 100 mg daily.  They note that they stopped taking the losartan 4 days ago due to concerns of possible pregnancy, however the pregnancy testing was negative and they plan to resume the losartan tonight. No fever, chills, cough, dyspnea, chest pain, palpitations, abdominal pain, vomiting, diarrhea, urinary symptoms, back pain, neck pain, vision loss, vertigo, numbness, or focal weakness.    Vital signs reviewed.  See physical exam documentation for exam findings.  Differential diagnosis includes but is not limited to hypertensive urgency, medication noncompliance, primary headache, intracranial bleed (unlikely given benign exam), anxiety, anemia, and/or electrolyte disturbance.  Will evaluate with basic  labs, CT head, and treat symptomatically.  See ED course for independent interpretation of results.  Labs show mild chronic anemia that is stable, otherwise no acute abnormalities. The patient is feeling better after Reglan.  The patient was initially agreeable to workup including basic labs and CT head without contrast, however the patient now feels like they no longer need the CT imaging.  I discussed with the patient and with shared decision making feel that this is a reasonable course of action; CT order canceled.  Patient provided with a dose of their home losartan and instructed to resume the prescribed daily losartan and also follow-up with her primary care provider.  I discussed all findings, treatment, red flags/return precautions, and outpatient follow-up and the patient/family understand and agree. Stable for discharge.    Amount and/or Complexity of Data Reviewed  Labs: ordered. Decision-making details documented in ED Course.  Radiology: ordered.    Risk  Prescription drug management.        ED Course as of 11/15/24 2011   Fri Nov 15, 2024   1942 CBC and differential(!)  Mild anemia with Hgb 11, Hct 34.1, chronic and stable, similar to prior values.  Normal WBC and platelet count.   1953 The patient is feeling better after Reglan.  The patient was initially agreeable to workup including basic labs and CT head without contrast, however the patient now feels like they no longer need the CT imaging.  I discussed with the patient and with shared decision making feel that this is a reasonable course of action; CT order canceled   1953 Comprehensive metabolic panel  All WNL   1953 hCG, qualitative pregnancy  Negative       Medications   losartan (COZAAR) tablet 100 mg (has no administration in time range)   metoclopramide (REGLAN) injection 10 mg (10 mg Intravenous Given 11/15/24 1926)       ED Risk Strat Scores                           SBIRT 20yo+      Flowsheet Row Most Recent Value   Initial Alcohol  Screen: US AUDIT-C     1. How often do you have a drink containing alcohol? 0 Filed at: 11/15/2024 1931   2. How many drinks containing alcohol do you have on a typical day you are drinking?  0 Filed at: 11/15/2024 1931   3a. Male UNDER 65: How often do you have five or more drinks on one occasion? 0 Filed at: 11/15/2024 1931   3b. FEMALE Any Age, or MALE 65+: How often do you have 4 or more drinks on one occassion? 0 Filed at: 11/15/2024 1931   Audit-C Score 0 Filed at: 11/15/2024 1931   ARAMIS: How many times in the past year have you...    Used an illegal drug or used a prescription medication for non-medical reasons? Never Filed at: 11/15/2024 1931                            History of Present Illness       Chief Complaint   Patient presents with    Hypertension     Patient reports taking blood pressure at [facility] today and found blood pressure to be 193/121. Patient c/o headache on the left side of head, nausea, and vision changes. Patient states stopped taking BP meds four days ago.        Past Medical History:   Diagnosis Date    Abnormal Pap smear of cervix     all until my last one in 2014    Anxiety     Asthma     Cancer (HCC) 08/2024    Melanoma    Cervical cyst     Current moderate episode of major depressive disorder without prior episode (HCC)     Depression     Fatty liver     Fibroid     Genital warts 2011    Hypertension     Hypertriglyceridemia     Miscarriage     10    Mixed hyperlipidemia     Ovarian cyst     Ovarian cyst     left    Sleep apnea     uses CPAP      Past Surgical History:   Procedure Laterality Date    OR ESOPHAGOGASTRODUODENOSCOPY TRANSORAL DIAGNOSTIC N/A 4/16/2019    Procedure: ESOPHAGOGASTRODUODENOSCOPY (EGD);  Surgeon: Blake Irby MD;  Location: [facility];  Service: Gastroenterology    OR EXC B9 LESION MRGN XCP SK TG T/A/L 0.6-1.0 CM Right 8/22/2024    Procedure: EXCISION MASS - RIGHT HAND;  Surgeon: Tej Garcia MD;  Location: [facility];  Service: Orthopedics     DE RAD RESCJ AMANDEEP SOFT TISSUE HAND/FINGER 3 CM/> Right 10/2/2024    Procedure: Excision of mass of right hand;;  Surgeon: Jono Smiley DO;  Location: [facility];  Service: Orthopedics    TOOTH EXTRACTION      WISDOM TOOTH EXTRACTION        Family History   Problem Relation Age of Onset    Hypertension Mother     Arthritis Father     Bipolar disorder Father     Heart disease Father         cardiac disorder    Hypertension Father     Alcohol abuse Father         He has recovered and no longer drinks.    ADD / ADHD Father     Hypertension Sister     No Known Problems Sister     Heart attack Paternal Grandfather          from it.    Arthritis Family     Hypertension Family     Hypertension Family     Hypertension Sister     ADD / ADHD Sister     Bipolar disorder Sister     Miscarriages / Stillbirths Sister     Miscarriages / Stillbirths Sister     Colon cancer Neg Hx     Ovarian cancer Neg Hx     Breast cancer Neg Hx     Uterine cancer Neg Hx       Social History     Tobacco Use    Smoking status: Never    Smokeless tobacco: Never   Vaping Use    Vaping status: Never Used   Substance Use Topics    Alcohol use: Not Currently    Drug use: No      E-Cigarette/Vaping    E-Cigarette Use Never User       E-Cigarette/Vaping Substances    Nicotine No     THC No     CBD No     Flavoring No     Other No     Unknown No       I have reviewed and agree with the history as documented.     33 y/o genderqueer (physiologically female) patient with hx of HTN, HLD, and anxiety presents to the ED for evaluation of headache, nausea, and intermittent blurred vision in the setting of elevated blood pressure.  The patient states that they started experiencing a headache at approximately 1500 this afternoon.  They checked their blood pressure at a local [facility] and found it to be 193/121, which alarmed them and then decided to come to the ER for evaluation.  While on the way to the ER they started experiencing mild nausea and  intermittent blurred vision associated with the headache.  Of note, they have been off of their antihypertensive medication for the last 4 days, losartan 100 mg daily.  They note that they stopped taking the losartan 4 days ago due to concerns of possible pregnancy, however the pregnancy testing was negative and they plan to resume the losartan tonight. No fever, chills, cough, dyspnea, chest pain, palpitations, abdominal pain, vomiting, diarrhea, urinary symptoms, back pain, neck pain, vision loss, vertigo, numbness, or focal weakness.        Review of Systems   Constitutional:  Negative for chills and fever.   HENT:  Negative for congestion, rhinorrhea and sore throat.    Eyes:         Intermittent blurry vision, see HPI   Respiratory:  Negative for cough and shortness of breath.    Cardiovascular:  Negative for chest pain and palpitations.   Gastrointestinal:  Positive for nausea. Negative for abdominal pain, diarrhea and vomiting.   Genitourinary:  Negative for dysuria and hematuria.   Musculoskeletal:  Negative for back pain and neck pain.   Neurological:  Positive for headaches. Negative for weakness, light-headedness and numbness.   All other systems reviewed and are negative.          Objective       ED Triage Vitals   Temperature Pulse Blood Pressure Respirations SpO2 Patient Position - Orthostatic VS   11/15/24 1900 11/15/24 1857 11/15/24 1857 11/15/24 1857 11/15/24 1857 11/15/24 1857   98.4 °F (36.9 °C) 91 (!) 177/112 18 98 % Sitting      Temp Source Heart Rate Source BP Location FiO2 (%) Pain Score    11/15/24 1900 11/15/24 1857 11/15/24 1857 -- 11/15/24 1857    Oral Monitor Right arm  7      Vitals      Date and Time Temp Pulse SpO2 Resp BP Pain Score FACES Pain Rating User   11/15/24 1900 98.4 °F (36.9 °C) -- -- -- -- -- -- [place]   11/15/24 1857 -- 91 98 % 18 177/112 7 -- [place]            Physical Exam  Vitals and nursing note reviewed.   Constitutional:       General: Stella is not in acute distress.      Appearance: Normal appearance. Stella is normal weight. Stella is not ill-appearing.   HENT:      Head: Normocephalic and atraumatic.      Right Ear: External ear normal.      Left Ear: External ear normal.      Nose: Nose normal. No congestion or rhinorrhea.      Mouth/Throat:      Mouth: Mucous membranes are moist.      Pharynx: Oropharynx is clear. No oropharyngeal exudate or posterior oropharyngeal erythema.   Eyes:      Extraocular Movements: Extraocular movements intact.      Conjunctiva/sclera: Conjunctivae normal.      Pupils: Pupils are equal, round, and reactive to light.      Comments: Pupils are equal, round, and reactive to light bilaterally.  Normal extraocular movements.  No visual field deficits.   Cardiovascular:      Rate and Rhythm: Normal rate and regular rhythm.      Pulses: Normal pulses.      Heart sounds: Normal heart sounds. No murmur heard.  Pulmonary:      Effort: Pulmonary effort is normal. No respiratory distress.      Breath sounds: Normal breath sounds. No wheezing or rales.   Abdominal:      General: Abdomen is flat. Bowel sounds are normal. There is no distension.      Palpations: Abdomen is soft.      Tenderness: There is no abdominal tenderness. There is no right CVA tenderness, left CVA tenderness or guarding.   Musculoskeletal:         General: No swelling or tenderness. Normal range of motion.      Cervical back: Normal range of motion and neck supple. No tenderness.   Skin:     General: Skin is warm and dry.      Capillary Refill: Capillary refill takes less than 2 seconds.   Neurological:      General: No focal deficit present.      Mental Status: Stella is alert and oriented to person, place, and time.      Cranial Nerves: No cranial nerve deficit.      Sensory: No sensory deficit.      Motor: No weakness.      Coordination: Coordination normal.      Gait: Gait normal.      Comments: A&O x 4, strength and sensation grossly intact and symmetric in the bilateral upper and lower  extremities, 5 out of 5 strength globally.  No facial asymmetry.  No dysarthria.  No aphasia.  Normal finger-nose and heel-to-shin bilaterally.         Results Reviewed       Procedure Component Value Units Date/Time    hCG, qualitative pregnancy [[id number]]  (Normal) Collected: 11/15/24 1921    Lab Status: Final result Specimen: Blood from Arm, Right Updated: 11/15/24 1948     Preg, Serum Negative    Comprehensive metabolic panel [[id number]] Collected: 11/15/24 1921    Lab Status: Final result Specimen: Blood from Arm, Right Updated: 11/15/24 1943     Sodium 136 mmol/L      Potassium 3.8 mmol/L      Chloride 100 mmol/L      CO2 27 mmol/L      ANION GAP 9 mmol/L      BUN 14 mg/dL      Creatinine 0.68 mg/dL      Glucose 129 mg/dL      Calcium 9.2 mg/dL      AST 17 U/L      ALT 31 U/L      Alkaline Phosphatase 43 U/L      Total Protein 7.5 g/dL      Albumin 4.5 g/dL      Total Bilirubin 0.28 mg/dL      eGFR --    Narrative:      Notes:     1. eGFR calculation is only valid for adults 18 years and older.  2. EGFR calculation cannot be performed for patients who are transgender, non-binary, or whose legal sex, sex at birth, and gender identity differ.    CBC and differential [[id number]]  (Abnormal) Collected: 11/15/24 1921    Lab Status: Final result Specimen: Blood from Arm, Right Updated: 11/15/24 1926     WBC 7.97 Thousand/uL      RBC 3.93 Million/uL      Hemoglobin 11.0 g/dL      Hematocrit 34.1 %      MCV 87 fL      MCH 28.0 pg      MCHC 32.3 g/dL      RDW 13.9 %      MPV 9.5 fL      Platelets 343 Thousands/uL      nRBC 0 /100 WBCs      Segmented % 56 %      Immature Grans % 0 %      Lymphocytes % 35 %      Monocytes % 6 %      Eosinophils Relative 3 %      Basophils Relative 0 %      Absolute Neutrophils 4.46 Thousands/µL      Absolute Immature Grans 0.02 Thousand/uL      Absolute Lymphocytes 2.75 Thousands/µL      Absolute Monocytes 0.47 Thousand/µL      Eosinophils Absolute 0.24 Thousand/µL      Basophils  Absolute 0.03 Thousands/µL             No orders to display       Procedures    ED Medication and Procedure Management   Prior to Admission Medications   Prescriptions Last Dose Informant Patient Reported? Taking?   Denta 5000 Plus 1.1 % CREA  Self Yes No   Sig: BRUSH 1 TIME DAILY FOR 2 MINUTES NO FOOD OR DRINKS FOR 30 MINUTES   QUEtiapine (SEROquel) 50 mg tablet  Self No No   Sig: TAKE 0.5 TABLET BY MOUTH DAILY AT BEDTIME FOR 7 DAYS, THEN 1 TABLET DAILY AT BEDTIME.   albuterol (PROVENTIL HFA,VENTOLIN HFA) 90 mcg/act inhaler  Self Yes No   Sig: Inhale 2 puffs every 6 (six) hours as needed for wheezing   celecoxib (CeleBREX) 100 mg capsule   No No   Sig: Take 1 capsule (100 mg total) by mouth 2 (two) times a day for 14 days   cetirizine (ZyrTEC) 10 mg tablet  Self Yes No   Sig: Take 10 mg by mouth daily   losartan (COZAAR) 100 MG tablet  Self No No   Sig: Take 1 tablet (100 mg total) by mouth daily   multivitamin (THERAGRAN) TABS  Self Yes No   Sig: Take 1 tablet by mouth daily   omeprazole (PriLOSEC) 20 mg delayed release capsule  Self No No   Sig: Take 1 capsule by mouth once daily   ondansetron (ZOFRAN-ODT) 4 mg disintegrating tablet  Self No No   Sig: Take 1 tablet (4 mg total) by mouth every 6 (six) hours as needed for nausea or vomiting   Patient not taking: Reported on 10/10/2024   rosuvastatin (CRESTOR) 5 mg tablet  Self No No   Sig: Take 1 tablet by mouth once daily   senna-docusate sodium (SENOKOT-S) 8.6-50 mg per tablet   No No   Sig: Take 1 tablet by mouth daily for 14 days   venlafaxine (EFFEXOR-XR) 150 mg 24 hr capsule  Self No No   Sig: Take 1 capsule by mouth once daily   venlafaxine (EFFEXOR-XR) 37.5 mg 24 hr capsule  Self No No   Sig: Take 1 capsule by mouth once daily      Facility-Administered Medications: None     Patient's Medications   Discharge Prescriptions    No medications on file     No discharge procedures on file.  ED SEPSIS DOCUMENTATION   Time reflects when diagnosis was documented in  both MDM as applicable and the Disposition within this note       Time User Action Codes Description Comment    11/15/2024  7:54 PM Rehan Moore [R51.9] Headache     11/15/2024  7:54 PM Rehan Moore [I10] Uncontrolled hypertension                  Rehan Moore MD  11/15/24 2011

## 2024-12-30 ENCOUNTER — TELEPHONE (OUTPATIENT)
Age: 34
End: 2024-12-30

## 2024-12-30 NOTE — TELEPHONE ENCOUNTER
Contacted Pt. in regards to MM Wait List, LVM for pt. to contact [phone], option 3 in regards to scheduling.    Verified in [facility]  [phone]

## 2025-01-08 NOTE — TELEPHONE ENCOUNTER
Contacted patient off of Medication Management  wait list to verify needs of services in attempts to offer appt. LVM for patient to contact intake dept  in regards to scheduling.    Verified in [facility]  [phone]    Attempt #2  Pt removed from wait list.

## 2025-02-12 DIAGNOSIS — F39 UNSPECIFIED MOOD (AFFECTIVE) DISORDER (HCC): ICD-10-CM

## 2025-02-12 DIAGNOSIS — F41.1 GENERALIZED ANXIETY DISORDER: ICD-10-CM

## 2025-02-12 RX ORDER — VENLAFAXINE HYDROCHLORIDE 37.5 MG/1
37.5 CAPSULE, EXTENDED RELEASE ORAL DAILY
Qty: 100 CAPSULE | Refills: 1 | Status: SHIPPED | OUTPATIENT
Start: 2025-02-12

## 2025-04-02 ENCOUNTER — APPOINTMENT (OUTPATIENT)
Age: 35
End: 2025-04-02
Payer: COMMERCIAL

## 2025-04-02 ENCOUNTER — OFFICE VISIT (OUTPATIENT)
Dept: FAMILY MEDICINE CLINIC | Facility: CLINIC | Age: 35
End: 2025-04-02
Payer: COMMERCIAL

## 2025-04-02 VITALS
OXYGEN SATURATION: 99 % | DIASTOLIC BLOOD PRESSURE: 80 MMHG | WEIGHT: 232 LBS | BODY MASS INDEX: 41.11 KG/M2 | HEART RATE: 87 BPM | SYSTOLIC BLOOD PRESSURE: 130 MMHG | HEIGHT: 63 IN

## 2025-04-02 DIAGNOSIS — J30.2 SEASONAL ALLERGIES: Primary | ICD-10-CM

## 2025-04-02 DIAGNOSIS — E78.2 MIXED HYPERLIPIDEMIA: ICD-10-CM

## 2025-04-02 DIAGNOSIS — Z13.0 SCREENING FOR DEFICIENCY ANEMIA: ICD-10-CM

## 2025-04-02 DIAGNOSIS — K21.9 GASTRO-ESOPHAGEAL REFLUX DISEASE WITHOUT ESOPHAGITIS: ICD-10-CM

## 2025-04-02 DIAGNOSIS — R73.03 PREDIABETES: ICD-10-CM

## 2025-04-02 DIAGNOSIS — Z13.1 SCREENING FOR DIABETES MELLITUS: ICD-10-CM

## 2025-04-02 DIAGNOSIS — I10 ESSENTIAL HYPERTENSION: ICD-10-CM

## 2025-04-02 DIAGNOSIS — Z78.9 WEIGHT LOSS ADVISED: ICD-10-CM

## 2025-04-02 DIAGNOSIS — F41.1 GENERALIZED ANXIETY DISORDER: ICD-10-CM

## 2025-04-02 DIAGNOSIS — F39 UNSPECIFIED MOOD (AFFECTIVE) DISORDER (HCC): ICD-10-CM

## 2025-04-02 DIAGNOSIS — E78.1 HYPERTRIGLYCERIDEMIA: ICD-10-CM

## 2025-04-02 DIAGNOSIS — E66.01 MORBIDLY OBESE (HCC): ICD-10-CM

## 2025-04-02 LAB
ALBUMIN SERPL BCG-MCNC: 4.4 G/DL (ref 3.5–5)
ALP SERPL-CCNC: 41 U/L (ref 34–104)
ALT SERPL W P-5'-P-CCNC: 42 U/L (ref 7–52)
ANION GAP SERPL CALCULATED.3IONS-SCNC: 8 MMOL/L (ref 4–13)
AST SERPL W P-5'-P-CCNC: 28 U/L (ref 5–45)
BASOPHILS # BLD AUTO: 0.03 THOUSANDS/ÂΜL (ref 0–0.1)
BASOPHILS NFR BLD AUTO: 0 % (ref 0–1)
BILIRUB SERPL-MCNC: 0.45 MG/DL (ref 0.2–1)
BUN SERPL-MCNC: 15 MG/DL (ref 5–25)
CALCIUM SERPL-MCNC: 8.8 MG/DL (ref 8.4–10.2)
CHLORIDE SERPL-SCNC: 102 MMOL/L (ref 96–108)
CHOLEST SERPL-MCNC: 133 MG/DL (ref ?–200)
CO2 SERPL-SCNC: 28 MMOL/L (ref 21–32)
CREAT SERPL-MCNC: 0.77 MG/DL (ref 0.6–1.3)
EOSINOPHIL # BLD AUTO: 0.23 THOUSAND/ÂΜL (ref 0–0.61)
EOSINOPHIL NFR BLD AUTO: 3 % (ref 0–6)
ERYTHROCYTE [DISTWIDTH] IN BLOOD BY AUTOMATED COUNT: 13.8 % (ref 11.6–15.1)
EST. AVERAGE GLUCOSE BLD GHB EST-MCNC: 137 MG/DL
GLUCOSE P FAST SERPL-MCNC: 136 MG/DL (ref 65–99)
HBA1C MFR BLD: 6.4 %
HCT VFR BLD AUTO: 35.9 % (ref 36.5–46.1)
HDLC SERPL-MCNC: 34 MG/DL
HGB BLD-MCNC: 11.5 G/DL (ref 12–15.4)
IMM GRANULOCYTES # BLD AUTO: 0.01 THOUSAND/UL (ref 0–0.2)
IMM GRANULOCYTES NFR BLD AUTO: 0 % (ref 0–2)
LDLC SERPL CALC-MCNC: 29 MG/DL (ref 0–100)
LYMPHOCYTES # BLD AUTO: 1.86 THOUSANDS/ÂΜL (ref 0.6–4.47)
LYMPHOCYTES NFR BLD AUTO: 28 % (ref 14–44)
MCH RBC QN AUTO: 27.8 PG (ref 26.8–34.3)
MCHC RBC AUTO-ENTMCNC: 32 G/DL (ref 31.4–37.4)
MCV RBC AUTO: 87 FL (ref 82–98)
MONOCYTES # BLD AUTO: 0.34 THOUSAND/ÂΜL (ref 0.17–1.22)
MONOCYTES NFR BLD AUTO: 5 % (ref 4–12)
NEUTROPHILS # BLD AUTO: 4.29 THOUSANDS/ÂΜL (ref 1.85–7.62)
NEUTS SEG NFR BLD AUTO: 64 % (ref 43–75)
NONHDLC SERPL-MCNC: 99 MG/DL
NRBC BLD AUTO-RTO: 0 /100 WBCS
PLATELET # BLD AUTO: 364 THOUSANDS/UL (ref 149–390)
PMV BLD AUTO: 10.4 FL (ref 8.9–12.7)
POTASSIUM SERPL-SCNC: 4.3 MMOL/L (ref 3.5–5.3)
PROT SERPL-MCNC: 7.4 G/DL (ref 6.4–8.4)
RBC # BLD AUTO: 4.13 MILLION/UL (ref 3.88–5.12)
SODIUM SERPL-SCNC: 138 MMOL/L (ref 135–147)
TRIGL SERPL-MCNC: 351 MG/DL (ref ?–150)
WBC # BLD AUTO: 6.76 THOUSAND/UL (ref 4.31–10.16)

## 2025-04-02 PROCEDURE — 36415 COLL VENOUS BLD VENIPUNCTURE: CPT

## 2025-04-02 PROCEDURE — 80061 LIPID PANEL: CPT

## 2025-04-02 PROCEDURE — 83036 HEMOGLOBIN GLYCOSYLATED A1C: CPT

## 2025-04-02 PROCEDURE — 85025 COMPLETE CBC W/AUTO DIFF WBC: CPT

## 2025-04-02 PROCEDURE — 99214 OFFICE O/P EST MOD 30 MIN: CPT | Performed by: FAMILY MEDICINE

## 2025-04-02 PROCEDURE — 80053 COMPREHEN METABOLIC PANEL: CPT

## 2025-04-02 RX ORDER — VENLAFAXINE HYDROCHLORIDE 150 MG/1
150 CAPSULE, EXTENDED RELEASE ORAL DAILY
Qty: 100 CAPSULE | Refills: 1 | Status: SHIPPED | OUTPATIENT
Start: 2025-04-02

## 2025-04-02 RX ORDER — CETIRIZINE HYDROCHLORIDE 10 MG/1
10 TABLET ORAL DAILY
Qty: 90 TABLET | Refills: 1 | Status: SHIPPED | OUTPATIENT
Start: 2025-04-02

## 2025-04-02 RX ORDER — ROSUVASTATIN CALCIUM 5 MG/1
5 TABLET, COATED ORAL DAILY
Qty: 90 TABLET | Refills: 1 | Status: SHIPPED | OUTPATIENT
Start: 2025-04-02

## 2025-04-02 RX ORDER — QUETIAPINE FUMARATE 50 MG/1
50 TABLET, FILM COATED ORAL
Qty: 100 TABLET | Refills: 1 | Status: SHIPPED | OUTPATIENT
Start: 2025-04-02

## 2025-04-02 RX ORDER — OMEPRAZOLE 20 MG/1
20 CAPSULE, DELAYED RELEASE ORAL DAILY
Qty: 90 CAPSULE | Refills: 1 | Status: SHIPPED | OUTPATIENT
Start: 2025-04-02

## 2025-04-02 RX ORDER — LOSARTAN POTASSIUM 100 MG/1
100 TABLET ORAL DAILY
Qty: 90 TABLET | Refills: 1 | Status: SHIPPED | OUTPATIENT
Start: 2025-04-02

## 2025-04-02 NOTE — ASSESSMENT & PLAN NOTE
Patient interested in seeing weight management.  Patient is aware that her weight needs to decrease.  Now that she is taking care of a small child, she is interested in looking into other ways to improve her health in general.  She requests a referral to weight management.  Orders:    Ambulatory Referral to Weight Management; Future

## 2025-04-02 NOTE — ASSESSMENT & PLAN NOTE
History of hypertriglyceridemia.  Patient had previously elevated cholesterol values.  And is actively being treated with rosuvastatin 5 mg daily.  Prior labs were normal, will repeat labs to determine next best steps.  Orders:    rosuvastatin (CRESTOR) 5 mg tablet; Take 1 tablet (5 mg total) by mouth daily

## 2025-04-02 NOTE — ASSESSMENT & PLAN NOTE
Stable.  Currently on omeprazole 20 mg daily.  Consider follow-up labs with vitamin D, magnesium, calcium, iron, and B12 due to chronic use of PPI.  Orders:    omeprazole (PriLOSEC) 20 mg delayed release capsule; Take 1 capsule (20 mg total) by mouth in the morning

## 2025-04-02 NOTE — PROGRESS NOTES
Name: Stella Sorenson      : 1990      MRN: [id number]  Encounter Provider: Semaj Najera DO  Encounter Date: 2025   Encounter department: [facility]  :  Assessment & Plan  Unspecified mood (affective) disorder (HCC)  Overall, patient's mood has been well-controlled on current dosing of Seroquel and Effexor.  Currently she is on a 150 mg and 37.5 mg Effexor dose.  Additionally Seroquel 50 mg at night for bed.  Currently stable.  Will continue with current dosing of Effexor and Seroquel.  Orders:    QUEtiapine (SEROquel) 50 mg tablet; Take 1 tablet (50 mg total) by mouth daily at bedtime    venlafaxine (EFFEXOR-XR) 150 mg 24 hr capsule; Take 1 capsule (150 mg total) by mouth daily    Hypertriglyceridemia  History of hypertriglyceridemia.  Patient had previously elevated cholesterol values.  And is actively being treated with rosuvastatin 5 mg daily.  Prior labs were normal, will repeat labs to determine next best steps.  Orders:    rosuvastatin (CRESTOR) 5 mg tablet; Take 1 tablet (5 mg total) by mouth daily    Mixed hyperlipidemia  See hypertriglyceridemia  Orders:    rosuvastatin (CRESTOR) 5 mg tablet; Take 1 tablet (5 mg total) by mouth daily    Generalized anxiety disorder  See mood disorder  Orders:    venlafaxine (EFFEXOR-XR) 150 mg 24 hr capsule; Take 1 capsule (150 mg total) by mouth daily    Gastro-esophageal reflux disease without esophagitis  Stable.  Currently on omeprazole 20 mg daily.  Consider follow-up labs with vitamin D, magnesium, calcium, iron, and B12 due to chronic use of PPI.  Orders:    omeprazole (PriLOSEC) 20 mg delayed release capsule; Take 1 capsule (20 mg total) by mouth in the morning    Essential hypertension  History of hypertension.  On presentation 130/80.  This is improved compared to prior values.  Currently on losartan 100 mg daily.  Orders:    losartan (COZAAR) 100 MG tablet; Take 1 tablet (100 mg total) by mouth daily    Seasonal  allergies  Patient has history of seasonal allergies.  She is to continue taking Zyrtec 10 mg daily  Orders:    cetirizine (ZyrTEC) 10 mg tablet; Take 1 tablet (10 mg total) by mouth daily    Morbidly obese (HCC)  Patient interested in seeing weight management.  Patient is aware that her weight needs to decrease.  Now that she is taking care of a small child, she is interested in looking into other ways to improve her health in general.  She requests a referral to weight management.  Orders:    Ambulatory Referral to Weight Management; Future    Weight loss advised  See morbid obesity  Orders:    Ambulatory Referral to Weight Management; Future           History of Present Illness   Patient presents today to follow-up for chronic medical issues.  The patient has a significant past medical history of bipolar, ADHD, depression, hypertension, hyperglycemia, mixed hyperlipidemia, ALEJANDRA, and morbid obesity.  The patient over has been doing well on current medication management.  She is taking losartan 100 mg for blood pressure.  She is on omeprazole 20 mg daily for reflux/GERD symptoms.  For cholesterol she is on rosuvastatin 5 mg, we will need to obtain the labs that she got this morning to determine if this dosage is appropriate.  And lastly with her bipolar/ADHD/depression, she is currently on Seroquel 50 mg nightly, Effexor 150 mg and 37.5 mg together.    There is still actively going through the process of obtaining full-time without visitation from the birth parent.  They are actively revoking the visitation and rights of parents currently.  Hopefully they will be full-time parents without any visitation soon.  Overall she feels that her mental health is stable and there has been no side effects associated with medication.  She denies any fever, chills, nausea, vomiting, lightheadedness, dizziness, chest pain, shortness of breath.    She is interested in having a referral to bariatrics to discuss options for weight  "management.  She understands that her BMI is elevated and because she now has a child that she is taking care of, she is interested in improving her health as much as she can to be around for as long as she can.      Review of Systems  See HPI  Objective   /80   Pulse 87   Ht 5' 3\" (1.6 m)   Wt 105 kg (232 lb)   SpO2 99%   BMI 41.10 kg/m²      Physical Exam  Constitutional:       General: Stella is not in acute distress.     Appearance: Normal appearance. Stella is obese. Stella is not ill-appearing, toxic-appearing or diaphoretic.   HENT:      Head: Normocephalic and atraumatic.      Right Ear: Tympanic membrane, ear canal and external ear normal. There is no impacted cerumen.      Left Ear: Tympanic membrane, ear canal and external ear normal. There is no impacted cerumen.      Ears:      Comments: Mild amount of cerumen in right ear.  Mild eczematous changes to natural orifice/ mikhail     Mouth/Throat:      Mouth: Mucous membranes are moist.      Pharynx: Oropharynx is clear. No oropharyngeal exudate or posterior oropharyngeal erythema.   Eyes:      General:         Right eye: No discharge.         Left eye: No discharge.      Pupils: Pupils are equal, round, and reactive to light.   Cardiovascular:      Rate and Rhythm: Normal rate and regular rhythm.      Heart sounds: Normal heart sounds. No murmur heard.     No friction rub. No gallop.   Pulmonary:      Effort: Pulmonary effort is normal. No respiratory distress.      Breath sounds: Normal breath sounds. No stridor. No wheezing, rhonchi or rales.   Abdominal:      General: Bowel sounds are normal. There is no distension.      Palpations: Abdomen is soft.      Tenderness: There is no abdominal tenderness.   Musculoskeletal:      Cervical back: Neck supple. No tenderness.   Lymphadenopathy:      Cervical: No cervical adenopathy.   Skin:     Capillary Refill: Capillary refill takes less than 2 seconds.   Neurological:      Mental Status: Stella is alert. "

## 2025-04-02 NOTE — ASSESSMENT & PLAN NOTE
See hypertriglyceridemia  Orders:    rosuvastatin (CRESTOR) 5 mg tablet; Take 1 tablet (5 mg total) by mouth daily

## 2025-04-02 NOTE — ASSESSMENT & PLAN NOTE
History of hypertension.  On presentation 130/80.  This is improved compared to prior values.  Currently on losartan 100 mg daily.  Orders:    losartan (COZAAR) 100 MG tablet; Take 1 tablet (100 mg total) by mouth daily

## 2025-04-02 NOTE — ASSESSMENT & PLAN NOTE
See mood disorder  Orders:    venlafaxine (EFFEXOR-XR) 150 mg 24 hr capsule; Take 1 capsule (150 mg total) by mouth daily

## 2025-04-13 ENCOUNTER — RESULTS FOLLOW-UP (OUTPATIENT)
Dept: FAMILY MEDICINE CLINIC | Facility: CLINIC | Age: 35
End: 2025-04-13

## 2025-04-15 NOTE — TELEPHONE ENCOUNTER
Stella returned call.  I don't see anything until May for f/u bloodwork.  All representatives busy at time of call. Please call patient back.  She stated mornings are better for her.    Thank you

## 2025-04-16 ENCOUNTER — TELEMEDICINE (OUTPATIENT)
Dept: FAMILY MEDICINE CLINIC | Facility: CLINIC | Age: 35
End: 2025-04-16

## 2025-04-16 VITALS — HEIGHT: 63 IN | BODY MASS INDEX: 41.11 KG/M2 | WEIGHT: 232 LBS

## 2025-04-16 DIAGNOSIS — D64.9 LOW HEMOGLOBIN: ICD-10-CM

## 2025-04-16 DIAGNOSIS — E78.1 HYPERTRIGLYCERIDEMIA: Primary | ICD-10-CM

## 2025-04-16 DIAGNOSIS — E78.2 MIXED HYPERLIPIDEMIA: ICD-10-CM

## 2025-04-16 DIAGNOSIS — R73.03 PREDIABETES: ICD-10-CM

## 2025-04-16 NOTE — ASSESSMENT & PLAN NOTE
Continue with current Crestor 5 mg daily.  She has had a significant improvement in her cholesterol levels, but triglycerides remain elevated.  I held off on increasing the Crestor due to the low LDL that is currently present, she has a value at 29.  If unable to give fenofibrate, may consider follow-up with Crestor.  Orders:    Lipid panel; Future

## 2025-04-16 NOTE — PROGRESS NOTES
Virtual Regular VisitName: Stella Sorenson      : 1990      MRN: [id number]  Encounter Provider: Semaj Najera DO  Encounter Date: 2025   Encounter department: [facility] [place]  :  Assessment & Plan  Hypertriglyceridemia  Patient presents today with increased hypertriglyceridemia.  Her triglycerides are at 350.  Discussed starting Tricor, I would likely start 54 mg with an increase to 108 mg over the course of 2 weeks.  I will need to touch base with the pharmacist to determine if there is truly a pineapple allergy with this medication.  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  Orders:    Lipid panel; Future    Mixed hyperlipidemia  Continue with current Crestor 5 mg daily.  She has had a significant improvement in her cholesterol levels, but triglycerides remain elevated.  I held off on increasing the Crestor due to the low LDL that is currently present, she has a value at 29.  If unable to give fenofibrate, may consider follow-up with Crestor.  Orders:    Lipid panel; Future    Prediabetes  Patient is borderline diabetic at 6.4.  She has been told about her prediabetes in the past and has not made any significant changes to her diet or exercise.  She will be following up with weight management in several days, hopefully this will help to reduce the amount of carbohydrates she is consuming and it is possible that they may start medication management for weight loss.  Orders:    Hemoglobin A1C; Future    Low hemoglobin  Patient has a low hemoglobin/hematocrit.  Unknown cause for chronic low values.  It could be that each was around the time of her period/right after her menses.  It also could be leaking in stool as well as urine.  Will obtain a UA on follow-up.  Considered stool samples, but if there is no concern for blood in the urine, may recommend follow-up colonoscopy to patient for further and more accurate  "review.  Orders:    UA w Reflex to Microscopic w Reflex to Culture; Future    CBC and differential; Future    Iron Panel (Includes Ferritin, Iron Sat%, Iron, and TIBC); Future        History of Present Illness     Patient presents today to review labs.  Overall she has normal electrolytes, kidney function, liver function, total cholesterol, LDL, white blood cell count, platelets.  She does have abnormal A1c at 6.4.  Her previous was at 6.1.  She has been slowly increasing her A1c over the course of the last several years.  Unfortunately the patient has not been cutting back on any of the carbs or sugary foods that she is consuming.  She will be following up with weight management in several days and they will likely discuss this as well.    Her triglycerides are also elevated to 351.  The rest of her cholesterol panel is overall within normal limits outside of the HDL which is slightly decreased at 34.  We discussed different options for treatment including increasing the Crestor, but also reviewed the possibility of starting Tricor.  When attempting to place the order, the patient has an allergy to pineapple which has some weird association with Tricor    Lastly the patient had decreased hemoglobin/hematocrit.  My concern is that this goes back several years.  She is usually around 11-11.5.  When discussing her most recent labs she was bleeding for around 1 month and she had just stopped having her period at the time of labs.      Review of Systems    Objective   Ht 5' 3\" (1.6 m)   Wt 105 kg (232 lb)   BMI 41.10 kg/m²     Physical Exam  It was my intent to perform this visit via video technology but the patient was not able to do a video connection so the visit was completed via audio telephone only.        Administrative Statements   Encounter provider Semaj Najera, DO    The Patient is located at [place] and in the following state in which I hold an active license [place].    The patient was identified by name " and date of birth. Stella Sorenson was informed that this is a telemedicine visit and that the visit is being conducted through the Epic Embedded platform. Stella agrees to proceed..  My office door was closed. No one else was in the room.  Stella acknowledged consent and understanding of privacy and security of the video platform. The patient has agreed to participate and understands they can discontinue the visit at any time.    I have spent a total time of 10 minutes in caring for this patient on the day of the visit/encounter including Diagnostic results, Risks and benefits of tx options, Instructions for management, and Impressions, not including the time spent for establishing the audio/video connection.

## 2025-04-16 NOTE — ASSESSMENT & PLAN NOTE
Patient presents today with increased hypertriglyceridemia.  Her triglycerides are at 350.  Discussed starting Tricor, I would likely start 54 mg with an increase to 108 mg over the course of 2 weeks.  I will need to touch base with the pharmacist to determine if there is truly a pineapple allergy with this medication.  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  Orders:    Lipid panel; Future

## 2025-04-18 ENCOUNTER — OFFICE VISIT (OUTPATIENT)
Dept: BARIATRICS | Facility: CLINIC | Age: 35
End: 2025-04-18

## 2025-04-18 VITALS
OXYGEN SATURATION: 99 % | DIASTOLIC BLOOD PRESSURE: 90 MMHG | BODY MASS INDEX: 41.11 KG/M2 | SYSTOLIC BLOOD PRESSURE: 142 MMHG | RESPIRATION RATE: 18 BRPM | HEART RATE: 108 BPM | HEIGHT: 63 IN | WEIGHT: 232 LBS

## 2025-04-18 DIAGNOSIS — E78.2 MIXED HYPERLIPIDEMIA: ICD-10-CM

## 2025-04-18 DIAGNOSIS — E66.01 MORBIDLY OBESE (HCC): ICD-10-CM

## 2025-04-18 DIAGNOSIS — R73.9 HYPERGLYCEMIA: ICD-10-CM

## 2025-04-18 DIAGNOSIS — I10 ESSENTIAL HYPERTENSION: ICD-10-CM

## 2025-04-18 DIAGNOSIS — Z78.9 WEIGHT LOSS ADVISED: ICD-10-CM

## 2025-04-18 DIAGNOSIS — E78.1 HYPERTRIGLYCERIDEMIA: ICD-10-CM

## 2025-04-18 DIAGNOSIS — G47.33 OSA (OBSTRUCTIVE SLEEP APNEA): ICD-10-CM

## 2025-04-18 DIAGNOSIS — E66.813 OBESITY, CLASS III, BMI 40-49.9 (MORBID OBESITY): Primary | ICD-10-CM

## 2025-04-18 RX ORDER — TIRZEPATIDE 2.5 MG/.5ML
2.5 INJECTION, SOLUTION SUBCUTANEOUS WEEKLY
Qty: 2 ML | Refills: 0 | Status: SHIPPED | OUTPATIENT
Start: 2025-04-18 | End: 2025-05-16

## 2025-04-18 NOTE — PATIENT INSTRUCTIONS
- Discussed options of HealthyCORE-Intensive Lifestyle Intervention Program, Very Low Calorie Diet-VLCD, and Conservative Program and the role of weight loss medications.  - Patient is interested in pursuing Conservative Program and follow up visits with medical weight management provider.  - Explained the importance of making lifestyle changes in addition to starting any anti-obesity medications.   - Initial weight loss goal of 5-10% weight loss for improved health. Weight loss can improve patient's co-morbid conditions and/or prevent weight-related complications.  - Weight is not at goal and patient has been unable to achieve a meaningful weight loss above 5% using various programs and tools for more than 6 months  - Labs reviewed.     General Recommendations:  Nutrition:  Eat breakfast daily.  Do not skip meals.      Food log (ie.) www.[employer].com, sparkpeople.com, loseit.com, calorieking.com, etc.     Practice mindful eating.  Be sure to set aside time to eat, eat slowly, and savor your food.     Hydration:    At least 64oz of water daily.  No sugar sweetened beverages.  No juice (eat the fruit instead).     Exercise:  Studies have shown that the ideal exercise goal is somewhere between 150 to 300 minutes of moderate intensity exercise a week.  Start with exercising 10 minutes every other day and gradually increase physical activity with a goal of at least 150 minutes of moderate intensity exercise a week, divided over at least 3 days a week.  An example of this would be exercising 30 minutes a day, 5 days a week.  Resistance training can increase muscle mass and increase our resting metabolic rate.   FULL BODY resistance training is recommended 2-3 times a week.  Do not do this on consecutive days to allow for muscle recovery.     Aim for a bare minimum 5000 steps, even on days you do not exercise.     Monitoring:   Weigh yourself daily.  If this causes undue stress, then just weigh yourself once a week.   Weigh yourself the same time of the day with the same amount of clothing on.  Preferably this should be done after waking up, before you eat, and with no clothing or minimal clothing on.     Specific Goals:  Calorie goal:  1500 geovani/day (Provided with meal plan to follow)

## 2025-04-18 NOTE — ASSESSMENT & PLAN NOTE
- Discussed options of HealthyCORE-Intensive Lifestyle Intervention Program, Very Low Calorie Diet-VLCD, and Conservative Program and the role of weight loss medications.  - Patient is interested in pursuing Conservative Program and follow up visits with medical weight management provider.  - Explained the importance of making lifestyle changes in addition to starting any anti-obesity medications.   - Initial weight loss goal of 5-10% weight loss for improved health. Weight loss can improve patient's co-morbid conditions and/or prevent weight-related complications.  - Weight is not at goal and patient has been unable to achieve a meaningful weight loss above 5% using various programs and tools for more than 6 months  - Labs reviewed.     General Recommendations:  Nutrition:  Eat breakfast daily.  Do not skip meals.      Food log (ie.) www.[employer].com, sparkpeople.com, loseit.com, calorieking.com, etc.     Practice mindful eating.  Be sure to set aside time to eat, eat slowly, and savor your food.     Hydration:    At least 64oz of water daily.  No sugar sweetened beverages.  No juice (eat the fruit instead).     Exercise:  Studies have shown that the ideal exercise goal is somewhere between 150 to 300 minutes of moderate intensity exercise a week.  Start with exercising 10 minutes every other day and gradually increase physical activity with a goal of at least 150 minutes of moderate intensity exercise a week, divided over at least 3 days a week.  An example of this would be exercising 30 minutes a day, 5 days a week.  Resistance training can increase muscle mass and increase our resting metabolic rate.   FULL BODY resistance training is recommended 2-3 times a week.  Do not do this on consecutive days to allow for muscle recovery.     Aim for a bare minimum 5000 steps, even on days you do not exercise.     Monitoring:   Weigh yourself daily.  If this causes undue stress, then just weigh yourself once a week.   Weigh yourself the same time of the day with the same amount of clothing on.  Preferably this should be done after waking up, before you eat, and with no clothing or minimal clothing on.     Specific Goals:  Calorie goal:  1500 geovani/day (Provided with meal plan to follow)

## 2025-04-18 NOTE — PROGRESS NOTES
Assessment/Plan:    Obesity, Class III, BMI 40-49.9 (morbid obesity)  - Discussed options of HealthyCORE-Intensive Lifestyle Intervention Program, Very Low Calorie Diet-VLCD, and Conservative Program and the role of weight loss medications.  - Patient is interested in pursuing Conservative Program and follow up visits with medical weight management provider.  - Explained the importance of making lifestyle changes in addition to starting any anti-obesity medications.   - Initial weight loss goal of 5-10% weight loss for improved health. Weight loss can improve patient's co-morbid conditions and/or prevent weight-related complications.  - Weight is not at goal and patient has been unable to achieve a meaningful weight loss above 5% using various programs and tools for more than 6 months  - Labs reviewed.     General Recommendations:  Nutrition:  Eat breakfast daily.  Do not skip meals.      Food log (ie.) www.PageScience.com, sparkpeople.com, loseit.com, calorieking.com, etc.     Practice mindful eating.  Be sure to set aside time to eat, eat slowly, and savor your food.     Hydration:    At least 64oz of water daily.  No sugar sweetened beverages.  No juice (eat the fruit instead).     Exercise:  Studies have shown that the ideal exercise goal is somewhere between 150 to 300 minutes of moderate intensity exercise a week.  Start with exercising 10 minutes every other day and gradually increase physical activity with a goal of at least 150 minutes of moderate intensity exercise a week, divided over at least 3 days a week.  An example of this would be exercising 30 minutes a day, 5 days a week.  Resistance training can increase muscle mass and increase our resting metabolic rate.   FULL BODY resistance training is recommended 2-3 times a week.  Do not do this on consecutive days to allow for muscle recovery.     Aim for a bare minimum 5000 steps, even on days you do not exercise.     Monitoring:   Weigh yourself daily.   If this causes undue stress, then just weigh yourself once a week.  Weigh yourself the same time of the day with the same amount of clothing on.  Preferably this should be done after waking up, before you eat, and with no clothing or minimal clothing on.     Specific Goals:  Calorie goal:  1500 geovnai/day (Provided with meal plan to follow)         Stella was seen today for consult.    Diagnoses and all orders for this visit:    Obesity, Class III, BMI 40-49.9 (morbid obesity)  -     tirzepatide (Zepbound) 2.5 mg/0.5 mL auto-injector; Inject 0.5 mL (2.5 mg total) under the skin once a week for 28 days    Essential hypertension  -     tirzepatide (Zepbound) 2.5 mg/0.5 mL auto-injector; Inject 0.5 mL (2.5 mg total) under the skin once a week for 28 days    ALEJANDRA (obstructive sleep apnea)  -     tirzepatide (Zepbound) 2.5 mg/0.5 mL auto-injector; Inject 0.5 mL (2.5 mg total) under the skin once a week for 28 days    Mixed hyperlipidemia  -     tirzepatide (Zepbound) 2.5 mg/0.5 mL auto-injector; Inject 0.5 mL (2.5 mg total) under the skin once a week for 28 days    Hyperglycemia  -     tirzepatide (Zepbound) 2.5 mg/0.5 mL auto-injector; Inject 0.5 mL (2.5 mg total) under the skin once a week for 28 days    Hypertriglyceridemia  -     tirzepatide (Zepbound) 2.5 mg/0.5 mL auto-injector; Inject 0.5 mL (2.5 mg total) under the skin once a week for 28 days    Morbidly obese (HCC)  -     Ambulatory Referral to Weight Management  -     tirzepatide (Zepbound) 2.5 mg/0.5 mL auto-injector; Inject 0.5 mL (2.5 mg total) under the skin once a week for 28 days    Weight loss advised  -     Ambulatory Referral to Weight Management  -     tirzepatide (Zepbound) 2.5 mg/0.5 mL auto-injector; Inject 0.5 mL (2.5 mg total) under the skin once a week for 28 days        Total time spent reviewing chart, interviewing patient, examining patient, discussing plan, answering all questions, and documentin min, with >50% face-to-face time spent  "counseling patient on nonsurgical interventions for the treatment of excess weight. Discussed in detail nonsurgical options including intensive lifestyle intervention program, very low-calorie diet program and conservative program.  Discussed the role of weight loss medications.  Counseled patient on diet behavior and exercise modification for weight loss.    Follow up in approximately 3 months with Non-Surgical Physician/Advanced Practitioner.    Subjective:   Chief Complaint   Patient presents with    Consult     +ALEJANDRA  WAIST 48\"       Patient ID: Stella Sorenson  is a 34 y.o. adult with excess weight/obesity here to pursue weight management.  Previous notes and records have been reviewed.    Past Medical History:   Diagnosis Date    Abnormal Pap smear of cervix     all until my last one in 2014    Anxiety     Asthma     Cancer (HCC) 08/2024    Melanoma    Cervical cyst     Current moderate episode of major depressive disorder without prior episode (HCC) 10/27/2017    Depression     Fatty liver     Fibroid     Genital warts 2011    Hypertension     Hypertriglyceridemia     Miscarriage     10    Mixed hyperlipidemia     Ovarian cyst     Ovarian cyst     left    Sleep apnea     uses CPAP     Past Surgical History:   Procedure Laterality Date    CO ESOPHAGOGASTRODUODENOSCOPY TRANSORAL DIAGNOSTIC N/A 4/16/2019    Procedure: ESOPHAGOGASTRODUODENOSCOPY (EGD);  Surgeon: Blake Irby MD;  Location: [facility];  Service: Gastroenterology    CO EXC B9 LESION MRGN XCP SK TG T/A/L 0.6-1.0 CM Right 8/22/2024    Procedure: EXCISION MASS - RIGHT HAND;  Surgeon: Tej Garcia MD;  Location: [facility];  Service: Orthopedics    CO RAD RESCJ AMANDEEP SOFT TISSUE HAND/FINGER 3 CM/> Right 10/2/2024    Procedure: Excision of mass of right hand;;  Surgeon: Jnoo Smiley DO;  Location: [facility];  Service: Orthopedics    TOOTH EXTRACTION      WISDOM TOOTH EXTRACTION         HPI:  Wt Readings from Last 20 Encounters: "   04/18/25 105 kg (232 lb)   04/16/25 105 kg (232 lb)   04/02/25 105 kg (232 lb)   10/22/24 104 kg (230 lb)   10/10/24 103 kg (228 lb)   10/02/24 102 kg (225 lb 12 oz)   10/01/24 104 kg (229 lb)   09/12/24 104 kg (229 lb)   09/04/24 104 kg (229 lb)   09/03/24 103 kg (227 lb)   08/29/24 103 kg (227 lb)   07/25/24 103 kg (227 lb)   06/28/24 101 kg (223 lb)   05/21/24 101 kg (223 lb)   03/08/24 99.8 kg (220 lb)   02/29/24 96.6 kg (213 lb)   02/22/24 97.5 kg (215 lb)   02/01/24 99.2 kg (218 lb 9.6 oz)   02/01/24 98.9 kg (218 lb)   01/29/24 99.8 kg (220 lb)       Patient presents today to medical weight management office for consult.      Starting MWM weight: 232 lbs (4/18/2025)  Starting BMI: 41.10 (4/18/2025)  Goal weight: 180 LBS       Obesity/Excess Weight:  Severity: Severe  Onset:  past few years     Modifiers: Diet and Exercise  Contributing factors: Poor Food Choices, Insufficient Physical Activity, Stress/Emotional Eating, and Insufficient time to make appropriate lifestyle changes  Associated symptoms: comorbid conditions, fatigue, increased joint pain, inability to do certain activities, and clothes do not fit    Patient denies personal history of pancreatitis. Patient also denies personal and family history of medullary thyroid cancer and multiple endocrine neoplasia type 2 (MEN 2 tumor). Increased risk for the aforementioned disease processes as well as those included in the medication monograph, associated with taking GLP-1 medication discussed. Patient acknowledges and would like to proceed with treatment. Patient also denies pregnancy. Expresses understanding that medication is not safe in pregnancy and will discontinue in the event of pregnancy. Patient demonstrates full understanding verbally. All questions answered.       Patient understands that it can take between 7-21 BUSINESS DAYS for a prior authorization to be initiated by the office and an additional 7-21 BUSINESS DAYS for the insurance company  to provide a decision. If the medication is denied by an insurance plan exclusion, the office WILL NOT appeal the medication.        Weight History  Highest adult weight:: (Patient-Rptd) (P) 230 lbs  Lowest adult weight:: (Patient-Rptd) (P) 180 lbs  What is your goal weight?: (Patient-Rptd) (P) 159 lbs  How long have you struggled with maintaining a healthy weight?: (Patient-Rptd) (P) Adolescence  What weight loss attempts have you had in the past?: (Patient-Rptd) (P) Diet, Exercise, Commercial Programs (Weight Watchers, Deepika Edward, Etc), Over the counter supplements  Which commercial programs have you tried?: (Patient-Rptd) (P) Weight watchers, nutrisystem  Which over the counter supplements have you tried?: (Patient-Rptd) (P) Hydroxycut, green tea extract, L carnitine    Food Behaviors  Do you have any of the following food related behaviors?: (Patient-Rptd) (P) Emotional Eating, Boredom Eating, Cravings  Is there a trouble area of the day when these behaviors are more prominent?: (Patient-Rptd) (P) Yes  When:: (Patient-Rptd) (P) Evenings    Food History  Provide the time that you typically eat and common foods you eat for each meal/snack: : (Patient-Rptd) (P) Breakfast, Lunch, Dinner, Snack(s)  Provide the time and common foods you eat for breakfast:: (Patient-Rptd) (P) 7:20am two donuts and a large energy drink for meli  Provide the time and common foods you eat for lunch:: (Patient-Rptd) (P) 11am-1pm grilled chicken paninni, cereal, hot pockets  Provide the time and common foods you eat for : (Patient-Rptd) (P) 5-7 fish, beef, chicken, rice, baked potatoes  Provide the time(s) and common foods you eat for a snack:: (Patient-Rptd) (P) varies cookies, chips, fruit, snack bars  How often do you go out to eat or have take out?: (Patient-Rptd) (P) 1-3 times a week  Daily beverage intake, please select the beverages you consume daily and the amount(s):: (Patient-Rptd) (P) Water, Soda, Tea  How many cups of water?:  (Patient-Rptd) (P) 2  How many cups of soda?: (Patient-Rptd) (P) 5  How many cups of tea?: (Patient-Rptd) (P) 2  Do you consume alcohol?: (Patient-Rptd) (P) No    Physical Activity   How often do you exercise?: (Patient-Rptd) (P) Once a week  How long are your activity sessions?: (Patient-Rptd) (P) 1/2 hour to 1 hour  What types of physical activity are you currently participating in?: (Patient-Rptd) (P) Cardio (elliptical, walking, running, biking, rowing, etc)    Sleep  How many hours of sleep are you getting per night?: (Patient-Rptd) (P) 5  How would you rate your quality of sleep?: (Patient-Rptd) (P) Fair  Do you have a history of sleep apnea?: (Patient-Rptd) (P) Yes  Is this being treated?: (Patient-Rptd) (P) Yes    Family/Social History  Do you have a family history of obesity?: (Patient-Rptd) (P) Yes  Who in your family?: (Patient-Rptd) (P) Grandma, sister, mom, aunt    Gynecological History  If of childbearing age, are you using birth control?: (Patient-Rptd) (P) No  Menopausal status?: (Patient-Rptd) (P) Premenopause    Patient denies personal history of pancreatitis. Patient also denies personal and family history of medullary thyroid cancer and multiple endocrine neoplasia type 2 (MEN 2 tumor). Increased risk for the aforementioned disease processes as well as those included in the medication monograph, associated with taking GLP-1 medication discussed. Patient acknowledges and would like to proceed with treatment. Patient also denies pregnancy. Expresses understanding that medication is not safe in pregnancy and will discontinue in the event of pregnancy. Patient demonstrates full understanding verbally. All questions answered.       Patient understands that it can take between 7-21 BUSINESS DAYS for a prior authorization to be initiated by the office and an additional 7-21 BUSINESS DAYS for the insurance company to provide a decision. If the medication is denied by an insurance plan exclusion, the  "office WILL NOT appeal the medication.        The following portions of the patient's history were reviewed and updated as appropriate: allergies, current medications, past family history, past medical history, past social history, past surgical history, and problem list.    Family History   Problem Relation Age of Onset    Hypertension Mother     Arthritis Father     Bipolar disorder Father     Heart disease Father         cardiac disorder    Hypertension Father     Alcohol abuse Father         He has recovered and no longer drinks.    ADD / ADHD Father     Hypertension Sister     No Known Problems Sister     Heart attack Paternal Grandfather          from it.    Arthritis Family     Hypertension Family     Hypertension Family     Hypertension Sister     ADD / ADHD Sister     Bipolar disorder Sister     Miscarriages / Stillbirths Sister     Miscarriages / Stillbirths Sister     Colon cancer Neg Hx     Ovarian cancer Neg Hx     Breast cancer Neg Hx     Uterine cancer Neg Hx         Review of Systems   Constitutional:  Negative for chills and fever.   HENT:  Negative for ear pain and sore throat.    Eyes:  Negative for pain and visual disturbance.   Respiratory:  Negative for cough and shortness of breath.    Cardiovascular:  Negative for chest pain and palpitations.   Gastrointestinal:  Negative for abdominal pain and vomiting.   Genitourinary:  Negative for dysuria and hematuria.   Musculoskeletal:  Negative for arthralgias and back pain.   Skin:  Negative for color change and rash.   Neurological:  Negative for seizures and syncope.   All other systems reviewed and are negative.      Objective:  /90 (BP Location: Left arm, Patient Position: Sitting, Cuff Size: Large)   Pulse (!) 108   Resp 18   Ht 5' 3\" (1.6 m)   Wt 105 kg (232 lb)   LMP 2025 (Exact Date)   SpO2 99%   BMI 41.10 kg/m²     Physical Exam  Constitutional:       General: Stella is not in acute distress.     Appearance: Normal " appearance. Stella is obese. Stella is not ill-appearing, toxic-appearing or diaphoretic.   HENT:      Head: Normocephalic and atraumatic.   Pulmonary:      Effort: Pulmonary effort is normal.   Musculoskeletal:         General: Normal range of motion.      Cervical back: Normal range of motion.   Skin:     General: Skin is warm.   Neurological:      Mental Status: Stella is alert and oriented to person, place, and time.   Psychiatric:         Mood and Affect: Mood normal.         Behavior: Behavior normal.              Labs and Imaging  Recent labs and imaging have been personally reviewed.  Lab Results   Component Value Date    WBC 6.76 04/02/2025    HGB 11.5 (L) 04/02/2025    HCT 35.9 (L) 04/02/2025    MCV 87 04/02/2025     04/02/2025     Lab Results   Component Value Date    SODIUM 138 04/02/2025    K 4.3 04/02/2025     04/02/2025    CO2 28 04/02/2025    AGAP 8 04/02/2025    BUN 15 04/02/2025    CREATININE 0.77 04/02/2025    GLUC 129 11/15/2024    GLUF 136 (H) 04/02/2025    CALCIUM 8.8 04/02/2025    AST 28 04/02/2025    ALT 42 04/02/2025    ALKPHOS 41 04/02/2025    TP 7.4 04/02/2025    TBILI 0.45 04/02/2025    EGFR 100 01/18/2022     Lab Results   Component Value Date    HGBA1C 6.4 (H) 04/02/2025     Lab Results   Component Value Date    YAC3RDIWNEPM 2.644 03/07/2024     Lab Results   Component Value Date    CHOLESTEROL 133 04/02/2025     Lab Results   Component Value Date    HDL 34 (L) 04/02/2025     Lab Results   Component Value Date    TRIG 351 (H) 04/02/2025     Lab Results   Component Value Date    LDLCALC 29 04/02/2025           Weight Management  Ruy Cespedes PA-C

## 2025-05-14 ENCOUNTER — OFFICE VISIT (OUTPATIENT)
Age: 35
End: 2025-05-14
Payer: COMMERCIAL

## 2025-05-14 VITALS
DIASTOLIC BLOOD PRESSURE: 88 MMHG | TEMPERATURE: 97.5 F | RESPIRATION RATE: 18 BRPM | SYSTOLIC BLOOD PRESSURE: 126 MMHG | OXYGEN SATURATION: 99 % | WEIGHT: 230 LBS | BODY MASS INDEX: 40.74 KG/M2 | HEART RATE: 105 BPM

## 2025-05-14 DIAGNOSIS — R00.0 TACHYCARDIA: ICD-10-CM

## 2025-05-14 DIAGNOSIS — J06.9 VIRAL URI WITH COUGH: ICD-10-CM

## 2025-05-14 DIAGNOSIS — H61.21 HEARING LOSS DUE TO CERUMEN IMPACTION, RIGHT: Primary | ICD-10-CM

## 2025-05-14 DIAGNOSIS — H61.22 EXCESSIVE CERUMEN IN EAR CANAL, LEFT: ICD-10-CM

## 2025-05-14 PROBLEM — Z30.9 CONTRACEPTION MANAGEMENT: Status: RESOLVED | Noted: 2023-11-03 | Resolved: 2025-05-14

## 2025-05-14 PROBLEM — E78.1 HYPERTRIGLYCERIDEMIA: Status: RESOLVED | Noted: 2023-11-02 | Resolved: 2025-05-14

## 2025-05-14 PROBLEM — L40.9 PSORIASIS: Status: ACTIVE | Noted: 2025-05-14

## 2025-05-14 PROBLEM — E78.5 HYPERLIPIDEMIA: Status: RESOLVED | Noted: 2017-12-13 | Resolved: 2025-05-14

## 2025-05-14 PROBLEM — E66.01 MORBIDLY OBESE (HCC): Status: RESOLVED | Noted: 2021-11-03 | Resolved: 2025-05-14

## 2025-05-14 PROBLEM — D21.9 GRANULAR CELL TUMOR: Status: RESOLVED | Noted: 2018-11-05 | Resolved: 2025-05-14

## 2025-05-14 PROCEDURE — 69210 REMOVE IMPACTED EAR WAX UNI: CPT | Performed by: PHYSICIAN ASSISTANT

## 2025-05-14 PROCEDURE — G0382 LEV 3 HOSP TYPE B ED VISIT: HCPCS | Performed by: PHYSICIAN ASSISTANT

## 2025-05-14 PROCEDURE — S9083 URGENT CARE CENTER GLOBAL: HCPCS | Performed by: PHYSICIAN ASSISTANT

## 2025-05-14 RX ORDER — BENZONATATE 200 MG/1
200 CAPSULE ORAL 3 TIMES DAILY PRN
Qty: 20 CAPSULE | Refills: 0 | Status: SHIPPED | OUTPATIENT
Start: 2025-05-14

## 2025-05-14 NOTE — PATIENT INSTRUCTIONS
"You likely have a virus such as the flu, covid or a cold. Please schedule a follow-up with your PCP within the next 2 days for recheck. Go to the ER for new worsening or concerning symptoms including but not limited to, dehydration, shortness of breath or chest pain    You can have fever for 3-5 days with a viral illness. Need re-evaluation for fevers >5 days. Additional symptoms that develop (congestion, cough, nausea, diarrhea) can last 7-10 days.      Stay out of work/school until fever free for 24 hours without use of tylenol or motrin     Make sure you have a thermometer and if you feel chills or sweats check it and write it down.  Take Tylenol (acetaminophen) and Motrin (ibuprofen) for fevers, body aches, and headaches. Alternate Motrin and Tylenol every 3 hours for more consistent relief.     Drink plenty of fluids to stay well hydrated- at least 2 L per day for adults  Water, hot water with lemon or honey, warm tea.   Can drink Pedialyte, Gatorade/Powerade zero, but should mix with water.      For diarrhea, vomiting and abdominal pain   Milk or juice can make diarrhea worse.  follow \"BRAT\" diet Bananas, Rice, Applesauce, Toast (also plain pasta or crackers)  Small frequent meals   Allow diarrhea to run its course. Most cases will resolved in 3-5 days     Use over-the-counter saline nasal spray/allergy medication for congestion, runny nose, and postnasal drip  Mucinex (guaifenesin) helps to thin and loosen mucous.   Claritin, Zyrtec, Xyzal, or Allegra are non-drowsy antihistamines- helps dry out mucous (will make mucous darker)  Delsym or robitussin (dextromethorphan) is a cough suppressant.  Oral decongestants- pseudoephedrine behind the counter pill helps with nasal and sinus congestion  Nasal Decongestants- phenylephrine or fluticasone nasal spray (oxymetazoline up to 3 days)  Vicks to the front/back of the chest bottom of the feet with socks     For sore throat relief  Warm salt water gargles, warm tea " with honey as needed  Cool mist humidifier at bedside- helps keep airway moist  Chloraseptic spray or throat lozenges with benzocaine (cepacol max strength).

## 2025-05-14 NOTE — PROGRESS NOTES
[facility]  Name: Stella Sorenson      : 1990      MRN: [id number]  Encounter Provider: Shannon D Severino, PA-C  Encounter Date: 2025   Encounter department: [facility]      :  Assessment & Plan  Hearing loss due to cerumen impaction, right         Excessive cerumen in ear canal, left         Tachycardia               Recent CXR:No results found for this visit on 25 (from the past 1000 hours).  Patient Instructions   Home care discussed. ER precautions given. All questions answered prior to discharge.    Chief Complaint     Chief Complaint   Patient presents with    Earache     Pt states they have right sided ear pain/fullness since last night     The following portions of the patient's history were reviewed and updated as appropriate: allergies, current medications, past family history, past medical history, past social history, past surgical history and problem list.     Medications have been verified.  History of Present Illness       Pt accompanied by  here for evaluation of R ear pain which started last night with clogging and popping sensations. Has had cold sx x 1.5 weeks (cough, congestion, PND). Took sinus pain (PE) meds, coricidin without relief. No fevers.    Earache   Associated symptoms include coughing. Pertinent negatives include no rash.       Review of Systems   Constitutional:  Negative for fever.   HENT:  Positive for congestion, ear pain and postnasal drip. Negative for nosebleeds.    Eyes:  Negative for redness.   Respiratory:  Positive for cough. Negative for shortness of breath.    Cardiovascular:  Negative for chest pain.   Gastrointestinal:  Negative for blood in stool.   Genitourinary:  Negative for hematuria.   Musculoskeletal:  Negative for gait problem.   Skin:  Negative for rash.   Neurological:  Negative for seizures.   Psychiatric/Behavioral:  Negative for behavioral problems.            Objective   /88   Pulse 105    Temp 97.5 °F (36.4 °C)   Resp 18   Wt 104 kg (230 lb)   LMP 04/01/2025 (Exact Date)   SpO2 99%   BMI 40.74 kg/m²       Physical Exam  Constitutional:       General: Stella is not in acute distress.     Appearance: Normal appearance. Stella is obese.   HENT:      Head: Normocephalic and atraumatic.      Right Ear: External ear normal. No middle ear effusion. There is impacted cerumen. Tympanic membrane is not erythematous.      Left Ear: Tympanic membrane and external ear normal.  No middle ear effusion. There is no impacted cerumen (large hard wax). Tympanic membrane is not erythematous.      Nose: Congestion present.      Right Sinus: No maxillary sinus tenderness or frontal sinus tenderness.      Left Sinus: No maxillary sinus tenderness or frontal sinus tenderness.      Mouth/Throat:      Lips: Pink.      Mouth: Mucous membranes are moist.      Pharynx: Oropharynx is clear. Uvula midline. No posterior oropharyngeal erythema or uvula swelling.      Tonsils: No tonsillar abscesses.     Eyes:      General: Lids are normal.      Extraocular Movements: Extraocular movements intact.      Conjunctiva/sclera: Conjunctivae normal.      Comments: glasses     Cardiovascular:      Rate and Rhythm: Regular rhythm. Tachycardia present.   Pulmonary:      Effort: Pulmonary effort is normal. No tachypnea or respiratory distress.      Breath sounds: Normal breath sounds.     Musculoskeletal:         General: Normal range of motion.      Cervical back: Normal range of motion.   Lymphadenopathy:      Cervical: No cervical adenopathy.     Skin:     General: Skin is warm and dry.      Findings: No rash.     Neurological:      General: No focal deficit present.      Mental Status: Stella is alert.      Cranial Nerves: No dysarthria or facial asymmetry.     Psychiatric:         Attention and Perception: Attention normal.         Mood and Affect: Mood normal.         Speech: Speech normal.         Behavior: Behavior normal. Behavior is  cooperative.           Ear cerumen removal    Date/Time: 5/14/2025 11:54 AM    Performed by: Shannon D Severino, PA-C  Authorized by: Shannon D Severino, PA-C    Universal Protocol:  procedure performed by consultantConsent: Verbal consent obtained  Risks and benefits: risks, benefits and alternatives were discussed  Consent given by: patient  Patient identity confirmed: verbally with patient    Patient location:  Bedside  Procedure details:     Local anesthetic:  None (had patient lay with warm water peroxide solution x 10 minutes to R ear)    Location:  Both ears    Procedure type: irrigation with instrumentation      Instrumentation: curette      Approach:  Natural orifice    Visualization (free text):  TM normal b/l  Post-procedure details:     Complication:  None    Hearing quality:  Normal    Patient tolerance of procedure:  Tolerated well, no immediate complications

## 2025-05-29 ENCOUNTER — TELEPHONE (OUTPATIENT)
Age: 35
End: 2025-05-29

## 2025-05-29 DIAGNOSIS — E78.1 HYPERTRIGLYCERIDEMIA: ICD-10-CM

## 2025-05-29 DIAGNOSIS — G47.33 OSA (OBSTRUCTIVE SLEEP APNEA): ICD-10-CM

## 2025-05-29 DIAGNOSIS — I10 ESSENTIAL HYPERTENSION: ICD-10-CM

## 2025-05-29 DIAGNOSIS — E78.2 MIXED HYPERLIPIDEMIA: ICD-10-CM

## 2025-05-29 DIAGNOSIS — E66.813 OBESITY, CLASS III, BMI 40-49.9 (MORBID OBESITY): Primary | ICD-10-CM

## 2025-05-29 RX ORDER — TIRZEPATIDE 2.5 MG/.5ML
2.5 INJECTION, SOLUTION SUBCUTANEOUS WEEKLY
Qty: 2 ML | Refills: 0 | Status: SHIPPED | OUTPATIENT
Start: 2025-05-29 | End: 2025-06-26

## 2025-05-29 NOTE — TELEPHONE ENCOUNTER
Pt said that she only has [employer] eff 02/01.  I told her that the [employer] is still showing effective.  She said to try and submit prior auth through [employer] to see if they deny it.  Maybe if they deny it, [facility] will then [facility].

## 2025-05-29 NOTE — TELEPHONE ENCOUNTER
Patient called because she was seen in April and prescribed medication that needed a prior authorization. Calling for update. Reached out to MA, who advised they will look into it.

## 2025-06-09 DIAGNOSIS — F41.1 GENERALIZED ANXIETY DISORDER: ICD-10-CM

## 2025-06-09 DIAGNOSIS — F39 UNSPECIFIED MOOD (AFFECTIVE) DISORDER (HCC): ICD-10-CM

## 2025-06-10 RX ORDER — VENLAFAXINE HYDROCHLORIDE 37.5 MG/1
37.5 CAPSULE, EXTENDED RELEASE ORAL DAILY
Qty: 100 CAPSULE | Refills: 0 | Status: SHIPPED | OUTPATIENT
Start: 2025-06-10

## 2025-06-10 RX ORDER — VENLAFAXINE HYDROCHLORIDE 150 MG/1
150 CAPSULE, EXTENDED RELEASE ORAL DAILY
Qty: 100 CAPSULE | Refills: 0 | Status: SHIPPED | OUTPATIENT
Start: 2025-06-10

## 2025-07-11 ENCOUNTER — OFFICE VISIT (OUTPATIENT)
Dept: FAMILY MEDICINE CLINIC | Facility: CLINIC | Age: 35
End: 2025-07-11

## 2025-07-11 VITALS
BODY MASS INDEX: 40.4 KG/M2 | SYSTOLIC BLOOD PRESSURE: 122 MMHG | RESPIRATION RATE: 16 BRPM | DIASTOLIC BLOOD PRESSURE: 80 MMHG | HEART RATE: 96 BPM | HEIGHT: 63 IN | WEIGHT: 228 LBS | OXYGEN SATURATION: 98 %

## 2025-07-11 DIAGNOSIS — M77.8 DELTOID TENDONITIS OF LEFT SHOULDER: Primary | ICD-10-CM

## 2025-07-11 DIAGNOSIS — M25.512 SEVERE PAIN OF LEFT SHOULDER: ICD-10-CM

## 2025-07-11 DIAGNOSIS — M25.512 CHRONIC LEFT SHOULDER PAIN: ICD-10-CM

## 2025-07-11 DIAGNOSIS — G89.29 CHRONIC LEFT SHOULDER PAIN: ICD-10-CM

## 2025-07-11 DIAGNOSIS — M25.612 DECREASED ROM OF LEFT SHOULDER: ICD-10-CM

## 2025-07-11 RX ORDER — KETOROLAC TROMETHAMINE 10 MG/1
10 TABLET, FILM COATED ORAL EVERY 8 HOURS PRN
Qty: 30 TABLET | Refills: 0 | Status: SHIPPED | OUTPATIENT
Start: 2025-07-11

## 2025-07-11 NOTE — PROGRESS NOTES
Name: Stella Sorenson      : 1990      MRN: [id number]  Encounter Provider: Semaj Najera DO  Encounter Date: 2025   Encounter department: [facility] [place]  :  Assessment & Plan  Deltoid tendonitis of left shoulder  Chronic left shoulder pain  Decreased ROM of left shoulder  Severe pain of left shoulder  Patient has significant pain and discomfort over the area of the deltoid/bicep region.  I feel that it is likely a deltoid tendinitis/strain due to the angle at which she starts to have pain and discomfort.  It is possible that there may be some significant changes to the rotator cuff as well, but at this point I feel that it is best if she is evaluated by orthopedics.  I will place her on oral anti-inflammatories, Toradol 10 mg every 8 hours for the next several days.  I am hopeful that she can get into see the orthopedic physicians sooner than later, if the anti-inflammatories are what she required and she has a significant improvement for any reason we can then follow-up with physical therapy.  My impression is that she will likely need to follow-up with the orthopedic physicians and have an MRI if her pain continues to be as severe and limiting as it is today.  Orders:    ketorolac (TORADOL) 10 mg tablet; Take 1 tablet (10 mg total) by mouth every 8 (eight) hours as needed for moderate pain or severe pain    Ambulatory Referral to Orthopedic Surgery; Future         History of Present Illness   SUBJECTIVE    Onset of left arm pain : 1.5 months  Intensity of pain:  worst- 8/10,  occasional throbbing 2/10 at baseline or at rest  Location of Pain:  deltoid/ left shoulder/ upper arm  Radiation:  down upper arm into forearm  Character of Pain:  jabbing, stabbing, pulling sensation  Constant intermittent: constant, worse with movement  Exacerbating factors:  laying down on it, movement, propping body with elbow  Relieving Factors: positioning at rest, lidocaine    Associated  "Symptoms:    -  Erythema, Numbness, tingling  +  Decreased ROM  +  Decreased Strength    -  Red Flag Symptoms-  Numbness and tingling to Genitals/perineum,   increased weakness of LE, Loss of bowel or bladder function.     Inciting Event/ Trauma:  not known  Progression:  progressively worse  Previous Episodes: No prior episodes 1.5 months  Specialist Follow up: None  Hx of Physical Therapy:  None  Prior medications:  lidocaine        Review of Systems    Objective   /80   Pulse 96   Resp 16   Ht 5' 3\" (1.6 m)   Wt 103 kg (228 lb)   SpO2 98%   BMI 40.39 kg/m²      Physical Exam  Constitutional:       Appearance: Normal appearance. Stella is not ill-appearing, toxic-appearing or diaphoretic.   HENT:      Head: Normocephalic and atraumatic.     Musculoskeletal:         General: Tenderness (palpation over area of deltoid insertion, possiblyarea of biceps origin.) present.        Arms:       Comments: Limited ROM for extension, flexion past 30 degrees, abduction past 30 degrees on left arm.  Right arm has normal ROM and muscle strength. Significant pain past these areas on left side.  Limited ROM on left inhibiting movement.     Skin:     General: Skin is warm.      Findings: No bruising, erythema or rash.     Neurological:      Mental Status: Stella is alert.      Sensory: No sensory deficit.      Motor: Weakness (with abduction and adduction.  Limited to light pressure with abduction.) present.         "

## 2025-08-22 ENCOUNTER — OFFICE VISIT (OUTPATIENT)
Dept: OBGYN CLINIC | Facility: CLINIC | Age: 35
End: 2025-08-22
Payer: COMMERCIAL

## 2025-08-22 VITALS — WEIGHT: 230 LBS | HEIGHT: 63 IN | BODY MASS INDEX: 40.75 KG/M2

## 2025-08-22 DIAGNOSIS — M75.82 TENDINITIS OF LEFT ROTATOR CUFF: Primary | ICD-10-CM

## 2025-08-22 DIAGNOSIS — M75.42 SUBACROMIAL IMPINGEMENT OF LEFT SHOULDER: ICD-10-CM

## 2025-08-22 DIAGNOSIS — S46.012A ROTATOR CUFF STRAIN, LEFT, INITIAL ENCOUNTER: ICD-10-CM

## 2025-08-22 PROCEDURE — 20610 DRAIN/INJ JOINT/BURSA W/O US: CPT | Performed by: FAMILY MEDICINE

## 2025-08-22 PROCEDURE — 99243 OFF/OP CNSLTJ NEW/EST LOW 30: CPT | Performed by: FAMILY MEDICINE

## 2025-08-22 RX ORDER — BUPIVACAINE HYDROCHLORIDE 2.5 MG/ML
1 INJECTION, SOLUTION INFILTRATION; PERINEURAL
Status: COMPLETED | OUTPATIENT
Start: 2025-08-22 | End: 2025-08-22

## 2025-08-22 RX ORDER — BUPIVACAINE HYDROCHLORIDE 2.5 MG/ML
4 INJECTION, SOLUTION INFILTRATION; PERINEURAL
Status: COMPLETED | OUTPATIENT
Start: 2025-08-22 | End: 2025-08-22

## 2025-08-22 RX ORDER — TRIAMCINOLONE ACETONIDE 40 MG/ML
80 INJECTION, SUSPENSION INTRA-ARTICULAR; INTRAMUSCULAR
Status: COMPLETED | OUTPATIENT
Start: 2025-08-22 | End: 2025-08-22

## 2025-08-22 RX ADMIN — BUPIVACAINE HYDROCHLORIDE 1 ML: 2.5 INJECTION, SOLUTION INFILTRATION; PERINEURAL at 15:15

## 2025-08-22 RX ADMIN — BUPIVACAINE HYDROCHLORIDE 4 ML: 2.5 INJECTION, SOLUTION INFILTRATION; PERINEURAL at 15:15

## 2025-08-22 RX ADMIN — TRIAMCINOLONE ACETONIDE 80 MG: 40 INJECTION, SUSPENSION INTRA-ARTICULAR; INTRAMUSCULAR at 15:15

## (undated) DEVICE — OCCLUSIVE GAUZE STRIP,3% BISMUTH TRIBROMOPHENATE IN PETROLATUM BLEND: Brand: XEROFORM

## (undated) DEVICE — NEEDLE 25G X 1 1/2

## (undated) DEVICE — GLOVE SRG BIOGEL 6.5

## (undated) DEVICE — INTENDED FOR TISSUE SEPARATION, AND OTHER PROCEDURES THAT REQUIRE A SHARP SURGICAL BLADE TO PUNCTURE OR CUT.: Brand: BARD-PARKER ® CARBON RIB-BACK BLADES

## (undated) DEVICE — GAUZE SPONGES,16 PLY: Brand: CURITY

## (undated) DEVICE — SUT MONOCRYL 3-0 PS-2 18 IN Y497G

## (undated) DEVICE — CUFF TOURNIQUET 18 X 4 IN QUICK CONNECT DISP 1 BLADDER

## (undated) DEVICE — GLOVE PI ULTRA TOUCH SZ.7.5

## (undated) DEVICE — SUT MONOCRYL 2-0 CT-1 36 IN Y945H

## (undated) DEVICE — LIGACLIP MCA MULTIPLE CLIP APPLIERS, 20 SMALL CLIPS: Brand: LIGACLIP

## (undated) DEVICE — GLOVE INDICATOR PI UNDERGLOVE SZ 7 BLUE

## (undated) DEVICE — SPONGE SCRUB 4 PCT CHLORHEXIDINE

## (undated) DEVICE — STERILE BETHLEHEM PLASTIC HAND: Brand: CARDINAL HEALTH

## (undated) DEVICE — EXOFIN PRECISION PEN HIGH VISCOSITY TOPICAL SKIN ADHESIVE: Brand: EXOFIN PRECISION PEN, 1G

## (undated) DEVICE — ACE WRAP 4 IN STERILE

## (undated) DEVICE — PAD CAST 4 IN COTTON NON STERILE

## (undated) DEVICE — SCD SEQUENTIAL COMPRESSION COMFORT SLEEVE MEDIUM KNEE LENGTH: Brand: KENDALL SCD

## (undated) DEVICE — 10FR FRAZIER SUCTION HANDLE: Brand: CARDINAL HEALTH

## (undated) DEVICE — NEEDLE HYPO 23G X 1-1/2 IN

## (undated) DEVICE — TELFA NON-ADHERENT ABSORBENT DRESSING: Brand: TELFA

## (undated) DEVICE — SYRINGE 20ML LL

## (undated) DEVICE — ACE WRAP 3 IN VELCRO LATEX FREE

## (undated) DEVICE — SUT MONOCRYL 3-0 PS-2 27 IN Y427H

## (undated) DEVICE — 3M™ STERI-DRAPE™ U-DRAPE 1015: Brand: STERI-DRAPE™

## (undated) DEVICE — GLOVE SRG BIOGEL ECLIPSE 7

## (undated) DEVICE — GLOVE PI ULTRA TOUCH SZ.8.0

## (undated) DEVICE — STRETCH BANDAGE: Brand: CURITY

## (undated) DEVICE — DRESSING MEPILEX AG BORDER POST-OP 4 X 6 IN

## (undated) DEVICE — SUT CHROMIC 4-0 P-3 18 MM 1654G

## (undated) DEVICE — GLOVE PI ULTRA TOUCH SZ.6.5

## (undated) DEVICE — SPECIMEN CONTAINER STERILE PEEL PACK